# Patient Record
Sex: FEMALE | Race: BLACK OR AFRICAN AMERICAN | NOT HISPANIC OR LATINO | Employment: OTHER | URBAN - METROPOLITAN AREA
[De-identification: names, ages, dates, MRNs, and addresses within clinical notes are randomized per-mention and may not be internally consistent; named-entity substitution may affect disease eponyms.]

---

## 2017-07-07 ENCOUNTER — HISTORICAL (OUTPATIENT)
Dept: ADMINISTRATIVE | Facility: HOSPITAL | Age: 77
End: 2017-07-07

## 2018-02-28 ENCOUNTER — HISTORICAL (OUTPATIENT)
Dept: ADMINISTRATIVE | Facility: HOSPITAL | Age: 78
End: 2018-02-28

## 2019-08-08 ENCOUNTER — HISTORICAL (OUTPATIENT)
Dept: RADIOLOGY | Facility: HOSPITAL | Age: 79
End: 2019-08-08

## 2020-08-03 ENCOUNTER — HISTORICAL (OUTPATIENT)
Dept: ADMINISTRATIVE | Facility: HOSPITAL | Age: 80
End: 2020-08-03

## 2021-06-20 ENCOUNTER — HISTORICAL (OUTPATIENT)
Dept: ADMINISTRATIVE | Facility: HOSPITAL | Age: 81
End: 2021-06-20

## 2021-08-05 ENCOUNTER — HISTORICAL (OUTPATIENT)
Dept: ADMINISTRATIVE | Facility: HOSPITAL | Age: 81
End: 2021-08-05

## 2021-09-06 ENCOUNTER — HISTORICAL (OUTPATIENT)
Dept: ADMINISTRATIVE | Facility: HOSPITAL | Age: 81
End: 2021-09-06

## 2021-09-06 LAB
ABS NEUT (OLG): 6.08 X10(3)/MCL (ref 2.1–9.2)
ALBUMIN SERPL-MCNC: 3.8 GM/DL (ref 3.4–4.8)
ALBUMIN/GLOB SERPL: 1.1 RATIO (ref 1.1–2)
ALP SERPL-CCNC: 56 UNIT/L (ref 40–150)
ALT SERPL-CCNC: 14 UNIT/L (ref 0–55)
AST SERPL-CCNC: 16 UNIT/L (ref 5–34)
BASOPHILS # BLD AUTO: 0 X10(3)/MCL (ref 0–0.2)
BASOPHILS NFR BLD AUTO: 0 %
BILIRUB SERPL-MCNC: 0.4 MG/DL
BILIRUBIN DIRECT+TOT PNL SERPL-MCNC: 0.2 MG/DL (ref 0–0.5)
BILIRUBIN DIRECT+TOT PNL SERPL-MCNC: 0.2 MG/DL (ref 0–0.8)
BUN SERPL-MCNC: 16.4 MG/DL (ref 9.8–20.1)
CALCIUM SERPL-MCNC: 9.6 MG/DL (ref 8.4–10.2)
CHLORIDE SERPL-SCNC: 105 MMOL/L (ref 98–107)
CO2 SERPL-SCNC: 29 MMOL/L (ref 23–31)
CREAT SERPL-MCNC: 0.99 MG/DL (ref 0.55–1.02)
EOSINOPHIL # BLD AUTO: 0.3 X10(3)/MCL (ref 0–0.9)
EOSINOPHIL NFR BLD AUTO: 2 %
ERYTHROCYTE [DISTWIDTH] IN BLOOD BY AUTOMATED COUNT: 13.2 % (ref 11.5–14.5)
EST CREAT CLEARANCE SER (OHS): 36.13 ML/MIN
GLOBULIN SER-MCNC: 3.6 GM/DL (ref 2.4–3.5)
GLUCOSE SERPL-MCNC: 146 MG/DL (ref 82–115)
HCT VFR BLD AUTO: 40.4 % (ref 35–46)
HGB BLD-MCNC: 13.1 GM/DL (ref 12–16)
IMM GRANULOCYTES # BLD AUTO: 0.02 10*3/UL
IMM GRANULOCYTES NFR BLD AUTO: 0 %
LYMPHOCYTES # BLD AUTO: 3.2 X10(3)/MCL (ref 0.6–4.6)
LYMPHOCYTES NFR BLD AUTO: 31 %
MCH RBC QN AUTO: 29.2 PG (ref 26–34)
MCHC RBC AUTO-ENTMCNC: 32.4 GM/DL (ref 31–37)
MCV RBC AUTO: 90.2 FL (ref 80–100)
MONOCYTES # BLD AUTO: 0.7 X10(3)/MCL (ref 0.1–1.3)
MONOCYTES NFR BLD AUTO: 6 %
NEUTROPHILS # BLD AUTO: 6.08 X10(3)/MCL (ref 2.1–9.2)
NEUTROPHILS NFR BLD AUTO: 59 %
NRBC BLD AUTO-RTO: 0 % (ref 0–0.2)
PLATELET # BLD AUTO: 223 X10(3)/MCL (ref 130–400)
PMV BLD AUTO: 10.8 FL (ref 7.4–10.4)
POTASSIUM SERPL-SCNC: 3.9 MMOL/L (ref 3.5–5.1)
PROT SERPL-MCNC: 7.4 GM/DL (ref 5.8–7.6)
RBC # BLD AUTO: 4.48 X10(6)/MCL (ref 4–5.2)
SODIUM SERPL-SCNC: 141 MMOL/L (ref 136–145)
WBC # SPEC AUTO: 10.2 X10(3)/MCL (ref 4.5–11)

## 2021-11-28 ENCOUNTER — HISTORICAL (OUTPATIENT)
Dept: ADMINISTRATIVE | Facility: HOSPITAL | Age: 81
End: 2021-11-28

## 2021-11-28 LAB — SARS-COV-2 RNA RESP QL NAA+PROBE: NEGATIVE

## 2022-04-07 ENCOUNTER — HISTORICAL (OUTPATIENT)
Dept: ADMINISTRATIVE | Facility: HOSPITAL | Age: 82
End: 2022-04-07

## 2022-04-23 VITALS
HEIGHT: 62 IN | SYSTOLIC BLOOD PRESSURE: 116 MMHG | WEIGHT: 187.81 LBS | OXYGEN SATURATION: 97 % | DIASTOLIC BLOOD PRESSURE: 62 MMHG | BODY MASS INDEX: 34.56 KG/M2

## 2022-04-30 NOTE — ED PROVIDER NOTES
Patient:   Darby Gary            MRN: [id number]            FIN: [id number]               Age:   81 years     Sex:  Female     :  1940   Associated Diagnoses:   Back pain with right-sided sciatica   Author:   Zane Zaidi      Basic Information   Time seen: Immediately upon arrival.   History source: Patient.   Arrival mode: Private vehicle.   History limitation: None.   Additional information: Chief Complaint from Nursing Triage Note : Chief Complaint   2021 8:51 CDT       Chief Complaint           pt. AAOx4. pt. presents to ED with c/o R. sided hip pain that radiates down into R. leg. reports recent heavy lifting. hx. arthritis. ambulates with steady gait  .      History of Present Illness   The patient presents with back pain and lumbar pain.  The onset was 2  days ago.  The course/duration of symptoms is constant.  Type of injury: lifting.  The location where the incident occurred was at home.  Location: Right lumbar. Radiating pain: right lower extremity. The character of symptoms is dull.  The degree at onset was moderate.  The degree at present is moderate.  There are exacerbating factors including movement and changing position.  The relieving factor is rest.  Risk factors consist of none.  Prior episodes: occasional.  Therapy today: none.  Associated symptoms: none, denies bowel dysfunction, denies bladder dysfunction, denies altered sensation, denies focal weakness, denies saddle numbness, denies abdominal pain and denies dysuria.        Review of Systems   Constitutional symptoms:  Negative except as documented in HPI, no fever, no chills.    Skin symptoms:  Negative except as documented in HPI.   Eye symptoms:  Negative except as documented in HPI.   ENMT symptoms:  Negative except as documented in HPI.   Respiratory symptoms:  Negative except as documented in HPI, no shortness of breath, no cough.    Cardiovascular symptoms:  Negative except as documented in HPI, No chest  pain,    Gastrointestinal symptoms:  Negative except as documented in HPI, no abdominal pain, no nausea, no vomiting.    Genitourinary symptoms:  Negative except as documented in HPI, no dysuria, no hematuria, no discharge.    Musculoskeletal symptoms:  Negative except as documented in HPI.   Neurologic symptoms:  Negative except as documented in HPI.   Psychiatric symptoms:  Negative except as documented in HPI.   Endocrine symptoms:  Negative except as documented in HPI.             Additional review of systems information: All other systems reviewed and otherwise negative.      Health Status   Allergies:    Allergic Reactions (All)  No Known Allergies,    Allergies (1) Active Reaction  No Known Allergies None Documented  .   Medications:  (Selected)   Inpatient Medications  Ordered  ketorolac for IM: 15 mg, form: Injection, IM, Once-NOW, first dose 06/20/21 9:08:00 CDT, stop date 06/20/21 9:08:00 CDT, STAT  Prescriptions  Prescribed  fluticasone 50 mcg/inh nasal spray: 2 spray(s), Nasal, Daily, # 1 EA, 1 Refill(s)  Documented Medications  Documented  Citracal + D: BID, 0 Refill(s)  aspirin 81 mg oral tablet: 81 mg = 1 tab(s), Oral, Daily, # 30 tab(s), 0 Refill(s)  hydrochlorothiazide-lisinopril 12.5 mg-10 mg oral tablet: 1 tab, Oral, Daily, 0 Refill(s)  potassium chloride 10 mEq oral TABLET extended release: 10 mEq = 1 tab(s), Oral, Daily, # 30 tab(s), 0 Refill(s)  simvastatin 40 mg oral tablet: 40 mg = 1 tab(s), Oral, qPM.      Past Medical/ Family/ Social History   Medical history:    Resolved  Pregnant ([id number]): Onset on 8/14/1968 at 28 years.  Resolved in 1969 at 28 years.  Pregnant ([id number]): Onset on 3/27/1967 at 26 years.  Resolved in 1968 at 27 years.  Pregnant ([id number]): Onset on 3/27/1966 at 25 years.  Resolved in 1967 at 26 years.  Pregnant ([id number]): Onset on 3/27/1964 at 23 years.  Resolved in 1965 at 24 years.  Pregnant ([id number]): Onset on 3/27/1963 at 22 years.  Resolved in 1964 at 23  years.  Pregnant ([id number]): Onset on 3/27/1959 at 18 years.  Resolved in  at 19 years.  HTN - Hypertension ([id number]):  Resolved., Reviewed as documented in chart.   Surgical history:    Hysteroscopic Dilation & Curettage (None) performed by Petra Lopez MD on 2020 at 80 Years.  Comments:  2020 12:04 CDT - Jameson GARNETT, Danya Zaidi  auto-populated from documented surgical case  Colonoscopy (SNOMED CT 367653873).   section (SNOMED CT [id number])..   Family history: Not significant,    Primary malignant neoplasm of breast  Aunt  .   Social history: Reviewed as documented in chart.   Problem list:    Active Problems (5)  DM II (diabetes mellitus, type II), controlled   Family history of colon cancer   Hyperlipidemia   Hypertension   Obesity   .      Physical Examination               Vital Signs   Vital Signs   2021 8:51 CDT       Temperature Temporal Artery               36.3 DegC                             Peripheral Pulse Rate     80 bpm                             Respiratory Rate          16 br/min                             SpO2                      97 %                             Oxygen Therapy            Room air                             Systolic Blood Pressure   169 mmHg  HI                             Diastolic Blood Pressure  92 mmHg  HI  .      Vital Signs (last 24 hrs)_____  Last Charted___________  Heart Rate Peripheral   80 bpm  ( 08:51)  Resp Rate         16 br/min  ( 08:51)  SBP      H 169mmHg  ( 08:51)  DBP      H 92mmHg  ( 08:51)  SpO2      97 %  (:)  Weight      84.30 kg  ( 08:51)  .   Measurements   2021 8:51 CDT       Weight Dosing             84.30 kg                             Weight Measured           84.30 kg                             Weight Measured and Calculated in Lbs     185.85 lb  .   General:  Alert, no acute distress.    Skin:  Warm, dry.    Head:  Normocephalic.   Neck:  Supple, trachea midline, no  tenderness.    Cardiovascular:  Regular rate and rhythm, No murmur, Normal peripheral perfusion, No edema.    Respiratory:  Lungs are clear to auscultation, respirations are non-labored, breath sounds are equal, Symmetrical chest wall expansion.    Gastrointestinal:  Soft, Nontender, Non distended, Normal bowel sounds, No organomegaly.    Back:  Nontender, No costovertebral angle tenderness, , Thoracic: no vertebral point tenderness, Lumbar: Right, lateral, mild, tenderness, midline negative, no vertebral point tenderness, Sacral: no vertebral point tenderness, Testing: Straight leg raising, supine negative.    Musculoskeletal:  Normal ROM, normal strength, no tenderness, no swelling, no deformity.    Neurological:  Alert and oriented to person, place, time, and situation, No focal neurological deficit observed, normal reflexes.    Psychiatric:  Cooperative, appropriate mood & affect.       Medical Decision Making   Documents reviewed:  Emergency department nurses' notes.   Orders  Launch Order Profile (Selected)   Inpatient Orders  Ordered  ketorolac for IM: 15 mg, form: Injection, IM, Once-NOW, first dose 06/20/21 9:08:00 CDT, stop date 06/20/21 9:08:00 CDT, STAT  Ordered (Exam Ordered)  XR Spine Lumbar 2 or 3 Views: Stat, 06/20/21 9:08:00 CDT, Back Pain, None, Wheelchair, Rad Type, Not Scheduled, 06/20/21 9:08:00 CDT.   Radiology results:  06/20/2021 10:22; Zane Zaidi; Negative; No fracture, dislocation or other acute abnormality.      Impression and Plan   Diagnosis   Back pain with right-sided sciatica (RLG50-YK M54.31)   Plan   Condition: Improved, Stable.    Disposition: Medically cleared, Discharged: Time  6/20/2021 10:22:00, to home.    Prescriptions: Launch prescriptions   Pharmacy:  meloxicam 7.5 mg oral tablet (Prescribe): 7.5 mg = 1 tab(s), Oral, Daily, PRN PRN pain, # 12 tab(s), 0 Refill(s), Pharmacy: [id number], 157.5, cm, Height/Length Dosing, 5/12/2021 21:23 CDT, 84.3, kg, Weight  Dosing, 6/20/2021 8:51 CDT.    Patient was given the following educational materials: Sciatica, Easy-to-Read.    Follow up with: Report to ED if symptoms return / worsen Follow-up with PCP in 3 to 5 days.    Counseled: Patient, Regarding diagnosis, Regarding diagnostic results, Regarding treatment plan, Regarding prescription, Patient indicated understanding of instructions.    Orders: Launch Orders   Admit/Transfer/Discharge:  Discharge (Order): 6/20/2021 10:23 CDT, Home.

## 2022-04-30 NOTE — ED PROVIDER NOTES
Patient:   Darby Gary            MRN: [id number]            FIN: [id number]               Age:   81 years     Sex:  Female     :  1940   Associated Diagnoses:   Acute constipation; History of lower GI bleeding   Author:   Marcel Stone MD      Basic Information   Time seen: Immediately upon arrival.   History source: Patient.   Arrival mode: Private vehicle.   History limitation: None.   Additional information: Chief Complaint from Nursing Triage Note : Chief Complaint   2021 16:59 CDT       Chief Complaint           pt arrived to ED c/o blood in stool starting today. denies N/V, diarrhea.  hx polyps  .   Provider/Visit info:   Time Seen:  Marcel Stone MD / 2021 17:05  .   History of Present Illness   The patient presents with rectal bleeding.  The onset was just prior to arrival.  The course/duration of symptoms is episodic: with a single episode.  Vomiting: none.  Rectal bleed: blood with stool and degree minimal.  The exacerbating factor is none.  The relieving factor is none.  Risk factors consist of diabetes mellitus.  Prior episodes: none.  Therapy today: none.  Associated symptoms: denies abdominal pain, denies vomiting, denies fever, denies diarrhea, denies rectal pain and denies dizziness.        Review of Systems   Constitutional symptoms:  No fever, no weakness, no fatigue.    Skin symptoms:  No rash, no petechiae.    Eye symptoms:  No recent vision problems,    Respiratory symptoms:  No shortness of breath, no orthopnea, no cough, no hemoptysis.    Cardiovascular symptoms:  No chest pain, no palpitations, no syncope, no diaphoresis, no peripheral edema.    Gastrointestinal symptoms:  Constipation, Took magnesium citrate, no abdominal pain, no nausea, no vomiting, no diarrhea.    Genitourinary symptoms:  No dysuria, no hematuria, no vaginal bleeding, no vaginal discharge.    Musculoskeletal symptoms:  No back pain, no Joint pain.    Neurologic  symptoms:  No headache, no dizziness, no altered level of consciousness, no numbness, no tingling, no weakness.    Hematologic/Lymphatic symptoms:  Negative except as documented in HPI.             Additional review of systems information: All other systems reviewed and otherwise negative.      Health Status   Allergies:    Allergic Reactions (Selected)  No Known Allergies,    Allergies (1) Active Reaction  No Known Allergies None Documented  .   Medications:  (Selected)   Prescriptions  Prescribed  meloxicam 7.5 mg oral tablet: 7.5 mg = 1 tab(s), Oral, Daily, PRN PRN pain, # 12 tab(s), 0 Refill(s), Pharmacy: [id number], 157.5, cm, Height/Length Dosing, 05/12/21 21:23:00 CDT, 84.3, kg, Weight Dosing, 06/20/21 8:51:00 CDT  prednisONE 10 mg oral tablet: 10 mg = 1 tab(s), Oral, Daily, start tomorrow 6/28, first dose given in ER, # 3 tab(s), 0 Refill(s), Pharmacy: [place] [facility] [address] [facility], 164, cm, Height/Length Dosing, 06/27/21 8:41:00 CDT, 86.6, kg, Weight Dosing, 06/27/21 8:41:00 CDT  Documented Medications  Documented  Citracal + D: BID, 0 Refill(s)  Norco 5 mg-325 mg oral tablet: 1 tab(s), Oral, q4hr, PRN PRN for pain, 0 Refill(s)  aspirin 81 mg oral tablet: 81 mg = 1 tab(s), Oral, Daily, # 30 tab(s), 0 Refill(s)  hydrochlorothiazide-lisinopril 12.5 mg-10 mg oral tablet: 1 tab, Oral, Daily, 0 Refill(s)  metformin 500 mg oral tablet: 500 mg = 1 tab(s), Oral, BID, # 180 tab(s), 0 Refill(s)  potassium chloride 10 mEq oral TABLET extended release: 10 mEq = 1 tab(s), Oral, Daily, # 30 tab(s), 0 Refill(s)  simvastatin 40 mg oral tablet: 40 mg = 1 tab(s), Oral, qPM  traMADol 50 mg oral tablet: 50 mg = 1 tab(s), Oral, BID.      Past Medical/ Family/ Social History   Medical history:    Resolved  Pregnant (191073013): Onset on 8/14/1968 at 28 years.  Resolved in 1969 at 28 years.  Pregnant ([id number]): Onset on 3/27/1967 at 26 years.  Resolved in 1968 at 27 years.  Pregnant ([id number]): Onset on 3/27/1966 at 25  years.  Resolved in  at 26 years.  Pregnant ([id number]): Onset on 3/27/1964 at 23 years.  Resolved in  at 24 years.  Pregnant ([id number]): Onset on 3/27/1963 at 22 years.  Resolved in  at 23 years.  Pregnant ([id number]): Onset on 3/27/1959 at 18 years.  Resolved in  at 19 years.  HTN - Hypertension ([id number]):  Resolved..   Surgical history:    Hysteroscopic Dilation & Curettage (None) on 2020 at 80 Years.  Comments:  2020 12:04 SENAIT Barba RN, Danya Zaidi  auto-populated from documented surgical case  Colonoscopy ([id number]).   section ([id number])..   Family history:    Primary malignant neoplasm of breast  Aunt  .   Social history:    Social & Psychosocial Habits    Alcohol  12/15/2012 Risk Assessment: Denies Alcohol Use    Sexual  2020  Sexually active: No    Substance Use  12/15/2012 Risk Assessment: Denies Substance Abuse    2020  Use: Never    Tobacco  12/15/2012 Risk Assessment: Denies Tobacco Use    2021  Use: Never (less than 100 in l    Patient Wants Consult For Cessation Counseling No    2021  Use: Never (less than 100 in l    Patient Wants Consult For Cessation Counseling N/A    2021  Use: Never (less than 100 in l    Patient Wants Consult For Cessation Counseling No    2021  Use: Never (less than 100 in l    Patient Wants Consult For Cessation Counseling No    Abuse/Neglect  2021  SHX Any signs of abuse or neglect No    2021  SHX Any signs of abuse or neglect No    2021  SHX Any signs of abuse or neglect No    Feels unsafe at home: No    Safe place to go: Yes    2021  SHX Any signs of abuse or neglect No    Feels unsafe at home: No    Safe place to go: Yes  , Alcohol use: Denies, Tobacco use: Denies, Drug use: Denies.   Problem list:    Active Problems (6)  Alteration in comfort   DM II (diabetes mellitus, type II), controlled   Family history of colon cancer   Hyperlipidemia   Hypertension   Obesity   .       Physical Examination               Vital Signs   Vital Signs   9/6/2021 16:59 CDT       Temperature Oral          36.9 DegC                             Temperature Oral (calculated)             98.42 DegF                             Peripheral Pulse Rate     79 bpm                             Respiratory Rate          17 br/min                             SpO2                      98 %                             Oxygen Therapy            Room air                             Systolic Blood Pressure   159 mmHg  HI                             Diastolic Blood Pressure  88 mmHg  .      Vital Signs (last 24 hrs)_____  Last Charted___________  Temp Oral     36.9 DegC  (SEP 06 16:59)  Heart Rate Peripheral   79 bpm  (SEP 06 16:59)  Resp Rate         17 br/min  (SEP 06 16:59)  SBP      H 159mmHg  (SEP 06 16:59)  DBP      88 mmHg  (SEP 06 16:59)  SpO2      98 %  (SEP 06 16:59)  Weight      89 kg  (SEP 06 16:59)  .   Measurements   9/6/2021 16:59 CDT       Weight Dosing             89 kg                             Weight Measured           89 kg                             Weight Measured and Calculated in Lbs     196.21 lb                             Height/Length Dosing      157 cm                             Height/Length Estimated   157 cm  .   Basic Oxygen Information   9/6/2021 16:59 CDT       SpO2                      98 %                             Oxygen Therapy            Room air  .   General:  Alert, no acute distress.    Skin:  Warm, dry, pink, intact, no pallor.    Head:  Normocephalic.   Neck:  Supple, no JVD.    Eye:  Pupils are equal, round and reactive to light, normal conjunctiva.    Ears, nose, mouth and throat:  Oral mucosa moist.   Cardiovascular:  Regular rate and rhythm, No murmur, Normal peripheral perfusion, No edema.    Respiratory:  Lungs are clear to auscultation, respirations are non-labored, breath sounds are equal, Symmetrical chest wall expansion.    Gastrointestinal:  Soft, Nontender, Non  distended, Normal bowel sounds, No organomegaly.    Back:  Nontender, Normal range of motion.    Musculoskeletal:  Normal ROM, normal strength, no swelling.    Neurological:  Alert and oriented to person, place, time, and situation, No focal neurological deficit observed, normal motor observed, normal speech observed, normal coordination observed.    Psychiatric:  Cooperative, appropriate mood & affect.       Medical Decision Making   Documents reviewed:  Emergency department nurses' notes, emergency department records, prior records.    Results review:  Reviewed Results: Lab results : Lab View(Date Range: 9/5/2021 0:00 CDT - 9/6/2021 19:22 CDT),    No qualifying data available, Interpretation Labs unremarkable, Normal results.    Abdominal/KUB X-ray  Nonspecific bowel gas pattern, interpretation by Emergency Physician, Moderate constipation.       Impression and Plan   Diagnosis   Acute constipation (QCS68-DP K59.00)   History of lower GI bleeding (WZX55-IV Z87.19)   Acute constipation (AKZ82-TW K59.00)   Plan   Condition: Stable.    Disposition: Discharged: Time  9/6/2021 19:23:00, to home.    Patient was given the following educational materials: Constipation, Adult, Easy-to-Read, Colorectal Cancer Screening, Preventing Colorectal Cancer, Rectal Bleeding, Rectal Bleeding, Preventing Colorectal Cancer, Colorectal Cancer Screening, Constipation, Adult, Easy-to-Read.    Follow up with: Report to ED if symptoms return / worsen; [facility] - GI Lab Within 1 month; Follow-up with PCP in 3 to 5 days.    Counseled: Patient, Regarding diagnosis, Regarding diagnostic results, Regarding treatment plan, Regarding prescription, Patient indicated understanding of instructions.

## 2022-06-01 DIAGNOSIS — Z01.419 GYNECOLOGIC EXAM NORMAL: Primary | ICD-10-CM

## 2022-10-26 ENCOUNTER — OFFICE VISIT (OUTPATIENT)
Dept: URGENT CARE | Facility: CLINIC | Age: 82
End: 2022-10-26
Payer: COMMERCIAL

## 2022-10-26 VITALS
HEIGHT: 62 IN | HEART RATE: 74 BPM | WEIGHT: 194.19 LBS | DIASTOLIC BLOOD PRESSURE: 79 MMHG | BODY MASS INDEX: 35.73 KG/M2 | OXYGEN SATURATION: 97 % | RESPIRATION RATE: 18 BRPM | SYSTOLIC BLOOD PRESSURE: 143 MMHG | TEMPERATURE: 98 F

## 2022-10-26 DIAGNOSIS — M54.41 RIGHT-SIDED LOW BACK PAIN WITH RIGHT-SIDED SCIATICA, UNSPECIFIED CHRONICITY: Primary | ICD-10-CM

## 2022-10-26 PROCEDURE — 99213 OFFICE O/P EST LOW 20 MIN: CPT | Mod: S$PBB,,,

## 2022-10-26 PROCEDURE — 99213 PR OFFICE/OUTPT VISIT, EST, LEVL III, 20-29 MIN: ICD-10-PCS | Mod: S$PBB,,,

## 2022-10-26 PROCEDURE — 99214 OFFICE O/P EST MOD 30 MIN: CPT | Mod: PBBFAC

## 2022-10-26 RX ORDER — SIMVASTATIN 40 MG/1
TABLET, FILM COATED ORAL
COMMUNITY
Start: 2022-08-15

## 2022-10-26 RX ORDER — TRAMADOL HYDROCHLORIDE 50 MG/1
50 TABLET ORAL 2 TIMES DAILY PRN
COMMUNITY
Start: 2022-09-21 | End: 2023-02-02

## 2022-10-26 RX ORDER — POTASSIUM CHLORIDE 750 MG/1
10 TABLET, EXTENDED RELEASE ORAL DAILY
COMMUNITY
Start: 2022-08-23 | End: 2024-01-16 | Stop reason: SDUPTHER

## 2022-10-26 RX ORDER — HYDROCODONE BITARTRATE AND ACETAMINOPHEN 5; 325 MG/1; MG/1
1 TABLET ORAL EVERY 6 HOURS PRN
Qty: 12 TABLET | Refills: 0 | Status: SHIPPED | OUTPATIENT
Start: 2022-10-26 | End: 2022-10-29

## 2022-10-26 RX ORDER — METFORMIN HYDROCHLORIDE 500 MG/1
500 TABLET ORAL DAILY
COMMUNITY
Start: 2022-09-21

## 2022-10-26 RX ORDER — LISINOPRIL AND HYDROCHLOROTHIAZIDE 10; 12.5 MG/1; MG/1
TABLET ORAL
COMMUNITY
Start: 2022-08-15

## 2022-10-26 NOTE — PROGRESS NOTES
"Subjective:       Patient ID: Darby Gary is a 82 y.o. female.    Vitals:  height is 5' 2.21" (1.58 m) and weight is 88.1 kg (194 lb 3.2 oz). Her oral temperature is 97.9 °F (36.6 °C). Her blood pressure is 143/79 (abnormal) and her pulse is 74. Her respiration is 18 and oxygen saturation is 97%.     Chief Complaint: Leg Pain (Right leg pain. X3 days. States may be Sciatica. Denies numbness. )    Pt states R sided pain that radiates from lower back down to knee for the past 2 days. States has had similar issues with the left side in the past. Denies any fall, injury, bowel or bladder changes.    Leg Pain       Constitution: Negative.   HENT: Negative.     Neck: neck negative.   Cardiovascular: Negative.    Eyes: Negative.    Respiratory: Negative.     Gastrointestinal: Negative.    Genitourinary: Negative.    Musculoskeletal:  Positive for pain.   Allergic/Immunologic: Negative.    Neurological: Negative.    Hematologic/Lymphatic: Negative.      Objective:      Physical Exam   Constitutional: She is oriented to person, place, and time.   HENT:   Head: Normocephalic.   Nose: Nose normal.   Mouth/Throat: Mucous membranes are moist. Oropharynx is clear.   Eyes: Pupils are equal, round, and reactive to light.   Cardiovascular: Normal rate and normal pulses.   Pulmonary/Chest: Effort normal.   Abdominal: Normal appearance. Soft.   Musculoskeletal: Normal range of motion.         General: Tenderness present. No signs of injury. Normal range of motion.        Arms:    Neurological: She is alert and oriented to person, place, and time.   Skin: Skin is warm and dry.       Assessment:       1. Right-sided low back pain with right-sided sciatica, unspecified chronicity          Plan:         Right-sided low back pain with right-sided sciatica, unspecified chronicity  -     HYDROcodone-acetaminophen (NORCO) 5-325 mg per tablet; Take 1 tablet by mouth every 6 (six) hours as needed for Pain.  Dispense: 12 tablet; Refill: 0     "   Pt states taking tramadol in the past without relief.  Pt given hydrocodone. Instructed patient of possible side effects.  Pt verbalized understanding.    Follow up with PCP as soon as possible for pain.

## 2022-12-02 ENCOUNTER — HOSPITAL ENCOUNTER (EMERGENCY)
Facility: HOSPITAL | Age: 82
Discharge: HOME | End: 2022-12-02
Attending: INTERNAL MEDICINE
Payer: COMMERCIAL

## 2022-12-02 VITALS
RESPIRATION RATE: 15 BRPM | BODY MASS INDEX: 34.96 KG/M2 | HEART RATE: 102 BPM | DIASTOLIC BLOOD PRESSURE: 81 MMHG | HEIGHT: 62 IN | OXYGEN SATURATION: 96 % | SYSTOLIC BLOOD PRESSURE: 137 MMHG | TEMPERATURE: 100 F | WEIGHT: 190 LBS

## 2022-12-02 DIAGNOSIS — N30.00 ACUTE CYSTITIS WITHOUT HEMATURIA: ICD-10-CM

## 2022-12-02 DIAGNOSIS — U07.1 COVID-19 VIRUS INFECTION: Primary | ICD-10-CM

## 2022-12-02 DIAGNOSIS — R00.0 TACHYCARDIA: ICD-10-CM

## 2022-12-02 LAB
ALBUMIN SERPL-MCNC: 4.1 GM/DL (ref 3.4–4.8)
ALBUMIN/GLOB SERPL: 1.1 RATIO (ref 1.1–2)
ALP SERPL-CCNC: 63 UNIT/L (ref 40–150)
ALT SERPL-CCNC: 12 UNIT/L (ref 0–55)
APPEARANCE UR: CLEAR
AST SERPL-CCNC: 16 UNIT/L (ref 5–34)
BACTERIA #/AREA URNS AUTO: ABNORMAL /HPF
BASOPHILS # BLD AUTO: 0.03 X10(3)/MCL (ref 0–0.2)
BASOPHILS NFR BLD AUTO: 0.3 %
BILIRUB UR QL STRIP.AUTO: NEGATIVE MG/DL
BILIRUBIN DIRECT+TOT PNL SERPL-MCNC: 0.4 MG/DL
BUN SERPL-MCNC: 17.2 MG/DL (ref 9.8–20.1)
CALCIUM SERPL-MCNC: 9.4 MG/DL (ref 8.4–10.2)
CASTS URNS MICRO: ABNORMAL /LPF
CHLORIDE SERPL-SCNC: 101 MMOL/L (ref 98–107)
CO2 SERPL-SCNC: 26 MMOL/L (ref 23–31)
COLOR UR AUTO: ABNORMAL
CREAT SERPL-MCNC: 0.96 MG/DL (ref 0.55–1.02)
EOSINOPHIL # BLD AUTO: 0.01 X10(3)/MCL (ref 0–0.9)
EOSINOPHIL NFR BLD AUTO: 0.1 %
ERYTHROCYTE [DISTWIDTH] IN BLOOD BY AUTOMATED COUNT: 12.8 % (ref 11.5–17)
FLUAV AG UPPER RESP QL IA.RAPID: NOT DETECTED
FLUBV AG UPPER RESP QL IA.RAPID: NOT DETECTED
GFR SERPLBLD CREATININE-BSD FMLA CKD-EPI: 60 MLS/MIN/1.73/M2
GLOBULIN SER-MCNC: 3.7 GM/DL (ref 2.4–3.5)
GLUCOSE SERPL-MCNC: 181 MG/DL (ref 82–115)
GLUCOSE UR QL STRIP.AUTO: NORMAL MG/DL
HCT VFR BLD AUTO: 42.5 % (ref 37–47)
HGB BLD-MCNC: 13.8 GM/DL (ref 12–16)
HYALINE CASTS #/AREA URNS LPF: ABNORMAL /LPF
IMM GRANULOCYTES # BLD AUTO: 0.05 X10(3)/MCL (ref 0–0.04)
IMM GRANULOCYTES NFR BLD AUTO: 0.4 %
KETONES UR QL STRIP.AUTO: NEGATIVE MG/DL
LEUKOCYTE ESTERASE UR QL STRIP.AUTO: 75 UNIT/L
LYMPHOCYTES # BLD AUTO: 1.01 X10(3)/MCL (ref 0.6–4.6)
LYMPHOCYTES NFR BLD AUTO: 8.4 %
MCH RBC QN AUTO: 28.8 PG (ref 27–31)
MCHC RBC AUTO-ENTMCNC: 32.5 MG/DL (ref 33–36)
MCV RBC AUTO: 88.7 FL (ref 80–94)
MONOCYTES # BLD AUTO: 0.54 X10(3)/MCL (ref 0.1–1.3)
MONOCYTES NFR BLD AUTO: 4.5 %
MUCOUS THREADS URNS QL MICRO: ABNORMAL /LPF
NEUTROPHILS # BLD AUTO: 10.3 X10(3)/MCL (ref 2.1–9.2)
NEUTROPHILS NFR BLD AUTO: 86.3 %
NITRITE UR QL STRIP.AUTO: NEGATIVE
NRBC BLD AUTO-RTO: 0 %
PH UR STRIP.AUTO: 8 [PH]
PLATELET # BLD AUTO: 229 X10(3)/MCL (ref 130–400)
PMV BLD AUTO: 9.6 FL (ref 7.4–10.4)
POTASSIUM SERPL-SCNC: 3.8 MMOL/L (ref 3.5–5.1)
PROT SERPL-MCNC: 7.8 GM/DL (ref 5.8–7.6)
PROT UR QL STRIP.AUTO: ABNORMAL MG/DL
RBC # BLD AUTO: 4.79 X10(6)/MCL (ref 4.2–5.4)
RBC #/AREA URNS AUTO: ABNORMAL /HPF
RBC UR QL AUTO: ABNORMAL UNIT/L
SARS-COV-2 RNA RESP QL NAA+PROBE: DETECTED
SODIUM SERPL-SCNC: 139 MMOL/L (ref 136–145)
SP GR UR STRIP.AUTO: 1.02
SQUAMOUS #/AREA URNS LPF: ABNORMAL /HPF
UROBILINOGEN UR STRIP-ACNC: NORMAL MG/DL
WBC # SPEC AUTO: 12 X10(3)/MCL (ref 4.5–11.5)
WBC #/AREA URNS AUTO: ABNORMAL /HPF

## 2022-12-02 PROCEDURE — 81001 URINALYSIS AUTO W/SCOPE: CPT | Performed by: INTERNAL MEDICINE

## 2022-12-02 PROCEDURE — 63600175 PHARM REV CODE 636 W HCPCS

## 2022-12-02 PROCEDURE — 80053 COMPREHEN METABOLIC PANEL: CPT | Performed by: INTERNAL MEDICINE

## 2022-12-02 PROCEDURE — 25000003 PHARM REV CODE 250: Performed by: INTERNAL MEDICINE

## 2022-12-02 PROCEDURE — 93005 ELECTROCARDIOGRAM TRACING: CPT

## 2022-12-02 PROCEDURE — 82962 GLUCOSE BLOOD TEST: CPT

## 2022-12-02 PROCEDURE — 99285 EMERGENCY DEPT VISIT HI MDM: CPT | Mod: 25

## 2022-12-02 PROCEDURE — 0240U COVID/FLU A&B PCR: CPT | Performed by: INTERNAL MEDICINE

## 2022-12-02 PROCEDURE — 85025 COMPLETE CBC W/AUTO DIFF WBC: CPT | Performed by: INTERNAL MEDICINE

## 2022-12-02 PROCEDURE — 96374 THER/PROPH/DIAG INJ IV PUSH: CPT

## 2022-12-02 RX ORDER — ONDANSETRON 2 MG/ML
INJECTION INTRAMUSCULAR; INTRAVENOUS
Status: COMPLETED
Start: 2022-12-02 | End: 2022-12-02

## 2022-12-02 RX ORDER — ONDANSETRON 2 MG/ML
4 INJECTION INTRAMUSCULAR; INTRAVENOUS
Status: COMPLETED | OUTPATIENT
Start: 2022-12-02 | End: 2022-12-02

## 2022-12-02 RX ORDER — ACETAMINOPHEN 500 MG
1000 TABLET ORAL
Status: COMPLETED | OUTPATIENT
Start: 2022-12-02 | End: 2022-12-02

## 2022-12-02 RX ORDER — AMOXICILLIN AND CLAVULANATE POTASSIUM 500; 125 MG/1; MG/1
1 TABLET, FILM COATED ORAL 2 TIMES DAILY
Qty: 10 TABLET | Refills: 0 | Status: SHIPPED | OUTPATIENT
Start: 2022-12-02 | End: 2022-12-07

## 2022-12-02 RX ADMIN — ONDANSETRON 4 MG: 2 INJECTION INTRAMUSCULAR; INTRAVENOUS at 08:12

## 2022-12-02 RX ADMIN — ACETAMINOPHEN 1000 MG: 500 TABLET ORAL at 08:12

## 2022-12-02 NOTE — Clinical Note
"Darby"Yarely Gary was seen and treated in our emergency department on 12/2/2022.  She may return to work on 12/10/2022.       If you have any questions or concerns, please don't hesitate to call.      Marcel Mccormack MD"

## 2022-12-03 LAB — POCT GLUCOSE: 172 MG/DL (ref 70–110)

## 2022-12-03 NOTE — ED PROVIDER NOTES
Encounter Date: 12/2/2022       History     Chief Complaint   Patient presents with    Altered Mental Status    Emesis     Pt and her daughter states that pt had multiple episodes of vomiting today while at work. Pt then became disorineted afterwards and was unaware of where she was or where she had left her vehicle or how she got home. Pt denies N/V at present. GCS 15 AAOx3 unsure what day of the week it is     Presents with her daughter after an episode of disorientation and vomiting. Pt states she went to work, was not feeling well and vomited for which a co worker brought her home. Family was concern when they arrived home because her car was not there and the door was open. When they woke her up she did not recall the event, just remember been sick at work and asking a coworker to brought her home. At this time pt states just feeling weak, no other complaints    The history is provided by the patient and a relative.   Review of patient's allergies indicates:  No Known Allergies  Past Medical History:   Diagnosis Date    Arthritis     Hyperlipidemia     Hypertension     Osteoporosis      No past surgical history on file.  No family history on file.  Social History     Tobacco Use    Smoking status: Never    Smokeless tobacco: Never   Substance Use Topics    Alcohol use: Never    Drug use: Never     Review of Systems   Constitutional:  Negative for fever.   HENT:  Negative for sore throat.    Respiratory:  Negative for shortness of breath.    Cardiovascular:  Negative for chest pain.   Gastrointestinal:  Positive for nausea and vomiting.   Genitourinary:  Negative for dysuria.   Musculoskeletal:  Negative for back pain.   Skin:  Negative for rash.   Neurological:  Positive for weakness and light-headedness.   Hematological:  Does not bruise/bleed easily.   All other systems reviewed and are negative.    Physical Exam     Initial Vitals [12/02/22 1938]   BP Pulse Resp Temp SpO2   (!) 165/80 (!) 115 20 100 °F (37.8  °C) 97 %      MAP       --         Physical Exam    Nursing note and vitals reviewed.  Constitutional: She appears well-developed.   HENT:   Head: Normocephalic and atraumatic.   Mouth/Throat: Oropharynx is clear and moist.   Eyes: Conjunctivae and EOM are normal. Pupils are equal, round, and reactive to light.   Neck: Neck supple.   Normal range of motion.  Cardiovascular:  Normal rate, regular rhythm, normal heart sounds and intact distal pulses.           Pulmonary/Chest: Breath sounds normal.   Abdominal: Abdomen is soft. Bowel sounds are normal. She exhibits no distension. There is no abdominal tenderness. There is no rebound and no guarding.   Musculoskeletal:         General: No edema. Normal range of motion.      Cervical back: Normal range of motion and neck supple.     Neurological: She is alert and oriented to person, place, and time. She has normal strength. GCS score is 15. GCS eye subscore is 4. GCS verbal subscore is 5. GCS motor subscore is 6.   Skin: Skin is warm and dry. No rash noted.   Psychiatric: Her behavior is normal.       ED Course   Procedures  Labs Reviewed   COMPREHENSIVE METABOLIC PANEL - Abnormal; Notable for the following components:       Result Value    Glucose Level 181 (*)     Protein Total 7.8 (*)     Globulin 3.7 (*)     All other components within normal limits   URINALYSIS, REFLEX TO URINE CULTURE - Abnormal; Notable for the following components:    Protein, UA Trace (*)     Blood, UA 1+ (*)     Leukocyte Esterase, UA 75 (*)     WBC, UA 6-10 (*)     Bacteria, UA Trace (*)     Squamous Epithelial Cells, UA Many (*)     Mucous, UA Trace (*)     Unclassified Cast, UA 0-2 (*)     RBC, UA 11-20 (*)     All other components within normal limits   CBC WITH DIFFERENTIAL - Abnormal; Notable for the following components:    WBC 12.0 (*)     MCHC 32.5 (*)     Neut # 10.3 (*)     IG# 0.05 (*)     All other components within normal limits   COVID/FLU A&B PCR - Abnormal; Notable for the  following components:    SARS-CoV-2 PCR Detected (*)     All other components within normal limits    Narrative:     The Xpert Xpress SARS-CoV-2/FLU/RSV plus is a rapid, multiplexed real-time PCR test intended for the simultaneous qualitative detection and differentiation of SARS-CoV-2, Influenza A, Influenza B, and respiratory syncytial virus (RSV) viral RNA in either nasopharyngeal swab or nasal swab specimens.         CBC W/ AUTO DIFFERENTIAL    Narrative:     The following orders were created for panel order CBC auto differential.  Procedure                               Abnormality         Status                     ---------                               -----------         ------                     CBC with Differential[[id number]]        Abnormal            Final result                 Please view results for these tests on the individual orders.   POCT GLUCOSE MONITORING CONTINUOUS     EKG Readings: (Independently Interpreted)   Initial Reading: No STEMI. Rhythm: Sinus Tachycardia. Heart Rate: 116. Ectopy: No Ectopy. Conduction: Normal. ST Segments: Normal ST Segments. T Waves: Normal. Axis: Normal. Clinical Impression: Sinus Tachycardia     Imaging Results              X-Ray Chest 1 View (Final result)  Result time 12/02/22 20:32:52      Final result by Ethan Edgar MD (12/02/22 20:32:52)                   Impression:      No acute disease is seen      Electronically signed by: Ethan Edgar MD  Date:    12/02/2022  Time:    20:32               Narrative:    EXAMINATION:  XR CHEST 1 VIEW    CLINICAL HISTORY:  AMS, cough;    TECHNIQUE:  Single frontal view of the chest was performed.    COMPARISON:  05/12/2021    FINDINGS:  No infiltrates are seen.  Heart size is within normal limits.  The costophrenic angles are clear.                                       Medications   acetaminophen tablet 1,000 mg (1,000 mg Oral Given 12/2/22 2016)   ondansetron injection 4 mg (4 mg Intravenous Given 12/2/22 2023)                               Clinical Impression:   Final diagnoses:  [R00.0] Tachycardia  [U07.1] COVID-19 virus infection (Primary)  [N30.00] Acute cystitis without hematuria        ED Disposition Condition    Discharge Stable          ED Prescriptions       Medication Sig Dispense Start Date End Date Auth. Provider    nirmatrelvir-ritonavir 150-100 mg DsPk Take 2 tablets by mouth 2 (two) times a day. Take 2 tablets by mouth 2 (two) times daily for 5 days. Each dose contains 1 nirmatrelvir (pink tablet) and 1 ritonavir (white tablet). Take both tablets together for 5 days 20 tablet 12/2/2022 12/7/2022 Marcel Mccormack MD    amoxicillin-clavulanate 500-125mg (AUGMENTIN) 500-125 mg Tab Take 1 tablet (500 mg total) by mouth 2 (two) times daily. for 5 days 10 tablet 12/2/2022 12/7/2022 Marcel Mccormack MD          Follow-up Information       Follow up With Specialties Details Why Contact Info    [facility] Emergency Medicine  If symptoms worsen [address] [place] [id number] [phone]    [facility]  Schedule an appointment as soon as possible for a visit in 1 month  [address] [place] [phone]             Marcel Mccormack MD  12/02/22 [id number]

## 2022-12-27 ENCOUNTER — HOSPITAL ENCOUNTER (OUTPATIENT)
Dept: RADIOLOGY | Facility: HOSPITAL | Age: 82
Discharge: HOME | End: 2022-12-27
Attending: NURSE PRACTITIONER
Payer: COMMERCIAL

## 2022-12-27 ENCOUNTER — OFFICE VISIT (OUTPATIENT)
Dept: URGENT CARE | Facility: CLINIC | Age: 82
End: 2022-12-27
Payer: COMMERCIAL

## 2022-12-27 VITALS
HEART RATE: 75 BPM | RESPIRATION RATE: 18 BRPM | HEIGHT: 62 IN | SYSTOLIC BLOOD PRESSURE: 147 MMHG | BODY MASS INDEX: 34.41 KG/M2 | OXYGEN SATURATION: 98 % | TEMPERATURE: 98 F | WEIGHT: 187 LBS | DIASTOLIC BLOOD PRESSURE: 81 MMHG

## 2022-12-27 DIAGNOSIS — J34.89 NASAL VESTIBULITIS: ICD-10-CM

## 2022-12-27 DIAGNOSIS — J40 BRONCHITIS: Primary | ICD-10-CM

## 2022-12-27 PROBLEM — E11.9 TYPE 2 DIABETES MELLITUS: Status: ACTIVE | Noted: 2022-12-27

## 2022-12-27 PROBLEM — I10 HYPERTENSION: Status: ACTIVE | Noted: 2022-12-27

## 2022-12-27 PROBLEM — E66.9 OBESITY: Status: ACTIVE | Noted: 2022-12-27

## 2022-12-27 PROBLEM — Z80.0 FAMILY HISTORY OF MALIGNANT NEOPLASM OF COLON: Status: ACTIVE | Noted: 2022-12-27

## 2022-12-27 PROBLEM — E78.5 HYPERLIPIDEMIA: Status: ACTIVE | Noted: 2022-12-27

## 2022-12-27 PROCEDURE — 99213 OFFICE O/P EST LOW 20 MIN: CPT | Mod: S$PBB,,, | Performed by: NURSE PRACTITIONER

## 2022-12-27 PROCEDURE — 99215 OFFICE O/P EST HI 40 MIN: CPT | Mod: PBBFAC,25 | Performed by: NURSE PRACTITIONER

## 2022-12-27 PROCEDURE — 99213 PR OFFICE/OUTPT VISIT, EST, LEVL III, 20-29 MIN: ICD-10-PCS | Mod: S$PBB,,, | Performed by: NURSE PRACTITIONER

## 2022-12-27 PROCEDURE — 71046 X-RAY EXAM CHEST 2 VIEWS: CPT | Mod: TC

## 2022-12-27 RX ORDER — BENZONATATE 200 MG/1
200 CAPSULE ORAL 3 TIMES DAILY PRN
Qty: 30 CAPSULE | Refills: 0 | Status: SHIPPED | OUTPATIENT
Start: 2022-12-27 | End: 2023-01-06

## 2022-12-27 RX ORDER — SULFAMETHOXAZOLE AND TRIMETHOPRIM 800; 160 MG/1; MG/1
1 TABLET ORAL 2 TIMES DAILY
Qty: 20 TABLET | Refills: 0 | Status: SHIPPED | OUTPATIENT
Start: 2022-12-27 | End: 2023-01-06

## 2022-12-27 RX ORDER — MUPIROCIN 20 MG/G
OINTMENT TOPICAL 3 TIMES DAILY
Qty: 22 G | Refills: 0 | Status: SHIPPED | OUTPATIENT
Start: 2022-12-27 | End: 2023-01-01

## 2022-12-27 RX ORDER — GABAPENTIN 300 MG/1
300-600 CAPSULE ORAL NIGHTLY
COMMUNITY
Start: 2022-11-03

## 2022-12-27 NOTE — PROGRESS NOTES
"Subjective:       Patient ID: Darby Gary is a 82 y.o. female.    Vitals:  height is 5' 2" (1.575 m) and weight is 84.8 kg (187 lb). Her oral temperature is 98.4 °F (36.9 °C). Her blood pressure is 147/81 (abnormal) and her pulse is 75. Her respiration is 18 and oxygen saturation is 98%.     Chief Complaint: Cough (X1 1/2 weeks. States had COVID about one month ago.), Nasal Congestion (X1 1/2 weeks. States had COVID about one month ago.), Headache (X1 1/2 weeks. States had COVID about one month ago.), Sore Throat (X1 1/2 weeks. States had COVID about one month ago.), and Generalized Body Aches (X1 1/2 weeks. States had COVID about one month ago.)    HPI as stated in CC. Covid 1 month ago, recovered and tested negative. Onset currently symptoms approx 10 days. Chest congestion, a lot of coughing, pain in tip of nose with redness. States blowing nose a lot, "piles of tissues". Body aches. Runny nose. Manages her DMII with Diet/Exercise. No sugar/carbs. Takes Metformin PRN. CBG at home this am 100.  ROS    Objective:      Physical Exam   Constitutional: She is oriented to person, place, and time. She appears ill. normal  HENT:   Nose: Rhinorrhea and congestion present. No purulent discharge. Right sinus exhibits no maxillary sinus tenderness and no frontal sinus tenderness. Left sinus exhibits no frontal sinus tenderness.   Eyes: Conjunctivae are normal.   Cardiovascular: Normal heart sounds.   Pulmonary/Chest: Effort normal and breath sounds normal.   Abdominal: Normal appearance.   Musculoskeletal: Normal range of motion.         General: Normal range of motion.      Cervical back: She exhibits no tenderness.   Lymphadenopathy:     She has no cervical adenopathy.   Neurological: She is alert and oriented to person, place, and time.   Skin: Skin is warm and dry.   Psychiatric: Her behavior is normal. Mood, judgment and thought content normal.   Vitals reviewed.      Assessment:       1. Bronchitis    2. Nasal " vestibulitis        X-Ray Chest 1 View    Result Date: 12/2/2022  EXAMINATION: XR CHEST 1 VIEW CLINICAL HISTORY: AMS, cough; TECHNIQUE: Single frontal view of the chest was performed. COMPARISON: 05/12/2021 FINDINGS: No infiltrates are seen.  Heart size is within normal limits.  The costophrenic angles are clear.     No acute disease is seen Electronically signed by: Etahn Edgar MD Date:    12/02/2022 Time:    20:32    XR CHEST PA AND LATERAL    Result Date: 12/27/2022  EXAMINATION: XR CHEST PA AND LATERAL CLINICAL HISTORY: Other specified symptoms and signs involving the circulatory and respiratory systems covid 1 month ago; URI x 10 days, cough, chest congestion, body aches.; TECHNIQUE: PA and lateral chest radiographs COMPARISON: Chest x-ray dated 12/02/2022 FINDINGS: The heart is normal in size.  The lungs are clear without focal consolidation.  There is no pleural effusion or pneumothorax.  There are mild degenerative changes of the thoracic spine.     No acute abnormality of the chest. Electronically signed by: Fide Ge Date:    12/27/2022 Time:    10:27     Plan:       Offered low dose steroid IM in clinic, pt declined after informed will elevate blood sugar.     Bronchitis  -     XR CHEST PA AND LATERAL    Nasal vestibulitis    Other orders  -     sulfamethoxazole-trimethoprim 800-160mg (BACTRIM DS) 800-160 mg Tab; Take 1 tablet by mouth 2 (two) times daily. for 10 days  Dispense: 20 tablet; Refill: 0  -     benzonatate (TESSALON) 200 MG capsule; Take 1 capsule (200 mg total) by mouth 3 (three) times daily as needed.  Dispense: 30 capsule; Refill: 0  -     mupirocin (BACTROBAN) 2 % ointment; Apply topically 3 (three) times daily. Inside the lower nose. Apply with clean Qtip each side. for 5 days  Dispense: 22 g; Refill: 0       The tip of your nose is likely infected. The condition is called NASAL VESTIBULITIS. This is a staph infection that results from frequent nose blowing and can be very  painful.    Start the antibiotic Rx I sent you today, along with the ointment. Apply inside the bottom of your nose with a clean q tip each side, each time.

## 2022-12-27 NOTE — PATIENT INSTRUCTIONS
The tip of your nose is likely infected. The condition is called NASAL VESTIBULITIS. This is a staph infection that results from frequent nose blowing and can be very painful.    Start the antibiotic Rx I sent you today, along with the ointment. Apply inside the bottom of your nose with a clean q tip each side, each time.

## 2023-02-02 ENCOUNTER — OFFICE VISIT (OUTPATIENT)
Dept: URGENT CARE | Facility: CLINIC | Age: 83
End: 2023-02-02
Payer: COMMERCIAL

## 2023-02-02 ENCOUNTER — HOSPITAL ENCOUNTER (OUTPATIENT)
Dept: RADIOLOGY | Facility: HOSPITAL | Age: 83
Discharge: HOME | End: 2023-02-02
Payer: COMMERCIAL

## 2023-02-02 VITALS
TEMPERATURE: 98 F | WEIGHT: 192.69 LBS | HEIGHT: 62 IN | BODY MASS INDEX: 35.46 KG/M2 | OXYGEN SATURATION: 97 % | SYSTOLIC BLOOD PRESSURE: 176 MMHG | HEART RATE: 80 BPM | DIASTOLIC BLOOD PRESSURE: 84 MMHG | RESPIRATION RATE: 16 BRPM

## 2023-02-02 DIAGNOSIS — M25.562 ACUTE PAIN OF LEFT KNEE: Primary | ICD-10-CM

## 2023-02-02 PROCEDURE — 73562 X-RAY EXAM OF KNEE 3: CPT | Mod: TC,LT

## 2023-02-02 PROCEDURE — 99213 OFFICE O/P EST LOW 20 MIN: CPT | Mod: S$PBB,,,

## 2023-02-02 PROCEDURE — 99213 PR OFFICE/OUTPT VISIT, EST, LEVL III, 20-29 MIN: ICD-10-PCS | Mod: S$PBB,,,

## 2023-02-02 PROCEDURE — 99215 OFFICE O/P EST HI 40 MIN: CPT | Mod: PBBFAC

## 2023-02-02 RX ORDER — HYDROCODONE BITARTRATE AND ACETAMINOPHEN 5; 325 MG/1; MG/1
1 TABLET ORAL EVERY 6 HOURS PRN
Qty: 12 TABLET | Refills: 0 | Status: SHIPPED | OUTPATIENT
Start: 2023-02-02 | End: 2023-02-05

## 2023-02-02 NOTE — PROGRESS NOTES
"Subjective:       Patient ID: Darby Gary is a 82 y.o. female.    Vitals:  height is 5' 2" (1.575 m) and weight is 87.4 kg (192 lb 11.2 oz). Her temperature is 98.1 °F (36.7 °C). Her blood pressure is 176/84 (abnormal) and her pulse is 80. Her respiration is 16 and oxygen saturation is 97%.     Chief Complaint: Knee Pain (Lt knee pain/swelling x 2 days. Denies any injury.)    Pt states knee pain and swelling since yesterday. Denies known injury.     Knee Pain     Constitution: Negative.   HENT: Negative.     Neck: neck negative.   Cardiovascular: Negative.    Eyes: Negative.    Respiratory: Negative.     Gastrointestinal: Negative.    Genitourinary: Negative.    Musculoskeletal:  Positive for pain, joint pain and joint swelling.   Skin: Negative.    Neurological: Negative.      Objective:      Physical Exam   Constitutional: She is oriented to person, place, and time.   HENT:   Head: Normocephalic.   Eyes: Pupils are equal, round, and reactive to light.   Cardiovascular: Normal rate and normal pulses.   Pulmonary/Chest: Effort normal.   Abdominal: Normal appearance. Soft.   Musculoskeletal: Normal range of motion.         General: Swelling and tenderness present. Normal range of motion.      Right knee: Normal.      Left knee: She exhibits swelling.   Neurological: She is alert and oriented to person, place, and time.   Skin: Skin is warm.   Vitals reviewed.      EXAMINATION:  XR KNEE 3 VIEW LEFT     CLINICAL HISTORY:  Pain in left knee     TECHNIQUE:  AP, lateral, and Merchant views of the left knee were performed.     COMPARISON:  None     FINDINGS:  No fracture. No dislocation.   Mild joint space narrowing at the medial compartment.     Regional soft tissues are normal.     LIMITATIONS: Sunrise view limited by artifact related to motion.     Impression:     No acute osseous abnormality.    Assessment:       1. Acute pain of left knee            Plan:         Acute pain of left knee  -     XR KNEE 3 VIEW " LEFT  -     HYDROcodone-acetaminophen (NORCO) 5-325 mg per tablet; Take 1 tablet by mouth every 6 (six) hours as needed for Pain.  Dispense: 12 tablet; Refill: 0    Ace bandage applied to knee for support.    ER precautions given, patient verbalized understanding.     Please see provided patient education for guidance.    Follow up with PCP or return to clinic if symptoms worsen or do not improve.

## 2023-09-04 ENCOUNTER — TELEPHONE (OUTPATIENT)
Dept: URGENT CARE | Facility: CLINIC | Age: 83
End: 2023-09-04

## 2023-09-04 ENCOUNTER — OFFICE VISIT (OUTPATIENT)
Dept: URGENT CARE | Facility: CLINIC | Age: 83
End: 2023-09-04
Payer: COMMERCIAL

## 2023-09-04 VITALS
SYSTOLIC BLOOD PRESSURE: 133 MMHG | TEMPERATURE: 98 F | RESPIRATION RATE: 20 BRPM | OXYGEN SATURATION: 99 % | HEIGHT: 61 IN | DIASTOLIC BLOOD PRESSURE: 77 MMHG | WEIGHT: 166.88 LBS | HEART RATE: 74 BPM | BODY MASS INDEX: 31.51 KG/M2

## 2023-09-04 DIAGNOSIS — Z20.822 EXPOSURE TO COVID-19 VIRUS: Primary | ICD-10-CM

## 2023-09-04 DIAGNOSIS — J02.9 SORE THROAT: ICD-10-CM

## 2023-09-04 DIAGNOSIS — R52 BODY ACHES: ICD-10-CM

## 2023-09-04 LAB
CTP QC/QA: YES
CTP QC/QA: YES
FLUAV AG UPPER RESP QL IA.RAPID: NOT DETECTED
FLUBV AG UPPER RESP QL IA.RAPID: NOT DETECTED
MOLECULAR STREP A: NEGATIVE
RSV A 5' UTR RNA NPH QL NAA+PROBE: NOT DETECTED
SARS-COV-2 RDRP RESP QL NAA+PROBE: NEGATIVE
SARS-COV-2 RNA RESP QL NAA+PROBE: NOT DETECTED

## 2023-09-04 PROCEDURE — 99214 OFFICE O/P EST MOD 30 MIN: CPT | Mod: PBBFAC | Performed by: FAMILY MEDICINE

## 2023-09-04 PROCEDURE — 87635 SARS-COV-2 COVID-19 AMP PRB: CPT | Mod: PBBFAC | Performed by: FAMILY MEDICINE

## 2023-09-04 PROCEDURE — 99213 PR OFFICE/OUTPT VISIT, EST, LEVL III, 20-29 MIN: ICD-10-PCS | Mod: S$PBB,,, | Performed by: FAMILY MEDICINE

## 2023-09-04 PROCEDURE — 99213 OFFICE O/P EST LOW 20 MIN: CPT | Mod: S$PBB,,, | Performed by: FAMILY MEDICINE

## 2023-09-04 PROCEDURE — 87651 STREP A DNA AMP PROBE: CPT | Mod: PBBFAC | Performed by: FAMILY MEDICINE

## 2023-09-04 PROCEDURE — 0241U COVID/RSV/FLU A&B PCR: CPT | Performed by: FAMILY MEDICINE

## 2023-09-04 NOTE — TELEPHONE ENCOUNTER
----- Message from Sadi Christine MD sent at 9/4/2023  4:21 PM CDT -----  Please notify with results

## 2023-09-04 NOTE — PROGRESS NOTES
"Subjective:       Patient ID: Darby Gary is a 83 y.o. female.    Vitals:  height is 5' 1" (1.549 m) and weight is 75.7 kg (166 lb 14.2 oz). Her temperature is 98.2 °F (36.8 °C). Her blood pressure is 133/77 and her pulse is 74. Her respiration is 20 and oxygen saturation is 99%.     Chief Complaint: Sore Throat and Generalized Body Aches (X 1day)    Potential COVID exposure.  1 day of abnormal taste, unsure about sore throat, possible myalgias.  Had a recent school reunion with many people.    Sore Throat   Pertinent negatives include no abdominal pain, congestion, coughing, diarrhea, headaches, neck pain, shortness of breath, swollen glands, trouble swallowing or vomiting.         HENT:  Positive for sore throat. Negative for congestion and trouble swallowing.    Neck: Negative for neck pain.   Respiratory:  Negative for cough and shortness of breath.    Gastrointestinal:  Negative for abdominal pain, vomiting and diarrhea.   Neurological:  Negative for headaches.         Objective:   Physical Exam   Constitutional: She appears well-developed.  Non-toxic appearance. She does not appear ill. No distress.   HENT:   Head: Atraumatic.   Nose: No purulent discharge. Right sinus exhibits no maxillary sinus tenderness and no frontal sinus tenderness. Left sinus exhibits no maxillary sinus tenderness and no frontal sinus tenderness.   Eyes: Right eye exhibits no discharge. Left eye exhibits no discharge. Extraocular movement intact   Neck: Neck supple.   Cardiovascular: Regular rhythm.   Pulmonary/Chest: Effort normal and breath sounds normal. No respiratory distress. She has no wheezes. She has no rales.   Lymphadenopathy:     She has no cervical adenopathy.   Neurological: She is alert.   Skin: Skin is warm, dry and not diaphoretic.   Psychiatric: Her behavior is normal.   Nursing note and vitals reviewed.    Results for orders placed or performed in visit on 09/04/23   POCT COVID-19 Rapid Screening   Result Value " Ref Range    POC Rapid COVID Negative Negative     Acceptable Yes    POCT Strep A, Molecular   Result Value Ref Range    Molecular Strep A, POC Negative Negative     Acceptable Yes          Assessment:     1. Exposure to COVID-19 virus    2. Sore throat    3. Body aches            Plan:   Secondary to COVID exposure in a patient with elevated risk, will obtain an additional swab for COVID PCR and notify if positive.  Patient will follow COVID-19 precautions during meantime.  Monitor symptoms closely.  ER precautions.      Exposure to COVID-19 virus  -     COVID/RSV/FLU A&B PCR    Sore throat  -     POCT COVID-19 Rapid Screening  -     POCT Strep A, Molecular  -     COVID/RSV/FLU A&B PCR    Body aches  -     POCT COVID-19 Rapid Screening  -     COVID/RSV/FLU A&B PCR        Please note: This chart was completed via voice to text dictation. It may contain typographical/word recognition errors. If there are any questions, please contact the provider for final clarification.

## 2023-09-11 ENCOUNTER — HOSPITAL ENCOUNTER (INPATIENT)
Facility: HOSPITAL | Age: 83
LOS: 3 days | Discharge: HOME | DRG: 435 | End: 2023-09-14
Attending: EMERGENCY MEDICINE | Admitting: INTERNAL MEDICINE
Payer: COMMERCIAL

## 2023-09-11 DIAGNOSIS — K86.89 PANCREATIC MASS: ICD-10-CM

## 2023-09-11 DIAGNOSIS — R07.9 CHEST PAIN: ICD-10-CM

## 2023-09-11 DIAGNOSIS — R17 JAUNDICE: Primary | ICD-10-CM

## 2023-09-11 DIAGNOSIS — D49.0 PANCREATIC NEOPLASM: ICD-10-CM

## 2023-09-11 LAB
ALBUMIN SERPL-MCNC: 3.4 G/DL (ref 3.4–4.8)
ALBUMIN/GLOB SERPL: 0.8 RATIO (ref 1.1–2)
ALP SERPL-CCNC: 1113 UNIT/L (ref 40–150)
ALT SERPL-CCNC: 233 UNIT/L (ref 0–55)
AMORPH URATE CRY URNS QL MICRO: ABNORMAL /UL
APPEARANCE UR: ABNORMAL
AST SERPL-CCNC: 263 UNIT/L (ref 5–34)
BASOPHILS # BLD AUTO: 0.07 X10(3)/MCL
BASOPHILS NFR BLD AUTO: 0.7 %
BILIRUB SERPL-MCNC: 2.3 MG/DL
BILIRUB UR QL STRIP.AUTO: ABNORMAL
BILIRUBIN DIRECT+TOT PNL SERPL-MCNC: 1.6 MG/DL (ref 0–?)
BUN SERPL-MCNC: 22.4 MG/DL (ref 9.8–20.1)
CALCIUM SERPL-MCNC: 9.8 MG/DL (ref 8.4–10.2)
CHLORIDE SERPL-SCNC: 100 MMOL/L (ref 98–107)
CO2 SERPL-SCNC: 30 MMOL/L (ref 23–31)
COLOR UR: ABNORMAL
CREAT SERPL-MCNC: 0.91 MG/DL (ref 0.55–1.02)
EOSINOPHIL # BLD AUTO: 0.53 X10(3)/MCL (ref 0–0.9)
EOSINOPHIL NFR BLD AUTO: 5.3 %
ERYTHROCYTE [DISTWIDTH] IN BLOOD BY AUTOMATED COUNT: 13.7 % (ref 11.5–17)
GFR SERPLBLD CREATININE-BSD FMLA CKD-EPI: >60 MLS/MIN/1.73/M2
GLOBULIN SER-MCNC: 4.3 GM/DL (ref 2.4–3.5)
GLUCOSE SERPL-MCNC: 159 MG/DL (ref 82–115)
GLUCOSE UR QL STRIP.AUTO: NEGATIVE
HAV IGM SERPL QL IA: NONREACTIVE
HBV CORE IGM SERPL QL IA: NONREACTIVE
HBV SURFACE AG SERPL QL IA: NONREACTIVE
HCT VFR BLD AUTO: 40.1 % (ref 37–47)
HCV AB SERPL QL IA: NONREACTIVE
HGB BLD-MCNC: 12.8 G/DL (ref 12–16)
IMM GRANULOCYTES # BLD AUTO: 0.02 X10(3)/MCL (ref 0–0.04)
IMM GRANULOCYTES NFR BLD AUTO: 0.2 %
KETONES UR QL STRIP.AUTO: NEGATIVE
LEUKOCYTE ESTERASE UR QL STRIP.AUTO: NEGATIVE
LIPASE SERPL-CCNC: 7 U/L
LYMPHOCYTES # BLD AUTO: 2.43 X10(3)/MCL (ref 0.6–4.6)
LYMPHOCYTES NFR BLD AUTO: 24.4 %
MCH RBC QN AUTO: 28.7 PG (ref 27–31)
MCHC RBC AUTO-ENTMCNC: 31.9 G/DL (ref 33–36)
MCV RBC AUTO: 89.9 FL (ref 80–94)
MONOCYTES # BLD AUTO: 0.88 X10(3)/MCL (ref 0.1–1.3)
MONOCYTES NFR BLD AUTO: 8.8 %
NEUTROPHILS # BLD AUTO: 6.04 X10(3)/MCL (ref 2.1–9.2)
NEUTROPHILS NFR BLD AUTO: 60.6 %
NITRITE UR QL STRIP.AUTO: NEGATIVE
NRBC BLD AUTO-RTO: 0 %
PH UR STRIP.AUTO: 5.5 [PH]
PLATELET # BLD AUTO: 283 X10(3)/MCL (ref 130–400)
PMV BLD AUTO: 11.3 FL (ref 7.4–10.4)
POTASSIUM SERPL-SCNC: 3.9 MMOL/L (ref 3.5–5.1)
PROT SERPL-MCNC: 7.7 GM/DL (ref 5.8–7.6)
PROT UR QL STRIP.AUTO: ABNORMAL
RBC # BLD AUTO: 4.46 X10(6)/MCL (ref 4.2–5.4)
RBC #/AREA URNS AUTO: ABNORMAL /HPF
RBC UR QL AUTO: NEGATIVE
SODIUM SERPL-SCNC: 139 MMOL/L (ref 136–145)
SP GR UR STRIP.AUTO: 1.03 (ref 1–1.03)
SQUAMOUS #/AREA URNS LPF: ABNORMAL /HPF
UROBILINOGEN UR STRIP-ACNC: 4
WBC # SPEC AUTO: 9.97 X10(3)/MCL (ref 4.5–11.5)
WBC #/AREA URNS AUTO: ABNORMAL /HPF

## 2023-09-11 PROCEDURE — 82378 CARCINOEMBRYONIC ANTIGEN: CPT

## 2023-09-11 PROCEDURE — 85610 PROTHROMBIN TIME: CPT

## 2023-09-11 PROCEDURE — 85025 COMPLETE CBC W/AUTO DIFF WBC: CPT | Performed by: PHYSICIAN ASSISTANT

## 2023-09-11 PROCEDURE — 83605 ASSAY OF LACTIC ACID: CPT

## 2023-09-11 PROCEDURE — 11000001 HC ACUTE MED/SURG PRIVATE ROOM

## 2023-09-11 PROCEDURE — 81001 URINALYSIS AUTO W/SCOPE: CPT | Performed by: PHYSICIAN ASSISTANT

## 2023-09-11 PROCEDURE — 85730 THROMBOPLASTIN TIME PARTIAL: CPT

## 2023-09-11 PROCEDURE — 82977 ASSAY OF GGT: CPT | Performed by: STUDENT IN AN ORGANIZED HEALTH CARE EDUCATION/TRAINING PROGRAM

## 2023-09-11 PROCEDURE — 83690 ASSAY OF LIPASE: CPT | Performed by: PHYSICIAN ASSISTANT

## 2023-09-11 PROCEDURE — 80053 COMPREHEN METABOLIC PANEL: CPT | Performed by: PHYSICIAN ASSISTANT

## 2023-09-11 PROCEDURE — 99285 EMERGENCY DEPT VISIT HI MDM: CPT | Mod: 25

## 2023-09-11 PROCEDURE — 80074 ACUTE HEPATITIS PANEL: CPT | Performed by: PHYSICIAN ASSISTANT

## 2023-09-11 PROCEDURE — 25500020 PHARM REV CODE 255

## 2023-09-11 PROCEDURE — 82248 BILIRUBIN DIRECT: CPT | Performed by: PHYSICIAN ASSISTANT

## 2023-09-11 PROCEDURE — 86301 IMMUNOASSAY TUMOR CA 19-9: CPT

## 2023-09-11 RX ORDER — IBUPROFEN 200 MG
24 TABLET ORAL
Status: DISCONTINUED | OUTPATIENT
Start: 2023-09-12 | End: 2023-09-14 | Stop reason: HOSPADM

## 2023-09-11 RX ORDER — ENOXAPARIN SODIUM 100 MG/ML
40 INJECTION SUBCUTANEOUS EVERY 24 HOURS
Status: DISCONTINUED | OUTPATIENT
Start: 2023-09-12 | End: 2023-09-14 | Stop reason: HOSPADM

## 2023-09-11 RX ORDER — NALOXONE HCL 0.4 MG/ML
0.02 VIAL (ML) INJECTION
Status: DISCONTINUED | OUTPATIENT
Start: 2023-09-12 | End: 2023-09-14 | Stop reason: HOSPADM

## 2023-09-11 RX ORDER — IBUPROFEN 200 MG
16 TABLET ORAL
Status: DISCONTINUED | OUTPATIENT
Start: 2023-09-12 | End: 2023-09-14 | Stop reason: HOSPADM

## 2023-09-11 RX ORDER — INSULIN ASPART 100 [IU]/ML
0-5 INJECTION, SOLUTION INTRAVENOUS; SUBCUTANEOUS
Status: DISCONTINUED | OUTPATIENT
Start: 2023-09-12 | End: 2023-09-14 | Stop reason: HOSPADM

## 2023-09-11 RX ORDER — SODIUM CHLORIDE 0.9 % (FLUSH) 0.9 %
10 SYRINGE (ML) INJECTION EVERY 12 HOURS PRN
Status: DISCONTINUED | OUTPATIENT
Start: 2023-09-12 | End: 2023-09-14 | Stop reason: HOSPADM

## 2023-09-11 RX ORDER — GLUCAGON 1 MG
1 KIT INJECTION
Status: DISCONTINUED | OUTPATIENT
Start: 2023-09-12 | End: 2023-09-14 | Stop reason: HOSPADM

## 2023-09-11 RX ADMIN — IOHEXOL 100 ML: 350 INJECTION, SOLUTION INTRAVENOUS at 08:09

## 2023-09-11 NOTE — ED PROVIDER NOTES
Encounter Date: 9/11/2023    I, Prosper Clark MD, did conduct a face to face encounter with this patient initially seen by our advanced practice provider. I did perform a history and physical, did direct the evaluation and management, and do agree with the care as documented.         History     Chief Complaint   Patient presents with    abnormal labs     Pt states has abnormal labs for liver and pcp requests ultrasound of liver to be done in er     84 yo F w/ PMHx significant for HTN, HLD, CKD & DM presents to ED per her nephrologist. Patient's nephrologist called & told her liver labs were abnormal & she needed to come to ED for imaging. Via chart review, labs from 3 days ago show T bili 1.9, alk phos 1183,  & . Patient reports diarrhea several week ago, but denies currently. Also reports over the past 2 months she has had intermittent dark urine & pale stool. Also reports decreased appetite, early satiety & approx 15 lb weight loss over recent 3 week period. States she saw something red in stool, but had tests done & was told there was no blood in stool. Reports hx of polyps on colonoscopy, but otherwise denies hx of abnormal colonoscopy. Denies N/V, constipation, abdominal pain, abdominal distension, jaundice, night sweats, F/C, CP, SOB, HA, dizziness, AMS. VSS on arrival, patient in NAD.        Review of patient's allergies indicates:  No Known Allergies  Past Medical History:   Diagnosis Date    Arthritis     Hyperlipidemia     Hypertension     Osteoporosis      Past Surgical History:   Procedure Laterality Date    COLONOSCOPY       Family History   Problem Relation Age of Onset    Leukemia Father     Cancer Mother      Social History     Tobacco Use    Smoking status: Former     Types: Cigarettes    Smokeless tobacco: Never    Tobacco comments:     Quit over 25 years ago.    Substance Use Topics    Alcohol use: Never    Drug use: Never     Review of Systems   All other systems reviewed and  are negative.      Physical Exam     Initial Vitals [09/11/23 1713]   BP Pulse Resp Temp SpO2   (!) 150/82 82 20 97.7 °F (36.5 °C) 98 %      MAP       --         Physical Exam    Nursing note and vitals reviewed.  Constitutional: She appears well-developed and well-nourished. She is not diaphoretic. No distress.   HENT:   Head: Normocephalic and atraumatic.   Mouth/Throat: Oropharynx is clear and moist. No oropharyngeal exudate.   Eyes: Conjunctivae and EOM are normal. Pupils are equal, round, and reactive to light. No scleral icterus.   Neck: Neck supple. No JVD present.   Normal range of motion.  Cardiovascular:  Normal rate, regular rhythm, normal heart sounds and intact distal pulses.     Exam reveals no gallop and no friction rub.       No murmur heard.  Pulmonary/Chest: Breath sounds normal. No respiratory distress. She has no wheezes. She has no rhonchi. She has no rales.   Abdominal: Abdomen is soft. Bowel sounds are normal. She exhibits no distension, no fluid wave and no mass. There is no abdominal tenderness.   No right CVA tenderness.  No left CVA tenderness. There is no rebound, no guarding, no tenderness at McBurney's point and negative Pal's sign. negative Rovsing's sign  Musculoskeletal:         General: No tenderness or edema. Normal range of motion.      Cervical back: Normal range of motion and neck supple.     Neurological: She is alert and oriented to person, place, and time. She has normal strength. GCS score is 15. GCS eye subscore is 4. GCS verbal subscore is 5. GCS motor subscore is 6.   Skin: Skin is warm and dry. Capillary refill takes less than 2 seconds. No rash noted. No pallor.   Psychiatric: She has a normal mood and affect. Thought content normal.         ED Course   Procedures  Labs Reviewed   COMPREHENSIVE METABOLIC PANEL - Abnormal; Notable for the following components:       Result Value    Glucose Level 159 (*)     Blood Urea Nitrogen 22.4 (*)     Protein Total 7.7 (*)      Globulin 4.3 (*)     Albumin/Globulin Ratio 0.8 (*)     Bilirubin Total 2.3 (*)     Alkaline Phosphatase 1,113 (*)     Alanine Aminotransferase 233 (*)     Aspartate Aminotransferase 263 (*)     All other components within normal limits   CBC WITH DIFFERENTIAL - Abnormal; Notable for the following components:    MCHC 31.9 (*)     MPV 11.3 (*)     All other components within normal limits   BILIRUBIN, DIRECT - Abnormal; Notable for the following components:    Bilirubin Direct 1.6 (*)     All other components within normal limits   URINALYSIS, REFLEX TO URINE CULTURE - Abnormal; Notable for the following components:    Appearance, UA Turbid (*)     Protein, UA 1+ (*)     Bilirubin, UA 1+ (*)     Urobilinogen, UA +4 (*)     All other components within normal limits   HEPATITIS PANEL, ACUTE - Normal   LIPASE - Normal   CBC W/ AUTO DIFFERENTIAL    Narrative:     The following orders were created for panel order CBC Auto Differential.  Procedure                               Abnormality         Status                     ---------                               -----------         ------                     CBC with Differential[[id number]]       Abnormal            Final result                 Please view results for these tests on the individual orders.   EXTRA TUBES    Narrative:     The following orders were created for panel order EXTRA TUBES.  Procedure                               Abnormality         Status                     ---------                               -----------         ------                     Light Blue Top Hold[[id number]]                             In process                 Lavender Top Hold[[id number]]                               In process                 Gold Top Hold[[id number]]                                   In process                   Please view results for these tests on the individual orders.   LIGHT BLUE TOP HOLD   LAVENDER TOP HOLD   GOLD TOP HOLD   PATHOLOGIST  INTERPRETATION          Imaging Results              CT Abdomen Pelvis With Contrast (Preliminary result)  Result time 09/11/23 21:19:25      Preliminary result by Troy Potter MD (09/11/23 21:19:25)                   Narrative:    START OF REPORT:  Technique: CT of the abdomen and pelvis was performed with axial images as well as sagittal and coronal reconstruction images with intravenous contrast.    Comparison: None available.    Clinical History: Abnormal LFTS, recent weight loss, concern for malignancy.    Dosage Information: Automated Exposure Control was utilized 323.45 mGy.cm.    Findings:  Lines and Tubes: None.  Thorax:  Lungs: The visualized lung bases appear unremarkable.  Pleura: No pleural effusion is seen.  Heart: The heart size is within normal limits.  Abdomen:  Abdominal Wall: No abdominal wall pathology is seen.  Biliary System: There is mild dilatation of the intra and extrahepatic biliary ducts. The pancreatic duct is also moderately dilated. There is a subtle 2.4 x 2 cm relatively hypodense lesion in the head of the pancreas with ill-defined margins (series 2, image 55). A few mildly prominent periportal lymph nodes are also seen. The liver otherwise appears unremarkable.  Gallbladder: The gallbladder is distended but otherwise appears unremarkable.  Pancreas: As noted above there is dilatation of the pancreatic duct with a pancreatic head mass suspicious for a neoplasm. Recommend additional evaluation and follow-up as indicated.  Spleen: The spleen appears unremarkable.  Adrenals: The adrenal glands appear unremarkable.  Kidneys: The right kidney appears unremarkable with no stones cysts masses or hydronephrosis. A single cyst measuring 9 mm is seen on series 2, image 57 in the upper pole of the left kidney. The left kidney otherwise appears unremarkable with no stones masses or hydronephrosis identified.  Aorta: There is mild calcification of the abdominal aorta and its branches.  IVC:  Unremarkable.  Bowel:  Esophagus: There is a small hiatal hernia.  Stomach: The stomach appears unremarkable.  Duodenum: Unremarkable appearing duodenum.  Small Bowel: The small bowel appears unremarkable.  Colon: Nondistended.  Appendix: The appendix is not identified but no inflammatory changes are seen in the right lower quadrant to suggest appendicitis.  Peritoneum: No intraperitoneal free air or ascites is seen.    Pelvis:  Bladder: The bladder appears unremarkable.  Female:  Uterus: The uterus demonstrates lobulated contour with focal calcifications, likely related to fibroids.  Ovaries: No adnexal masses are seen.    Bony structures:  Dorsal Spine: There is mild spondylosis of the visualized dorsal spine.  Bony Pelvis: The visualized bony structures of the pelvis appear unremarkable.    Notifications: The results were discussed with the emergency room physician (Dr Clark) prior to dictation at 2023-09-11 21:51:56 CDT.      Impression:  1. There is mild dilatation of the intra and extrahepatic biliary ducts. The pancreatic duct is also moderately dilated. There is a subtle 2.4 x 2 cm relatively hypodense lesion in the head of the pancreas with ill-defined margins (series 2, image 55). This is suspicious for pancreatic neoplasm. Correlate with clinical and laboratory findings and recommend additional evaluation and follow-up as indicated.  2. Details and other findings as discussed above.                                      X-Rays:   Independently Interpreted Readings:   Other Readings:  Ct abd/pelvis demonstrates probable pancreatic head mass;     Medications   iohexoL (OMNIPAQUE 350) 350 mg iodine/mL injection (100 mLs Intravenous Given 9/11/23 2015)     Medical Decision Making  DDx: malignancy, pancreatitis, cholecystitis, hepatitis among other possibilities    ED management: remains stable in the ED     ED course: objective data compatible with pancreatic neoplastic process    Amount and/or Complexity  of Data Reviewed  External Data Reviewed: labs.  Labs: ordered. Decision-making details documented in ED Course.  Radiology: ordered and independent interpretation performed. Decision-making details documented in ED Course.  Discussion of management or test interpretation with external provider(s): In patient team aware of case, plan admit for further evaluation    Risk  Decision regarding hospitalization.               ED Course as of 09/11/23 2242   Mon Sep 11, 2023   1900 Patient care transitioned to Dr. Clark [NB]   1907 Reassuring hemogram ; [CT]   2028 Negative acute hepatitis panel ; [CT]      ED Course User Index  [CT] Prosper Clark MD  [NB] Beck Bro PA                    Clinical Impression:   Final diagnoses:  [R17] Jaundice (Primary)  [D49.0] Pancreatic neoplasm        ED Disposition Condition    Observation Stable                Prosper Clark MD  09/11/23 2242

## 2023-09-11 NOTE — FIRST PROVIDER EVALUATION
Medical screening examination initiated.  I have conducted a focused provider triage encounter, findings are as follows:    Brief history of present illness:  Patient's nephrologist called her & told her to come to ED for further evaluation due to elevated liver labs. Via chart review, labs from 3 days ago reveal T bili of 1.9, alk phos of 1183, AST of 226 & ALT of 224. Denies all complaints currently including F/C, N/V/D, abdominal pain, jaundice.    There were no vitals filed for this visit.    Pertinent physical exam:  VSS, NAD    Brief workup plan:  labs ordered    Preliminary workup initiated; this workup will be continued and followed by the physician or advanced practice provider that is assigned to the patient when roomed.

## 2023-09-12 PROBLEM — K86.89 PANCREATIC MASS: Status: ACTIVE | Noted: 2023-09-12

## 2023-09-12 PROBLEM — E44.0 MODERATE MALNUTRITION: Status: ACTIVE | Noted: 2023-09-12

## 2023-09-12 LAB
ALBUMIN SERPL-MCNC: 3.1 G/DL (ref 3.4–4.8)
ALBUMIN/GLOB SERPL: 0.8 RATIO (ref 1.1–2)
ALP SERPL-CCNC: 1021 UNIT/L (ref 40–150)
ALT SERPL-CCNC: 204 UNIT/L (ref 0–55)
APTT PPP: 31.2 SECONDS (ref 23.2–33.7)
AST SERPL-CCNC: 200 UNIT/L (ref 5–34)
BASOPHILS # BLD AUTO: 0.08 X10(3)/MCL
BASOPHILS NFR BLD AUTO: 0.8 %
BILIRUB SERPL-MCNC: 2.1 MG/DL
BUN SERPL-MCNC: 17.1 MG/DL (ref 9.8–20.1)
CALCIUM SERPL-MCNC: 9.4 MG/DL (ref 8.4–10.2)
CANCER AG19-9 SERPL-ACNC: 2461.89 UNIT/ML (ref 0–37)
CEA SERPL-MCNC: 1.96 NG/ML (ref 0–3)
CHLORIDE SERPL-SCNC: 102 MMOL/L (ref 98–107)
CO2 SERPL-SCNC: 27 MMOL/L (ref 23–31)
CREAT SERPL-MCNC: 0.74 MG/DL (ref 0.55–1.02)
EOSINOPHIL # BLD AUTO: 0.59 X10(3)/MCL (ref 0–0.9)
EOSINOPHIL NFR BLD AUTO: 5.6 %
ERYTHROCYTE [DISTWIDTH] IN BLOOD BY AUTOMATED COUNT: 13.4 % (ref 11.5–17)
GFR SERPLBLD CREATININE-BSD FMLA CKD-EPI: >60 MLS/MIN/1.73/M2
GGT SERPL-CCNC: 921 U/L (ref 9–36)
GLOBULIN SER-MCNC: 4 GM/DL (ref 2.4–3.5)
GLUCOSE SERPL-MCNC: 138 MG/DL (ref 82–115)
HCT VFR BLD AUTO: 39.5 % (ref 37–47)
HGB BLD-MCNC: 12.8 G/DL (ref 12–16)
IMM GRANULOCYTES # BLD AUTO: 0.02 X10(3)/MCL (ref 0–0.04)
IMM GRANULOCYTES NFR BLD AUTO: 0.2 %
INR PPP: 1.1
LACTATE SERPL-SCNC: 0.8 MMOL/L (ref 0.5–2.2)
LYMPHOCYTES # BLD AUTO: 2.83 X10(3)/MCL (ref 0.6–4.6)
LYMPHOCYTES NFR BLD AUTO: 27 %
MAGNESIUM SERPL-MCNC: 2 MG/DL (ref 1.6–2.6)
MCH RBC QN AUTO: 28.6 PG (ref 27–31)
MCHC RBC AUTO-ENTMCNC: 32.4 G/DL (ref 33–36)
MCV RBC AUTO: 88.2 FL (ref 80–94)
MONOCYTES # BLD AUTO: 0.95 X10(3)/MCL (ref 0.1–1.3)
MONOCYTES NFR BLD AUTO: 9.1 %
NEUTROPHILS # BLD AUTO: 6.01 X10(3)/MCL (ref 2.1–9.2)
NEUTROPHILS NFR BLD AUTO: 57.3 %
NRBC BLD AUTO-RTO: 0 %
PHOSPHATE SERPL-MCNC: 3.2 MG/DL (ref 2.3–4.7)
PLATELET # BLD AUTO: 306 X10(3)/MCL (ref 130–400)
PMV BLD AUTO: 10.5 FL (ref 7.4–10.4)
POCT GLUCOSE: 122 MG/DL (ref 70–110)
POCT GLUCOSE: 143 MG/DL (ref 70–110)
POCT GLUCOSE: 144 MG/DL (ref 70–110)
POTASSIUM SERPL-SCNC: 3.5 MMOL/L (ref 3.5–5.1)
PROT SERPL-MCNC: 7.1 GM/DL (ref 5.8–7.6)
PROTHROMBIN TIME: 13.9 SECONDS (ref 11.4–14)
RBC # BLD AUTO: 4.48 X10(6)/MCL (ref 4.2–5.4)
SODIUM SERPL-SCNC: 139 MMOL/L (ref 136–145)
WBC # SPEC AUTO: 10.48 X10(3)/MCL (ref 4.5–11.5)

## 2023-09-12 PROCEDURE — 80053 COMPREHEN METABOLIC PANEL: CPT

## 2023-09-12 PROCEDURE — 84100 ASSAY OF PHOSPHORUS: CPT

## 2023-09-12 PROCEDURE — 85025 COMPLETE CBC W/AUTO DIFF WBC: CPT

## 2023-09-12 PROCEDURE — 25000003 PHARM REV CODE 250

## 2023-09-12 PROCEDURE — 83735 ASSAY OF MAGNESIUM: CPT

## 2023-09-12 PROCEDURE — 11000001 HC ACUTE MED/SURG PRIVATE ROOM

## 2023-09-12 PROCEDURE — 63600175 PHARM REV CODE 636 W HCPCS

## 2023-09-12 RX ORDER — ONDANSETRON 2 MG/ML
4 INJECTION INTRAMUSCULAR; INTRAVENOUS EVERY 8 HOURS PRN
Status: DISCONTINUED | OUTPATIENT
Start: 2023-09-12 | End: 2023-09-14 | Stop reason: HOSPADM

## 2023-09-12 RX ORDER — HYDROXYZINE PAMOATE 25 MG/1
25 CAPSULE ORAL ONCE
Status: COMPLETED | OUTPATIENT
Start: 2023-09-12 | End: 2023-09-12

## 2023-09-12 RX ORDER — HYDROXYZINE PAMOATE 25 MG/1
25 CAPSULE ORAL EVERY 6 HOURS PRN
Status: DISCONTINUED | OUTPATIENT
Start: 2023-09-12 | End: 2023-09-14 | Stop reason: HOSPADM

## 2023-09-12 RX ORDER — HYDROCORTISONE 1 %
CREAM (GRAM) TOPICAL 2 TIMES DAILY PRN
Status: DISCONTINUED | OUTPATIENT
Start: 2023-09-12 | End: 2023-09-14 | Stop reason: HOSPADM

## 2023-09-12 RX ADMIN — HYDROXYZINE PAMOATE 25 MG: 25 CAPSULE ORAL at 02:09

## 2023-09-12 RX ADMIN — ENOXAPARIN SODIUM 40 MG: 40 INJECTION SUBCUTANEOUS at 04:09

## 2023-09-12 RX ADMIN — HYDROCORTISONE: 10 CREAM TOPICAL at 10:09

## 2023-09-12 RX ADMIN — HYDROXYZINE PAMOATE 25 MG: 25 CAPSULE ORAL at 08:09

## 2023-09-12 NOTE — H&P
[facility] History and Physical       Chief Complaint:      Abnormal liver function enzymes  Subjective:     History of Present Illness:  Darby Gary is a 83 y.o. female with PMH of HTN, HLD, Diabetes mellitus type 2 who presented to [facility] ED on 9/11/2023 due to abnormal labs. She was asked by her Nephrologist to come in due to elevated liver enzymes on recent blood work.  Patient reports a 2-3 month history of unintentional weight loss, pale stools and dark colored urine. She reports increased itchiness in her abdomen and back x 3 weeks. Denies any vaginal bleeding, N/V, hx of blood clots, dysuria, increased urinary frequency, shortness of breath, chest pain. She is not on any blood thinners.   CMP in the ED today revealed ALT - 233, AST - 263, ALP - 1113. Hepatitis panel was non reactive. Lipase wnl, Direct bilirubin elevated.   Positive family hx of colon cancer. Last colonoscopy was 3 years ago; patient had polyps that were removed at the time.   CT abdomen pelvis with contrast was done and it revealed mid dilatation of the intra and extrahepatic biliary ducts, and the pancreatic duct with a subtle 2.4 x 2 cm relatively hypodense lesion on the head of the pancreas with ill defined margines; suspicious for pancreatic neoplasm. A few mildly prominent periportal lymph nodes were also seen on CT.   Patient was admitted for GI consult for further work up of pancreatic mass.    Review of Systems:  As per HPI.    Past Medical History:   ----------------------------  Arthritis  Hyperlipidemia  Hypertension  Osteoporosis  Past Surgical History:  Past Surgical History:   Procedure Laterality Date    COLONOSCOPY       Allergies:  Review of patient's allergies indicates:  No Known Allergies  Home Medications:  Prior to Admission medications    Medication Sig Start Date End Date Taking? Authorizing Provider   ergocalciferol (ERGOCALCIFEROL) 50,000 unit Cap Take 50,000 Units by mouth every 7 days. 5/26/23    "Provider, Historical   gabapentin (NEURONTIN) 300 MG capsule Take 300-600 mg by mouth every evening. 11/3/22   Provider, Historical   lisinopriL-hydrochlorothiazide (PRINZIDE,ZESTORETIC) 10-12.5 mg per tablet  8/15/22   Provider, Historical   metFORMIN (GLUCOPHAGE) 500 MG tablet Take 500 mg by mouth once daily. 22   Provider, Historical   mupirocin (BACTROBAN) 2 % ointment mupirocin 2 % topical ointment    Provider, Historical   potassium chloride (KLOR-CON) 10 MEQ TbSR Take 10 mEq by mouth once daily. 22   Provider, Historical   simvastatin (ZOCOR) 40 MG tablet SMARTSI Tablet(s) By Mouth Every Evening 8/15/22   Provider, Historical   triamcinolone acetonide 0.025% (KENALOG) 0.025 % cream triamcinolone acetonide 0.025 % topical cream   APPLY TO THE AFFECTED AREA(S) ONCE a DAY    Provider, Historical     Family History:  Family History   Problem Relation Age of Onset    Leukemia Father     Cancer Mother      Social History:  Social History     Tobacco Use    Smoking status: Former     Types: Cigarettes    Smokeless tobacco: Never    Tobacco comments:     Quit over 25 years ago.    Substance Use Topics    Alcohol use: Never    Drug use: Never     Objective:   Vitals  Vitals  BP: (!) 150/82  Temp: 97.7 °F (36.5 °C)  Temp Source: Temporal  Pulse: 82  Resp: 20  SpO2: 98 %  Height: 5' 1" (154.9 cm)  Weight: 75 kg (165 lb 5.5 oz)    Physical Exam  Vitals and nursing note reviewed.   Constitutional:       General: She is not in acute distress.  HENT:      Mouth/Throat:      Mouth: Mucous membranes are moist.   Eyes:      Comments: Mildly jaundiced   Cardiovascular:      Rate and Rhythm: Normal rate and regular rhythm.      Pulses: Normal pulses.      Heart sounds: Normal heart sounds.   Pulmonary:      Effort: Pulmonary effort is normal. No respiratory distress.      Breath sounds: Normal breath sounds.   Abdominal:      General: Bowel sounds are normal. There is no distension.      Palpations: Abdomen is soft. "      Tenderness: There is no abdominal tenderness. There is no right CVA tenderness, left CVA tenderness or guarding.   Musculoskeletal:         General: No tenderness.      Right lower leg: No edema.      Left lower leg: No edema.   Skin:     General: Skin is warm.   Neurological:      General: No focal deficit present.      Mental Status: She is alert and oriented to person, place, and time.   Psychiatric:         Mood and Affect: Mood normal.         Behavior: Behavior is cooperative.            Radiology:  No results found in the last 24 hours.     Imaging Results              CT Abdomen Pelvis With Contrast (Preliminary result)  Result time 09/11/23 21:19:25      Preliminary result by Troy Potter MD (09/11/23 21:19:25)                   Narrative:    START OF REPORT:  Technique: CT of the abdomen and pelvis was performed with axial images as well as sagittal and coronal reconstruction images with intravenous contrast.    Comparison: None available.    Clinical History: Abnormal LFTS, recent weight loss, concern for malignancy.    Dosage Information: Automated Exposure Control was utilized 323.45 mGy.cm.    Findings:  Lines and Tubes: None.  Thorax:  Lungs: The visualized lung bases appear unremarkable.  Pleura: No pleural effusion is seen.  Heart: The heart size is within normal limits.  Abdomen:  Abdominal Wall: No abdominal wall pathology is seen.  Biliary System: There is mild dilatation of the intra and extrahepatic biliary ducts. The pancreatic duct is also moderately dilated. There is a subtle 2.4 x 2 cm relatively hypodense lesion in the head of the pancreas with ill-defined margins (series 2, image 55). A few mildly prominent periportal lymph nodes are also seen. The liver otherwise appears unremarkable.  Gallbladder: The gallbladder is distended but otherwise appears unremarkable.  Pancreas: As noted above there is dilatation of the pancreatic duct with a pancreatic head mass suspicious for a  neoplasm. Recommend additional evaluation and follow-up as indicated.  Spleen: The spleen appears unremarkable.  Adrenals: The adrenal glands appear unremarkable.  Kidneys: The right kidney appears unremarkable with no stones cysts masses or hydronephrosis. A single cyst measuring 9 mm is seen on series 2, image 57 in the upper pole of the left kidney. The left kidney otherwise appears unremarkable with no stones masses or hydronephrosis identified.  Aorta: There is mild calcification of the abdominal aorta and its branches.  IVC: Unremarkable.  Bowel:  Esophagus: There is a small hiatal hernia.  Stomach: The stomach appears unremarkable.  Duodenum: Unremarkable appearing duodenum.  Small Bowel: The small bowel appears unremarkable.  Colon: Nondistended.  Appendix: The appendix is not identified but no inflammatory changes are seen in the right lower quadrant to suggest appendicitis.  Peritoneum: No intraperitoneal free air or ascites is seen.    Pelvis:  Bladder: The bladder appears unremarkable.  Female:  Uterus: The uterus demonstrates lobulated contour with focal calcifications, likely related to fibroids.  Ovaries: No adnexal masses are seen.    Bony structures:  Dorsal Spine: There is mild spondylosis of the visualized dorsal spine.  Bony Pelvis: The visualized bony structures of the pelvis appear unremarkable.    Notifications: The results were discussed with the emergency room physician (Dr Clark) prior to dictation at 2023-09-11 21:51:56 CDT.      Impression:  1. There is mild dilatation of the intra and extrahepatic biliary ducts. The pancreatic duct is also moderately dilated. There is a subtle 2.4 x 2 cm relatively hypodense lesion in the head of the pancreas with ill-defined margins (series 2, image 55). This is suspicious for pancreatic neoplasm. Correlate with clinical and laboratory findings and recommend additional evaluation and follow-up as indicated.  2. Details and other findings as  discussed above.                                        Assessment & Plan:   Pancreatic mass  Elevated liver enzymes and direct bilirubin  - CT abdomen pelvis with contrast was done and it revealed mid dilatation of the intra and extrahepatic biliary ducts, and the pancreatic duct with a subtle 2.4 x 2 cm relatively hypodense lesion on the head of the pancreas with ill defined margines; suspicious for pancreatic neoplasm. A few mildly prominent periportal lymph nodes were also seen on CT.   - CEA wnl; CA 19-9 pending; PT/INR - 13.9/1.1; PTT 31.2  - Hydroxyzine and cortisone cream PRN for itching  - GI consult placed; team will need to call them in the morning  - Repeat CMP in am      Diabetes mellitus type 2  HTN/HLD  - Hold home metformin  - Insulin sliding scale  - Hold statin due to elevated liver enzymes  - Hold lisinopril-HCTZ, can restart in am      CODE STATUS: Full  Access: PIV  Antibiotics: None  Diet: NPO except for sips with medication  DVT ppx: Lovenox  GI ppx: None  Fluids: None      Disposition: Patient admitted due to newly found pancreatic head mass. She will need GI consult for further work up and management.         Yi Paredes M.D  [facility] Resident, [facility]

## 2023-09-12 NOTE — ASSESSMENT & PLAN NOTE
Nutrition consulted. Most recent weight and BMI monitored-     Measurements:  Wt Readings from Last 1 Encounters:   09/11/23 75 kg (165 lb 5.5 oz)   Body mass index is 31.24 kg/m².    Patient has been screened and assessed by RD.    Malnutrition Type:  Context: chronic illness  Level: moderate    Malnutrition Characteristic Summary:  Weight Loss (Malnutrition): greater than 10% in 6 months  Energy Intake (Malnutrition): less than 75% for greater than or equal to 1 month    Interventions/Recommendations (treatment strategy):

## 2023-09-12 NOTE — CONSULTS
[facility] Consult Note     Patient Name: Darby Gary  MRN: [id number]  Admission Date: 9/11/2023  Current Hospital Day: 2   Code Status: Full Code  Attending Provider: Yessenia Clark MD    Reason for Consult:      Pancreatic mass    Subjective:     History of Present Illness:  Darby Gary is a 83 y.o. female with PMH of hypertension, hyperlipidemia, type 2 diabetes who was sent to the ED by her nephrologist due to elevated liver enzymes on her recent blood work.  Patient did report 2-3 months history of unintentional weight loss (30 lbs total), pale stools and dark colored urine.  She also endorsed increased pruritus in her abdomen as well as her back over the last 3 weeks, of note only mild elevation in patient's bilirubin.  CMP performed in the ED showed elevated liver enzymes, ALT of 233, AST of 263 and alk-phos elevated to 1113, GGT was also elevated, as noted above patient's bilirubin on arrival was 2.3.  Lipase and WBC were normal.    CT performed in the ED showed a pancreatic head mass measuring 2.2 cm with moderate dilation of the biliary ductal system as well as severe dilation of the pancreatic duct with strong suspicion for obstruction.  There was also noted possible regional enlarged lymph nodes in the periportal region, bones were grossly intact with no abdominal sclerosis or lucency to indicate metastatic disease.    GI is being consulted for pancreatic mass with possible need of ERCP.    She endorses loss of appetite, itching, weight loss and change in stool. Stool changes were light, then dark at times.   She denies abdominal pain, change in bowel habits, constipation, nausea, vomiting, diarrhea, reflux symptoms, difficulty in swallowing, pain with swallowing, jaundice, melena, hematemesis, hematochezia, night sweats, and fever.    Last colonoscopy 3 years ago, cant remember where, was told it was normal.    Overnight Events:  Please see above    Review of Systems:  Pertinent positives and negatives  "listed in HPI. All other systems are reviewed and are negative.    Past Medical History: See above    Past Surgical History:  She  has a past surgical history that includes Colonoscopy.    Allergies:  Review of patient's allergies indicates:  No Known Allergies    Family History:  Her family history includes Cancer in her mother; Leukemia in her father.    Social History:  She  reports that she has quit smoking. Her smoking use included cigarettes. She has never used smokeless tobacco. She reports that she does not drink alcohol and does not use drugs.  Quit 30 years ago, approx 25 pyh    Home Medications:  She has a current medication list which includes the following prescription(s): ergocalciferol, gabapentin, lisinopril-hydrochlorothiazide, metformin, mupirocin, potassium chloride, simvastatin, and triamcinolone acetonide 0.025%, and the following Facility-Administered Medications: dextrose 10%, dextrose 10%, enoxaparin, glucagon (human recombinant), glucose, glucose, hydrocortisone, hydroxyzine pamoate, insulin aspart u-100, naloxone, ondansetron, and sodium chloride 0.9%.        Objective:   Vitals  Vitals  BP: 118/75  Temp: 98.6 °F (37 °C)  Temp Source: Oral  Pulse: 74  Resp: 17  SpO2: 97 %  Height: 5' 1" (154.9 cm)  Weight: 75 kg (165 lb 5.5 oz)    Physical Examination:  General: Awake, alert, & oriented to person, place & time. No acute distress  Psychiatric: Mood and affect normal  HEENT: Normocephalic, atraumatic. Face symmetric.  Cardiovascular: Regular rate & rhythm.  Normal S1 and S2 without murmur  Pulmonary: Bilateral symmetric chest rise, Non-labored.  No wheezes, rhonchi or crackles are noted  Abdominal:  nondistended, nontender to palpation, soft  Extremities: No clubbing or cyanosis.  Skin:  Exposed skin is warm & dry.  Neuro:   Patient moves all extremities equally. Sensation intact bilateraly.    Laboratory:  All pertinent labs within the past 24 hours have been reviewed.    Radiology:  I have " reviewed all pertinent imaging results/findings within the past 24 hours.    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

## 2023-09-12 NOTE — SUBJECTIVE & OBJECTIVE
Past Medical History:   Diagnosis Date    Arthritis     Hyperlipidemia     Hypertension     Osteoporosis        Past Surgical History:   Procedure Laterality Date    COLONOSCOPY         Review of patient's allergies indicates:  No Known Allergies  Family History       Problem Relation (Age of Onset)    Cancer Mother    Leukemia Father          Tobacco Use    Smoking status: Former     Types: Cigarettes    Smokeless tobacco: Never    Tobacco comments:     Quit over 25 years ago.    Substance and Sexual Activity    Alcohol use: Never    Drug use: Never    Sexual activity: Not Currently     Review of Systems   Unable to perform ROS: Age     Objective:     Vital Signs (Most Recent):  Temp: 98.6 °F (37 °C) (09/12/23 0021)  Pulse: 74 (09/12/23 0853)  Resp: 17 (09/12/23 0451)  BP: 118/75 (09/12/23 0853)  SpO2: 97 % (09/12/23 0853) Vital Signs (24h Range):  Temp:  [97.7 °F (36.5 °C)-98.6 °F (37 °C)] 98.6 °F (37 °C)  Pulse:  [71-87] 74  Resp:  [17-20] 17  SpO2:  [97 %-98 %] 97 %  BP: (118-153)/(75-87) 118/75     Weight: 75 kg (165 lb 5.5 oz) (09/11/23 1713)  Body mass index is 31.24 kg/m².    No intake or output data in the 24 hours ending 09/12/23 1045    Lines/Drains/Airways       Peripheral Intravenous Line  Duration                  Peripheral IV - Single Lumen 09/12/23 1002 20 G Right Antecubital <1 day                     Physical Exam  Vitals and nursing note reviewed.   Constitutional:       General: She is not in acute distress.     Appearance: She is not ill-appearing, toxic-appearing or diaphoretic.   HENT:      Head: Normocephalic and atraumatic.      Right Ear: External ear normal.      Left Ear: External ear normal.      Nose: Nose normal.      Mouth/Throat:      Lips: Pink.      Mouth: Mucous membranes are moist.   Eyes:      General:         Right eye: No discharge.         Left eye: No discharge.      Extraocular Movements: Extraocular movements intact.   Neck:      Vascular: No JVD.   Cardiovascular:       Rate and Rhythm: Normal rate and regular rhythm.   Pulmonary:      Effort: Pulmonary effort is normal. No respiratory distress.   Abdominal:      General: Abdomen is flat. There is no distension.      Palpations: Abdomen is soft.      Tenderness: There is no abdominal tenderness.   Musculoskeletal:      Cervical back: Neck supple.      Right lower leg: No edema.      Left lower leg: No edema.   Skin:     General: Skin is warm and dry.   Neurological:      Mental Status: She is alert and oriented to person, place, and time. Mental status is at baseline.   Psychiatric:         Mood and Affect: Mood normal.         Behavior: Behavior normal. Behavior is cooperative.          Significant Labs:  None    Significant Imaging:  Imaging results within the past 24 hours have been reviewed.

## 2023-09-12 NOTE — PT/OT/SLP PROGRESS
Pt. Was very high level and didn't require PT services. Also pt. Had assessment at home and doesn't need any equipment.

## 2023-09-12 NOTE — UM SECONDARY REVIEW
83-year-old female initially admitted on 09/11/2023 after being told by her nephrologist to be evaluated related to transaminitis.  Three days prior, total bilirubin 1.9, alk-phos 1183, , .  Previous history of hypertension, hyperlipidemia, CKD, diabetes mellitus.  Recent symptoms of diarrhea, urine discoloration, stool discoloration, weight loss, anorexia.  Comprehensive metabolic panel today: ALT - 233, AST - 263, ALP - 1113.  Hepatitis panel nonreactive, hyperbilirubinemia.  CT scan of the abdomen and pelvis demonstrated dilatation of the intra and extrahepatic biliary ducts, pancreatic duct with a subtle 2.4 x 2 cm relatively hypodense lesion on the head of the pancreas with ill defined margines; suspicious for pancreatic neoplasm.  Admitted for a GI consult.  Hypertensive in the 150s.  Intensity of services at the present time includes an NPO status, DVT prophylaxis, GI consultation for possible invasive intervention.  Continues with electrolyte monitoring, kidney function monitoring given the previously stated CKD.  Multiple other comorbidities including uncontrolled hypertension, advanced age.  At the present time, severity of illness and intensity of services may reach the threshold for inpatient level of care.         Nigel Gandhi MD  Utilization Management  Physician Advisor  [facility]  09/12/2023

## 2023-09-13 ENCOUNTER — ANESTHESIA EVENT (OUTPATIENT)
Dept: ENDOSCOPY | Facility: HOSPITAL | Age: 83
DRG: 435 | End: 2023-09-13
Payer: COMMERCIAL

## 2023-09-13 ENCOUNTER — ANESTHESIA (OUTPATIENT)
Dept: ENDOSCOPY | Facility: HOSPITAL | Age: 83
DRG: 435 | End: 2023-09-13
Payer: COMMERCIAL

## 2023-09-13 DIAGNOSIS — K86.89 PANCREATIC MASS: Primary | ICD-10-CM

## 2023-09-13 LAB
ALBUMIN SERPL-MCNC: 3.2 G/DL (ref 3.4–4.8)
ALBUMIN/GLOB SERPL: 0.7 RATIO (ref 1.1–2)
ALP SERPL-CCNC: 1119 UNIT/L (ref 40–150)
ALT SERPL-CCNC: 231 UNIT/L (ref 0–55)
AST SERPL-CCNC: 325 UNIT/L (ref 5–34)
BASOPHILS # BLD AUTO: 0.08 X10(3)/MCL
BASOPHILS NFR BLD AUTO: 0.8 %
BILIRUB SERPL-MCNC: 3.1 MG/DL
BUN SERPL-MCNC: 17.3 MG/DL (ref 9.8–20.1)
CALCIUM SERPL-MCNC: 9.5 MG/DL (ref 8.4–10.2)
CHLORIDE SERPL-SCNC: 103 MMOL/L (ref 98–107)
CO2 SERPL-SCNC: 25 MMOL/L (ref 23–31)
CREAT SERPL-MCNC: 0.75 MG/DL (ref 0.55–1.02)
EOSINOPHIL # BLD AUTO: 0.68 X10(3)/MCL (ref 0–0.9)
EOSINOPHIL NFR BLD AUTO: 6.8 %
ERYTHROCYTE [DISTWIDTH] IN BLOOD BY AUTOMATED COUNT: 13.6 % (ref 11.5–17)
GFR SERPLBLD CREATININE-BSD FMLA CKD-EPI: >60 MLS/MIN/1.73/M2
GLOBULIN SER-MCNC: 4.3 GM/DL (ref 2.4–3.5)
GLUCOSE SERPL-MCNC: 140 MG/DL (ref 82–115)
HCT VFR BLD AUTO: 40.6 % (ref 37–47)
HGB BLD-MCNC: 13.3 G/DL (ref 12–16)
IMM GRANULOCYTES # BLD AUTO: 0.03 X10(3)/MCL (ref 0–0.04)
IMM GRANULOCYTES NFR BLD AUTO: 0.3 %
LYMPHOCYTES # BLD AUTO: 2.67 X10(3)/MCL (ref 0.6–4.6)
LYMPHOCYTES NFR BLD AUTO: 26.8 %
MAGNESIUM SERPL-MCNC: 2.1 MG/DL (ref 1.6–2.6)
MCH RBC QN AUTO: 29.2 PG (ref 27–31)
MCHC RBC AUTO-ENTMCNC: 32.8 G/DL (ref 33–36)
MCV RBC AUTO: 89.2 FL (ref 80–94)
MONOCYTES # BLD AUTO: 0.95 X10(3)/MCL (ref 0.1–1.3)
MONOCYTES NFR BLD AUTO: 9.5 %
NEUTROPHILS # BLD AUTO: 5.55 X10(3)/MCL (ref 2.1–9.2)
NEUTROPHILS NFR BLD AUTO: 55.8 %
NRBC BLD AUTO-RTO: 0 %
PATH REV: NORMAL
PHOSPHATE SERPL-MCNC: 3.3 MG/DL (ref 2.3–4.7)
PLATELET # BLD AUTO: 330 X10(3)/MCL (ref 130–400)
PMV BLD AUTO: 11.2 FL (ref 7.4–10.4)
POCT GLUCOSE: 132 MG/DL (ref 70–110)
POCT GLUCOSE: 137 MG/DL (ref 70–110)
POCT GLUCOSE: 153 MG/DL (ref 70–110)
POCT GLUCOSE: 158 MG/DL (ref 70–110)
POTASSIUM SERPL-SCNC: 3.4 MMOL/L (ref 3.5–5.1)
PROT SERPL-MCNC: 7.5 GM/DL (ref 5.8–7.6)
RBC # BLD AUTO: 4.55 X10(6)/MCL (ref 4.2–5.4)
SODIUM SERPL-SCNC: 140 MMOL/L (ref 136–145)
WBC # SPEC AUTO: 9.96 X10(3)/MCL (ref 4.5–11.5)

## 2023-09-13 PROCEDURE — D9220A PRA ANESTHESIA: Mod: CRNA,,, | Performed by: NURSE ANESTHETIST, CERTIFIED REGISTERED

## 2023-09-13 PROCEDURE — 63600175 PHARM REV CODE 636 W HCPCS: Performed by: NURSE ANESTHETIST, CERTIFIED REGISTERED

## 2023-09-13 PROCEDURE — 63600175 PHARM REV CODE 636 W HCPCS: Mod: JZ,JG

## 2023-09-13 PROCEDURE — 43274 ERCP DUCT STENT PLACEMENT: CPT | Performed by: INTERNAL MEDICINE

## 2023-09-13 PROCEDURE — 63600175 PHARM REV CODE 636 W HCPCS: Performed by: ANESTHESIOLOGY

## 2023-09-13 PROCEDURE — D9220A PRA ANESTHESIA: ICD-10-PCS | Mod: CRNA,,, | Performed by: NURSE ANESTHETIST, CERTIFIED REGISTERED

## 2023-09-13 PROCEDURE — 27201423 OPTIME MED/SURG SUP & DEVICES STERILE SUPPLY: Performed by: INTERNAL MEDICINE

## 2023-09-13 PROCEDURE — 85025 COMPLETE CBC W/AUTO DIFF WBC: CPT

## 2023-09-13 PROCEDURE — 11000001 HC ACUTE MED/SURG PRIVATE ROOM

## 2023-09-13 PROCEDURE — 25000003 PHARM REV CODE 250

## 2023-09-13 PROCEDURE — D9220A PRA ANESTHESIA: Mod: ANES,,, | Performed by: ANESTHESIOLOGY

## 2023-09-13 PROCEDURE — 63600175 PHARM REV CODE 636 W HCPCS

## 2023-09-13 PROCEDURE — 83735 ASSAY OF MAGNESIUM: CPT

## 2023-09-13 PROCEDURE — 84100 ASSAY OF PHOSPHORUS: CPT

## 2023-09-13 PROCEDURE — C1874 STENT, COATED/COV W/DEL SYS: HCPCS | Performed by: INTERNAL MEDICINE

## 2023-09-13 PROCEDURE — 80053 COMPREHEN METABOLIC PANEL: CPT

## 2023-09-13 PROCEDURE — 37000009 HC ANESTHESIA EA ADD 15 MINS: Performed by: INTERNAL MEDICINE

## 2023-09-13 PROCEDURE — 25000003 PHARM REV CODE 250: Performed by: INTERNAL MEDICINE

## 2023-09-13 PROCEDURE — 37000008 HC ANESTHESIA 1ST 15 MINUTES: Performed by: INTERNAL MEDICINE

## 2023-09-13 PROCEDURE — D9220A PRA ANESTHESIA: ICD-10-PCS | Mod: ANES,,, | Performed by: ANESTHESIOLOGY

## 2023-09-13 DEVICE — STENT WALLFLEX BIL RMV 10X4: Type: IMPLANTABLE DEVICE | Site: BILE DUCT | Status: FUNCTIONAL

## 2023-09-13 RX ORDER — SODIUM CHLORIDE 9 MG/ML
INJECTION, SOLUTION INTRAVENOUS CONTINUOUS
Status: DISCONTINUED | OUTPATIENT
Start: 2023-09-13 | End: 2023-09-14 | Stop reason: HOSPADM

## 2023-09-13 RX ORDER — KETOROLAC TROMETHAMINE 30 MG/ML
INJECTION, SOLUTION INTRAMUSCULAR; INTRAVENOUS
Status: DISCONTINUED | OUTPATIENT
Start: 2023-09-13 | End: 2023-09-13

## 2023-09-13 RX ORDER — INDOMETHACIN 50 MG/1
100 SUPPOSITORY RECTAL
Status: DISCONTINUED | OUTPATIENT
Start: 2023-09-13 | End: 2023-09-14 | Stop reason: HOSPADM

## 2023-09-13 RX ORDER — SUCCINYLCHOLINE CHLORIDE 20 MG/ML
INJECTION INTRAMUSCULAR; INTRAVENOUS
Status: DISCONTINUED | OUTPATIENT
Start: 2023-09-13 | End: 2023-09-13

## 2023-09-13 RX ORDER — LIDOCAINE HYDROCHLORIDE 10 MG/ML
1 INJECTION, SOLUTION EPIDURAL; INFILTRATION; INTRACAUDAL; PERINEURAL ONCE
Status: CANCELLED | OUTPATIENT
Start: 2023-09-13 | End: 2023-09-13

## 2023-09-13 RX ORDER — SODIUM CHLORIDE, SODIUM LACTATE, POTASSIUM CHLORIDE, CALCIUM CHLORIDE 600; 310; 30; 20 MG/100ML; MG/100ML; MG/100ML; MG/100ML
INJECTION, SOLUTION INTRAVENOUS CONTINUOUS
Status: DISCONTINUED | OUTPATIENT
Start: 2023-09-13 | End: 2023-09-14 | Stop reason: HOSPADM

## 2023-09-13 RX ORDER — PROPOFOL 10 MG/ML
VIAL (ML) INTRAVENOUS
Status: DISCONTINUED | OUTPATIENT
Start: 2023-09-13 | End: 2023-09-13

## 2023-09-13 RX ORDER — FENTANYL CITRATE 50 UG/ML
INJECTION, SOLUTION INTRAMUSCULAR; INTRAVENOUS
Status: DISCONTINUED | OUTPATIENT
Start: 2023-09-13 | End: 2023-09-13

## 2023-09-13 RX ORDER — PHENYLEPHRINE HYDROCHLORIDE 10 MG/ML
INJECTION INTRAVENOUS
Status: DISCONTINUED | OUTPATIENT
Start: 2023-09-13 | End: 2023-09-13

## 2023-09-13 RX ORDER — DEXAMETHASONE SODIUM PHOSPHATE 4 MG/ML
INJECTION, SOLUTION INTRA-ARTICULAR; INTRALESIONAL; INTRAMUSCULAR; INTRAVENOUS; SOFT TISSUE
Status: DISCONTINUED | OUTPATIENT
Start: 2023-09-13 | End: 2023-09-13

## 2023-09-13 RX ORDER — ONDANSETRON 2 MG/ML
4 INJECTION INTRAMUSCULAR; INTRAVENOUS ONCE AS NEEDED
Status: DISCONTINUED | OUTPATIENT
Start: 2023-09-13 | End: 2023-09-13 | Stop reason: HOSPADM

## 2023-09-13 RX ADMIN — ENOXAPARIN SODIUM 40 MG: 40 INJECTION SUBCUTANEOUS at 05:09

## 2023-09-13 RX ADMIN — PHENYLEPHRINE HYDROCHLORIDE 100 MCG: 10 INJECTION INTRAVENOUS at 02:09

## 2023-09-13 RX ADMIN — PROPOFOL 150 MG: 10 INJECTION, EMULSION INTRAVENOUS at 02:09

## 2023-09-13 RX ADMIN — DEXAMETHASONE SODIUM PHOSPHATE 8 MG: 4 INJECTION, SOLUTION INTRA-ARTICULAR; INTRALESIONAL; INTRAMUSCULAR; INTRAVENOUS; SOFT TISSUE at 02:09

## 2023-09-13 RX ADMIN — SUCCINYLCHOLINE CHLORIDE 100 MG: 20 INJECTION, SOLUTION INTRAMUSCULAR; INTRAVENOUS at 02:09

## 2023-09-13 RX ADMIN — ONDANSETRON 4 MG: 2 INJECTION INTRAMUSCULAR; INTRAVENOUS at 02:09

## 2023-09-13 RX ADMIN — KETOROLAC TROMETHAMINE 30 MG: 30 INJECTION INTRAMUSCULAR; INTRAVENOUS at 02:09

## 2023-09-13 RX ADMIN — FENTANYL CITRATE 100 MCG: 50 INJECTION INTRAMUSCULAR; INTRAVENOUS at 02:09

## 2023-09-13 RX ADMIN — SODIUM CHLORIDE: 9 INJECTION, SOLUTION INTRAVENOUS at 11:09

## 2023-09-13 RX ADMIN — SODIUM CHLORIDE, POTASSIUM CHLORIDE, SODIUM LACTATE AND CALCIUM CHLORIDE: 600; 310; 30; 20 INJECTION, SOLUTION INTRAVENOUS at 04:09

## 2023-09-13 RX ADMIN — SODIUM CHLORIDE, POTASSIUM CHLORIDE, SODIUM LACTATE AND CALCIUM CHLORIDE: 600; 310; 30; 20 INJECTION, SOLUTION INTRAVENOUS at 01:09

## 2023-09-13 NOTE — ANESTHESIA PREPROCEDURE EVALUATION
2023  Darby Gary is a 83 y.o., female pancreatic Mass. Acholic stools and tea-colored urine x >6 wks. Wt loss unintended.    Pre-operative evaluation for Procedure(s) (LRB):  ERCP (ENDOSCOPIC RETROGRADE CHOLANGIOPANCREATOGRAPHY) (N/A)    Darby Gary is a 83 y.o. female   GI MD Notes 2023:   Patient with obstructive jaundice, weight loss, and double duct sign on imaging concerning for pancreatic head neoplasm.  CT reviewed personally.  Alk phos > 1000 with elevated TB and bile duct dilation, she needs ERCP for stent placement which we will arrange tomorrow.  Elevated CA 19-9, will need to be repeated once obstruction is relieved.  EUS recommended for biopsy of pancreatic head mass.      Patient Active Problem List   Diagnosis    Family history of malignant neoplasm of colon    Hyperlipidemia    Hypertension    Obesity    Type 2 diabetes mellitus    Pancreatic mass    Moderate malnutrition       Review of patient's allergies indicates:  No Known Allergies    No current facility-administered medications on file prior to encounter.     Current Outpatient Medications on File Prior to Encounter   Medication Sig Dispense Refill    ergocalciferol (ERGOCALCIFEROL) 50,000 unit Cap Take 50,000 Units by mouth every 7 days.      gabapentin (NEURONTIN) 300 MG capsule Take 300-600 mg by mouth every evening.      lisinopriL-hydrochlorothiazide (PRINZIDE,ZESTORETIC) 10-12.5 mg per tablet       metFORMIN (GLUCOPHAGE) 500 MG tablet Take 500 mg by mouth once daily.      mupirocin (BACTROBAN) 2 % ointment mupirocin 2 % topical ointment      potassium chloride (KLOR-CON) 10 MEQ TbSR Take 10 mEq by mouth once daily.      simvastatin (ZOCOR) 40 MG tablet SMARTSI Tablet(s) By Mouth Every Evening      triamcinolone acetonide 0.025% (KENALOG) 0.025 % cream triamcinolone acetonide 0.025 % topical  cream   APPLY TO THE AFFECTED AREA(S) ONCE a DAY         Past Surgical History:   Procedure Laterality Date    COLONOSCOPY         CBC:   Recent Labs     09/12/23 0416 09/13/23 0412   WBC 10.48 9.96   RBC 4.48 4.55   HGB 12.8 13.3   HCT 39.5 40.6    330   MCV 88.2 89.2   MCH 28.6 29.2   MCHC 32.4* 32.8*       CMP:   Recent Labs     09/12/23 0416 09/13/23 0412    140   K 3.5 3.4*   CO2 27 25   BUN 17.1 17.3   CREATININE 0.74 0.75   MG 2.00 2.10   PHOS 3.2 3.3   CALCIUM 9.4 9.5   ALBUMIN 3.1* 3.2*   ALKPHOS 1,021* 1,119*   * 231*   * 325*   BILITOT 2.1* 3.1*       INR  Recent Labs     09/11/23 2347   INR 1.1   PROTIME 13.9           Diagnostic Studies:  CXR : None recently.    EKG   12/2/2022: FR=648    2D Echo :  No results found for this or any previous visit..      Pre-op Assessment    I have reviewed the Patient Summary Reports.     I have reviewed the Nursing Notes. I have reviewed the NPO Status.   I have reviewed the Medications.     Review of Systems  Anesthesia Hx:  History of prior surgery of interest to airway management or planning: Previous anesthesia: MAC, General C/S x3 with general anesthesia.   Colonoscopy; Phaco B; with MAC.  Denies Family Hx of Anesthesia complications.   Denies Personal Hx of Anesthesia complications.   Social:  Non-Smoker, No Alcohol Use    Hematology/Oncology:  Hematology Normal   Oncology Normal     EENT/Dental:EENT/Dental Normal   Cardiovascular:   Hypertension hyperlipidemia Carotid US 8/23: B  ICA=1-39%.   Pulmonary:  Pulmonary Normal    Renal/:  Renal/ Normal     Hepatic/GI:  Hepatic/GI Normal    Musculoskeletal:  Musculoskeletal Normal    Neurological:  Neurology Normal    Endocrine:   Diabetes, using insulin  Obesity / BMI > 30  Dermatological:  Skin Normal    Psych:  Psychiatric Normal           Physical Exam  General: Cooperative, Alert, Oriented and Jaundice    Airway:  Mallampati: II / I  Mouth Opening: Normal  TM Distance:  Normal  Tongue: Normal  Neck ROM: Normal ROM    Dental:  Intact  Px denies any loose teeth.  Chest/Lungs:  Normal Respiratory Rate    Heart:  Rate: Tachycardia  Rhythm: Regular Rhythm        Anesthesia Plan  Type of Anesthesia, risks & benefits discussed:    Anesthesia Type: Gen ETT  Intra-op Monitoring Plan: Standard ASA Monitors  Post Op Pain Control Plan: multimodal analgesia  Induction:  IV  Airway Plan: Direct, Post-Induction  Informed Consent: Informed consent signed with the Patient and all parties understand the risks and agree with anesthesia plan.  All questions answered.   ASA Score: 3  Day of Surgery Review of History & Physical: H&P Update referred to the surgeon/provider.I have interviewed and examined the patient. I have reviewed the patient's H&P dated:     Ready For Surgery From Anesthesia Perspective.     .

## 2023-09-13 NOTE — ANESTHESIA POSTPROCEDURE EVALUATION
Anesthesia Post Evaluation    Patient: Darby Gary    Procedure(s) Performed: Procedure(s) (LRB):  ERCP (ENDOSCOPIC RETROGRADE CHOLANGIOPANCREATOGRAPHY)/ cytology brushing (N/A)  EGD (ESOPHAGOGASTRODUODENOSCOPY) (N/A)  ERCP, WITH SPHINCTEROTOMY  ERCP, WITH STENT INSERTION    Final Anesthesia Type: general      Patient location during evaluation: PACU  Patient participation: Yes- Able to Participate  Level of consciousness: awake and alert, awake and oriented  Post-procedure vital signs: reviewed and stable  Pain management: adequate  Airway patency: patent    PONV status at discharge: No PONV  Anesthetic complications: no      Cardiovascular status: blood pressure returned to baseline, hemodynamically stable and stable  Respiratory status: unassisted, spontaneous ventilation and room air  Hydration status: euvolemic  Follow-up not needed.          Vitals Value Taken Time   /76 09/13/23 1638   Temp 36.4 °C (97.5 °F) 09/13/23 1638   Pulse 84 09/13/23 1638   Resp 16 09/13/23 1638   SpO2 98 % 09/13/23 1638         Event Time   Out of Recovery 09/13/2023 16:28:00         Pain/Hoda Score: Hoda Score: 9 (9/13/2023  4:17 PM)

## 2023-09-13 NOTE — PROGRESS NOTES
[facility] Progress Note     Patient Name: Darby Gary  MRN: [id number]  Admission Date: 9/11/2023  Current Hospital Day: 3   Code Status: Full Code  Attending Provider: Yessenia Clark MD    Reason for Consult:      Pancreatic mass    Subjective:     History of Present Illness:  Darby Gary is a 83 y.o. female with PMH of hypertension, hyperlipidemia, type 2 diabetes who was sent to the ED by her nephrologist due to elevated liver enzymes on her recent blood work.  Patient did report 2-3 months history of unintentional weight loss (30 lbs total), pale stools and dark colored urine.  She also endorsed increased pruritus in her abdomen as well as her back over the last 3 weeks, of note only mild elevation in patient's bilirubin.  CMP performed in the ED showed elevated liver enzymes, ALT of 233, AST of 263 and alk-phos elevated to 1113, GGT was also elevated, as noted above patient's bilirubin on arrival was 2.3.  Lipase and WBC were normal.    CT performed in the ED showed a pancreatic head mass measuring 2.2 cm with moderate dilation of the biliary ductal system as well as severe dilation of the pancreatic duct with strong suspicion for obstruction.  There was also noted possible regional enlarged lymph nodes in the periportal region, bones were grossly intact with no abdominal sclerosis or lucency to indicate metastatic disease.    GI is being consulted for pancreatic mass with possible need of ERCP.    She endorses loss of appetite, itching, weight loss and change in stool. Stool changes were light, then dark at times.   She denies abdominal pain, change in bowel habits, constipation, nausea, vomiting, diarrhea, reflux symptoms, difficulty in swallowing, pain with swallowing, jaundice, melena, hematemesis, hematochezia, night sweats, and fever.    Last colonoscopy 3 years ago, cant remember where, was told it was normal.    Overnight Events:  NAEO, slight elevation in bilirubin 2.1-3.1 overnight, AST and ALT  "continued to rise.  Plans for ERCP with stent placement this afternoon. Pt complains of dry mouth and hunger this morning    Review of Systems:  Pertinent positives and negatives listed in HPI. All other systems are reviewed and are negative.    Past Medical History: See above    Past Surgical History:  She  has a past surgical history that includes Colonoscopy.    Allergies:  Review of patient's allergies indicates:  No Known Allergies    Family History:  Her family history includes Cancer in her mother; Leukemia in her father.    Social History:  She  reports that she has quit smoking. Her smoking use included cigarettes. She has never used smokeless tobacco. She reports that she does not drink alcohol and does not use drugs.  Quit 30 years ago, approx 25 pyh    Home Medications:  She has a current medication list which includes the following prescription(s): ergocalciferol, gabapentin, lisinopril-hydrochlorothiazide, metformin, mupirocin, potassium chloride, simvastatin, and triamcinolone acetonide 0.025%, and the following Facility-Administered Medications: dextrose 10%, dextrose 10%, enoxaparin, glucagon (human recombinant), glucose, glucose, hydrocortisone, hydroxyzine pamoate, indomethacin, insulin aspart u-100, naloxone, ondansetron, and sodium chloride 0.9%.        Objective:   Vitals  Vitals  BP: (!) 145/69  Temp: 98.4 °F (36.9 °C)  Temp Source: Oral  Pulse: 77  Resp: 17  SpO2: 97 %  Height: 5' 1" (154.9 cm)  Weight: 75 kg (165 lb 5.5 oz)    Physical Examination:  General: Awake, alert, & oriented to person, place & time. No acute distress  Psychiatric: Mood and affect normal  HEENT: Normocephalic, atraumatic. Face symmetric. Dry mucus membranes  Cardiovascular: Regular rate & rhythm  Pulmonary: Bilateral symmetric chest rise, Non-labored  Abdominal:  nondistended, non-tender  Extremities: No clubbing or cyanosis.  Skin:  Exposed skin is warm & dry.  Neuro:   Patient moves all extremities equally. " Sensation intact bilateraly.      Laboratory:  All pertinent labs within the past 24 hours have been reviewed.    Radiology:  I have reviewed all pertinent imaging results/findings within the past 24 hours.    Assessment & Plan:     Pancreatic head mass with obstruction  -plans for ERCP and stent placement this afternoon  -have notified primary team to initiate IVFs  -consents to be obtained prior to procedure  -NPO until procedure is performed  -will need outpatient EUS for biopsy  -Rest per primary team      Franklin Nieto MD  Internal Medicine - PGY-3

## 2023-09-13 NOTE — PLAN OF CARE
09/13/23 1341   Discharge Assessment   Assessment Type Discharge Planning Assessment   Confirmed/corrected address, phone number and insurance Yes   Confirmed Demographics Correct on Facesheet   Source of Information patient   When was your last doctors appointment?   (PCP: at [facility])   Does patient/caregiver understand observation status   (Inpatient)   Communicated TOÑITO with patient/caregiver Date not available/Unable to determine   Reason For Admission Jaundice; Pancreatic neoplasm; CP   People in Home grandchild(laina)   Facility Arrived From: Home   Do you expect to return to your current living situation? Yes   Do you have help at home or someone to help you manage your care at home? Yes   Who are your caregiver(s) and their phone number(s)? Brandon Gary, grandson; Giovany, son, P: [phone]   Prior to hospitilization cognitive status: Alert/Oriented;No Deficits   Current cognitive status: Alert/Oriented;No Deficits   Home Accessibility wheelchair accessible   Home Layout Able to live on 1st floor   Equipment Currently Used at Home none   Readmission within 30 days? No   Patient currently being followed by outpatient case management? No   Do you currently have service(s) that help you manage your care at home? No   Do you take prescription medications? Yes  ([facility])   Do you have prescription coverage? Yes   Coverage [employer]   Do you have any problems affording any of your prescribed medications? No   Is the patient taking medications as prescribed? yes   Who is going to help you get home at discharge? Family   How do you get to doctors appointments? car, drives self;family or friend will provide   Are you on dialysis? No   Do you take coumadin? No   DME Needed Upon Discharge  none   Discharge Plan discussed with: Patient;Adult children   Transition of Care Barriers None   Discharge Plan A Home with family   Physical Activity   On average, how many days per week do you engage in moderate to strenuous exercise  (like a brisk walk)? 0 days   On average, how many minutes do you engage in exercise at this level? 0 min   Financial Resource Strain   How hard is it for you to pay for the very basics like food, housing, medical care, and heating? Hard   Housing Stability   In the last 12 months, was there a time when you were not able to pay the mortgage or rent on time? N   In the last 12 months, how many places have you lived? 1   In the last 12 months, was there a time when you did not have a steady place to sleep or slept in a shelter (including now)? N   Transportation Needs   In the past 12 months, has lack of transportation kept you from medical appointments or from getting medications? no   In the past 12 months, has lack of transportation kept you from meetings, work, or from getting things needed for daily living? No   Food Insecurity   Within the past 12 months, you worried that your food would run out before you got the money to buy more. Sometimes   Within the past 12 months, the food you bought just didn't last and you didn't have money to get more. Sometimes   Stress   Do you feel stress - tense, restless, nervous, or anxious, or unable to sleep at night because your mind is troubled all the time - these days? Only a littl   Social Connections   In a typical week, how many times do you talk on the phone with family, friends, or neighbors? More than 3   How often do you get together with friends or relatives? More than 3   How often do you attend Rastafari or Sikh services? More than 4   Do you belong to any clubs or organizations such as Rastafari groups, unions, fraternal or athletic groups, or school groups? No   How often do you attend meetings of the clubs or organizations you belong to? Never   Are you , , , , never , or living with a partner?    Alcohol Use   Q1: How often do you have a drink containing alcohol? Never   Q2: How many drinks containing alcohol do you  have on a typical day when you are drinking? None   Q3: How often do you have six or more drinks on one occasion? Never   OTHER   Name(s) of People in Home grand Rameznelson     Patient receives SSI but no SNAP benefits. [place] referral submitted via [facility] for general resource assistance based on SDOH with patient's consent.

## 2023-09-13 NOTE — ANESTHESIA PROCEDURE NOTES
Intubation    Date/Time: 9/13/2023 2:48 PM    Performed by: Real Marsh CRNA  Authorized by: Shalonda Cervantes MD    Intubation:     Induction:  Intravenous    Intubated:  Postinduction    Mask Ventilation:  Easy mask    Attempts:  1    Attempted By:  CRNA    Method of Intubation:  Direct    Blade:  Norbert 4    Laryngeal View Grade: Grade I - full view of cords      Difficult Airway Encountered?: No      Complications:  None    Airway Device:  Oral endotracheal tube    Airway Device Size:  7.5    Style/Cuff Inflation:  Cuffed    Inflation Amount (mL):  4    Tube secured:  21    Secured at:  The lips    Placement Verified By:  Capnometry    Complicating Factors:  None    Findings Post-Intubation:  BS equal bilateral

## 2023-09-13 NOTE — TRANSFER OF CARE
"Anesthesia Transfer of Care Note    Patient: Darby Gary    Procedure(s) Performed: Procedure(s) (LRB):  ERCP (ENDOSCOPIC RETROGRADE CHOLANGIOPANCREATOGRAPHY) (N/A)    Patient location: PACU    Anesthesia Type: general    Transport from OR: Transported from OR on room air with adequate spontaneous ventilation    Post pain: adequate analgesia    Post assessment: no apparent anesthetic complications    Post vital signs: stable    Level of consciousness: awake    Nausea/Vomiting: no nausea/vomiting    Complications: none    Transfer of care protocol was followed      Last vitals:   Visit Vitals  /75   Pulse 71   Temp 36.8 °C (98.2 °F) (Oral)   Resp 18   Ht 5' 1" (1.549 m)   Wt 75 kg (165 lb 5.5 oz)   SpO2 98%   BMI 31.24 kg/m²     "

## 2023-09-14 VITALS
HEART RATE: 85 BPM | HEIGHT: 61 IN | SYSTOLIC BLOOD PRESSURE: 130 MMHG | RESPIRATION RATE: 18 BRPM | BODY MASS INDEX: 31.23 KG/M2 | WEIGHT: 165.38 LBS | TEMPERATURE: 98 F | OXYGEN SATURATION: 97 % | DIASTOLIC BLOOD PRESSURE: 79 MMHG

## 2023-09-14 LAB
ALBUMIN SERPL-MCNC: 3.1 G/DL (ref 3.4–4.8)
ALBUMIN/GLOB SERPL: 0.7 RATIO (ref 1.1–2)
ALP SERPL-CCNC: 965 UNIT/L (ref 40–150)
ALT SERPL-CCNC: 192 UNIT/L (ref 0–55)
AST SERPL-CCNC: 133 UNIT/L (ref 5–34)
BASOPHILS # BLD AUTO: 0.02 X10(3)/MCL
BASOPHILS NFR BLD AUTO: 0.1 %
BILIRUB SERPL-MCNC: 1.6 MG/DL
BUN SERPL-MCNC: 23.3 MG/DL (ref 9.8–20.1)
CALCIUM SERPL-MCNC: 10 MG/DL (ref 8.4–10.2)
CHLORIDE SERPL-SCNC: 102 MMOL/L (ref 98–107)
CO2 SERPL-SCNC: 27 MMOL/L (ref 23–31)
CREAT SERPL-MCNC: 0.84 MG/DL (ref 0.55–1.02)
EOSINOPHIL # BLD AUTO: 0.03 X10(3)/MCL (ref 0–0.9)
EOSINOPHIL NFR BLD AUTO: 0.2 %
ERYTHROCYTE [DISTWIDTH] IN BLOOD BY AUTOMATED COUNT: 13.2 % (ref 11.5–17)
GFR SERPLBLD CREATININE-BSD FMLA CKD-EPI: >60 MLS/MIN/1.73/M2
GLOBULIN SER-MCNC: 4.5 GM/DL (ref 2.4–3.5)
GLUCOSE SERPL-MCNC: 155 MG/DL (ref 82–115)
HCT VFR BLD AUTO: 43.6 % (ref 37–47)
HGB BLD-MCNC: 14 G/DL (ref 12–16)
IMM GRANULOCYTES # BLD AUTO: 0.06 X10(3)/MCL (ref 0–0.04)
IMM GRANULOCYTES NFR BLD AUTO: 0.4 %
LYMPHOCYTES # BLD AUTO: 2.26 X10(3)/MCL (ref 0.6–4.6)
LYMPHOCYTES NFR BLD AUTO: 16.9 %
MAGNESIUM SERPL-MCNC: 2.1 MG/DL (ref 1.6–2.6)
MCH RBC QN AUTO: 28.5 PG (ref 27–31)
MCHC RBC AUTO-ENTMCNC: 32.1 G/DL (ref 33–36)
MCV RBC AUTO: 88.8 FL (ref 80–94)
MONOCYTES # BLD AUTO: 1 X10(3)/MCL (ref 0.1–1.3)
MONOCYTES NFR BLD AUTO: 7.5 %
NEUTROPHILS # BLD AUTO: 10.03 X10(3)/MCL (ref 2.1–9.2)
NEUTROPHILS NFR BLD AUTO: 74.9 %
NRBC BLD AUTO-RTO: 0 %
PHOSPHATE SERPL-MCNC: 2.9 MG/DL (ref 2.3–4.7)
PLATELET # BLD AUTO: 258 X10(3)/MCL (ref 130–400)
PMV BLD AUTO: 12.2 FL (ref 7.4–10.4)
POTASSIUM SERPL-SCNC: 5 MMOL/L (ref 3.5–5.1)
PROT SERPL-MCNC: 7.6 GM/DL (ref 5.8–7.6)
RBC # BLD AUTO: 4.91 X10(6)/MCL (ref 4.2–5.4)
SODIUM SERPL-SCNC: 138 MMOL/L (ref 136–145)
WBC # SPEC AUTO: 13.4 X10(3)/MCL (ref 4.5–11.5)

## 2023-09-14 PROCEDURE — 84100 ASSAY OF PHOSPHORUS: CPT

## 2023-09-14 PROCEDURE — 63600175 PHARM REV CODE 636 W HCPCS: Performed by: ANESTHESIOLOGY

## 2023-09-14 PROCEDURE — 80053 COMPREHEN METABOLIC PANEL: CPT

## 2023-09-14 PROCEDURE — 83735 ASSAY OF MAGNESIUM: CPT

## 2023-09-14 PROCEDURE — 85025 COMPLETE CBC W/AUTO DIFF WBC: CPT

## 2023-09-14 RX ORDER — ASPIRIN 81 MG/1
81 TABLET ORAL DAILY
Qty: 30 TABLET | Refills: 11 | Status: SHIPPED | OUTPATIENT
Start: 2023-09-14 | End: 2024-09-13

## 2023-09-14 RX ADMIN — SODIUM CHLORIDE, POTASSIUM CHLORIDE, SODIUM LACTATE AND CALCIUM CHLORIDE: 600; 310; 30; 20 INJECTION, SOLUTION INTRAVENOUS at 06:09

## 2023-09-14 NOTE — NURSING
Patient discharged to home or self care. Patient IV was removed and patient discharge instructions were given. Patient verbalized understanding of discharge instructions and patient was transported downstairs.

## 2023-09-14 NOTE — PROVATION PATIENT INSTRUCTIONS
Discharge Summary/Instructions after an Endoscopic Procedure  Patient Name: Darby Gary  Patient MRN: [id number]  Patient YOB: 1940 Wednesday, September 13, 2023  Erika Ulloa MD  Dear patient,  As a result of recent federal legislation (The Federal Cures Act), you may   receive lab or pathology results from your procedure in your [facility]   account before your physician is able to contact you. Your physician or   their representative will relay the results to you with their   recommendations at their soonest availability.  Thank you,  RESTRICTIONS:  During your procedure today, you received medications for sedation.  These   medications may affect your judgment, balance and coordination.  Therefore,   for 24 hours, you have the following restrictions:   - DO NOT drive a car, operate machinery, make legal/financial decisions,   sign important papers or drink alcohol.    ACTIVITY:  Today: no heavy lifting, straining or running due to procedural   sedation/anesthesia.  The following day: return to full activity including work.  DIET:  Eat and drink normally unless instructed otherwise.     TREATMENT FOR COMMON SIDE EFFECTS:  - Mild abdominal pain, nausea, belching, bloating or excessive gas:  rest,   eat lightly and use a heating pad.  - Sore Throat: treat with throat lozenges and/or gargle with warm salt   water.  - Because air was used during the procedure, expelling large amounts of air   from your rectum or belching is normal.  - If a bowel prep was taken, you may not have a bowel movement for 1-3 days.    This is normal.  SYMPTOMS TO WATCH FOR AND REPORT TO YOUR PHYSICIAN:  1. Abdominal pain or bloating, other than gas cramps.  2. Chest pain.  3. Back pain.  4. Signs of infection such as: chills or fever occurring within 24 hours   after the procedure.  5. Rectal bleeding, which would show as bright red, maroon, or black stools.   (A tablespoon of blood from the rectum is not serious,  especially if   hemorrhoids are present.)  6. Vomiting.  7. Weakness or dizziness.  GO DIRECTLY TO THE NEAREST EMERGENCY ROOM IF YOU HAVE ANY OF THE FOLLOWING:      Difficulty breathing              Chills and/or fever over 101 F   Persistent vomiting and/or vomiting blood   Severe abdominal pain   Severe chest pain   Black, tarry stools   Bleeding- more than one tablespoon   Any other symptom or condition that you feel may need urgent attention  Your doctor recommends these additional instructions:  If any biopsies were taken, your doctors clinic will contact you in 1 to 2   weeks with any results.  - Return patient to hospital [facility] for ongoing care.   - Advance diet as tolerated today.   - Await cytology results.   - Perform an upper endoscopic ultrasound (UEUS) at appointment to be   scheduled.   - Continue present medications.   - Check liver enzymes (AST, ALT, alkaline phosphatase, bilirubin) in the   morning.  For questions, problems or results please call your physician - Erika Ulloa MD at Work:  [phone], Work:  [phone].  [facility] , EMERGENCY ROOM PHONE NUMBER: [phone]  IF A COMPLICATION OR EMERGENCY SITUATION ARISES AND YOU ARE UNABLE TO REACH   YOUR PHYSICIAN - GO DIRECTLY TO THE EMERGENCY ROOM.  Erika Ulloa MD  9/13/2023 6:59:06 PM  This report has been verified and signed electronically.  Dear patient,  As a result of recent federal legislation (The Federal Cures Act), you may   receive lab or pathology results from your procedure in your [facility]   account before your physician is able to contact you. Your physician or   their representative will relay the results to you with their   recommendations at their soonest availability.  Thank you,  PROVATION

## 2023-09-14 NOTE — PROGRESS NOTES
[facility] Progress Note     Patient Name: Darby Gary  MRN: [id number]  Admission Date: 9/11/2023  Current Hospital Day: 4   Code Status: Full Code  Attending Provider: Yessenia Clark MD    Reason for Consult:      Pancreatic mass    Subjective:     History of Present Illness:  Darby Gary is a 83 y.o. female with PMH of hypertension, hyperlipidemia, type 2 diabetes who was sent to the ED by her nephrologist due to elevated liver enzymes on her recent blood work.  Patient did report 2-3 months history of unintentional weight loss (30 lbs total), pale stools and dark colored urine.  She also endorsed increased pruritus in her abdomen as well as her back over the last 3 weeks, of note only mild elevation in patient's bilirubin.  CMP performed in the ED showed elevated liver enzymes, ALT of 233, AST of 263 and alk-phos elevated to 1113, GGT was also elevated, as noted above patient's bilirubin on arrival was 2.3.  Lipase and WBC were normal.    CT performed in the ED showed a pancreatic head mass measuring 2.2 cm with moderate dilation of the biliary ductal system as well as severe dilation of the pancreatic duct with strong suspicion for obstruction.  There was also noted possible regional enlarged lymph nodes in the periportal region, bones were grossly intact with no abdominal sclerosis or lucency to indicate metastatic disease.    GI is being consulted for pancreatic mass with possible need of ERCP.    She endorses loss of appetite, itching, weight loss and change in stool. Stool changes were light, then dark at times.   She denies abdominal pain, change in bowel habits, constipation, nausea, vomiting, diarrhea, reflux symptoms, difficulty in swallowing, pain with swallowing, jaundice, melena, hematemesis, hematochezia, night sweats, and fever.    Last colonoscopy 3 years ago, cant remember where, was told it was normal.    Overnight Events:  NAEO, patient doing very well this morning without any  "complaints.    Review of Systems:  Pertinent positives and negatives listed in HPI. All other systems are reviewed and are negative.    Past Medical History: See above    Past Surgical History:  She  has a past surgical history that includes Colonoscopy.    Allergies:  Review of patient's allergies indicates:  No Known Allergies    Family History:  Her family history includes Cancer in her mother; Leukemia in her father.    Social History:  She  reports that she has quit smoking. Her smoking use included cigarettes. She has never used smokeless tobacco. She reports that she does not drink alcohol and does not use drugs.  Quit 30 years ago, approx 25 pyh    Home Medications:  She has a current medication list which includes the following prescription(s): aspirin, ergocalciferol, gabapentin, lisinopril-hydrochlorothiazide, metformin, mupirocin, potassium chloride, simvastatin, and triamcinolone acetonide 0.025%, and the following Facility-Administered Medications: sodium chloride 0.9%, dextrose 10%, dextrose 10%, enoxaparin, glucagon (human recombinant), glucose, glucose, hydrocortisone, hydroxyzine pamoate, indomethacin, insulin aspart u-100, lactated ringers, lactated ringers, lactated ringers, naloxone, ondansetron, and sodium chloride 0.9%.        Objective:   Vitals  Vitals  BP: 130/79  Temp: 98 °F (36.7 °C)  Temp Source: Oral  Pulse: 85  Resp: 18  SpO2: 97 %  Height: 5' 1" (154.9 cm)  Weight: 75 kg (165 lb 5.5 oz)    Physical Examination:  General: Awake, alert, & oriented to person, place & time. No acute distress  Psychiatric: Mood and affect normal  HEENT: Normocephalic, atraumatic. Face symmetric. Dry mucus membranes  Cardiovascular: Regular rate & rhythm  Pulmonary: Bilateral symmetric chest rise, Non-labored  Abdominal:  nondistended, non-tender  Extremities: No clubbing or cyanosis.  Skin:  Exposed skin is warm & dry.  Neuro:   Patient moves all extremities equally. Sensation intact " bilateraly.      Laboratory:  All pertinent labs within the past 24 hours have been reviewed.    Radiology:  I have reviewed all pertinent imaging results/findings within the past 24 hours.    Assessment & Plan:     Pancreatic head mass with obstruction  -ERCP with stent placement yesterday  -at this time patient's liver enzymes are improving likely stable for discharge per primary team  -will need outpatient EUS for biopsy  -Rest per primary team      Franklin Nieto MD  Internal Medicine - PGY-3

## 2023-09-15 ENCOUNTER — TELEPHONE (OUTPATIENT)
Dept: GASTROENTEROLOGY | Facility: CLINIC | Age: 83
End: 2023-09-15
Payer: COMMERCIAL

## 2023-09-15 ENCOUNTER — PATIENT OUTREACH (OUTPATIENT)
Dept: ADMINISTRATIVE | Facility: CLINIC | Age: 83
End: 2023-09-15
Payer: COMMERCIAL

## 2023-09-15 DIAGNOSIS — C25.9 PANCREATIC ADENOCARCINOMA: Primary | ICD-10-CM

## 2023-09-15 LAB
ESTROGEN SERPL-MCNC: ABNORMAL PG/ML
INSULIN SERPL-ACNC: ABNORMAL U[IU]/ML
LAB AP CLINICAL INFORMATION: ABNORMAL
LAB AP GROSS DESCRIPTION: ABNORMAL
LAB AP NON-GYN INTERPRETATION SPECIMEN 1: ABNORMAL

## 2023-09-15 NOTE — DISCHARGE SUMMARY
[facility] Medicine Discharge Summary    Admitting physician: Yessenia Clark MD  Attending physician: No att. providers found  Date of admit: 9/11/2023  Date of discharge: 9/14/2023    Condition: Stable  Outcome: Patient tolerated treatment/procedure well without complication and is now ready for discharge.  Disposition: Home or Self Care    Discharge Diagnoses     Principal Problem:  Pancreatic mass    Patient Active Problem List   Diagnosis    Family history of malignant neoplasm of colon    Hyperlipidemia    Hypertension    Obesity    Type 2 diabetes mellitus    Pancreatic mass    Moderate malnutrition     Consultants and Procedures     Consultants:  Consults (From admission, onward)          Status Ordering Provider     Inpatient consult to Gastroenterology  Once        Provider:  Erika Ulloa MD    Completed BANDAR BABIN           Procedures:  Procedure(s) (LRB):  ERCP (ENDOSCOPIC RETROGRADE CHOLANGIOPANCREATOGRAPHY)/ cytology brushing (N/A)  EGD (ESOPHAGOGASTRODUODENOSCOPY) (N/A)  ERCP, WITH SPHINCTEROTOMY  ERCP, WITH STENT INSERTION    Brief History of Present Illness      Darby Gary is a 83 y.o. female with PMH of HTN, HLD, Diabetes mellitus type 2 who presented to [facility] ED on 9/11/2023 due to abnormal labs. She was asked by her Nephrologist to come in due to elevated liver enzymes on recent blood work.  Patient reports a 2-3 month history of unintentional weight loss, pale stools and dark colored urine. She reports increased itchiness in her abdomen and back x 3 weeks. Denies any vaginal bleeding, N/V, hx of blood clots, dysuria, increased urinary frequency, shortness of breath, chest pain. She is not on any blood thinners.   CMP in the ED today revealed ALT - 233, AST - 263, ALP - 1113. Hepatitis panel was non reactive. Lipase wnl, Direct bilirubin elevated.   Positive family hx of colon cancer. Last colonoscopy was 3 years ago; patient had polyps that were removed at the time.   CT abdomen  "pelvis with contrast was done and it revealed mid dilatation of the intra and extrahepatic biliary ducts, and the pancreatic duct with a subtle 2.4 x 2 cm relatively hypodense lesion on the head of the pancreas with ill defined margines; suspicious for pancreatic neoplasm. A few mildly prominent periportal lymph nodes were also seen on CT.   Patient was admitted for GI consult for further work up of pancreatic mass.    Hospital Course with Pertinent Findings     CT abdomen pelvis showed mid dilatation of the intra and extrahepatic biliary ducts and a subtle 2.4 x 2 cm relatively hypodense lesion on the head of the pancreas with ill defined margines likely compressing the pancreatic duct; suspicious for pancreatic neoplasm and a few mildly prominent periportal lymph nodes. Consulted GI who performed ERCP with stent placement. Jaundice and pruritis drastically improved post ERCP stent placement. Patient was in stable condition. She was discharged to home with continuation of home medications and close follow up. Patient will have a post wards visit in 1-2 weeks followed by establishment of care with PCP. She will follow up with Dr. Jyo (Gastroenterologist) for EUS biopsy of pancreatic head mass. Patient will then follow up with Dr. Delgadillo (Hematologist/Oncologist) post EUS for management and tx of likely pancreatic neoplasm. Please schedule follow up post wards visit for patient to follow up on her EUS. Please contact Dr. 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    Objective     Vital Signs:  Vitals  BP: 130/79  Temp: 98 °F (36.7 °C)  Temp Source: Oral  Pulse: 85  Resp: 18  SpO2: 97 %  Height: 5' 1" (154.9 cm)  Weight: 75 kg (165 lb 5.5 oz)    Physical Exam  Physical Exam  Vitals reviewed.   Constitutional:       General: She is not in acute distress.     Appearance: Normal appearance. She is normal weight. She is not ill-appearing, toxic-appearing or " diaphoretic.   HENT:      Head: Normocephalic and atraumatic.      Right Ear: External ear normal.      Left Ear: External ear normal.   Eyes:      General: No scleral icterus.        Right eye: No discharge.         Left eye: No discharge.      Extraocular Movements: Extraocular movements intact.      Conjunctiva/sclera: Conjunctivae normal.      Pupils: Pupils are equal, round, and reactive to light.   Cardiovascular:      Rate and Rhythm: Normal rate and regular rhythm.      Pulses: Normal pulses.      Heart sounds: Normal heart sounds. No murmur heard.     No friction rub. No gallop.   Pulmonary:      Effort: Pulmonary effort is normal. No respiratory distress.      Breath sounds: Normal breath sounds. No wheezing, rhonchi or rales.   Abdominal:      General: Abdomen is flat. Bowel sounds are normal. There is no distension.      Palpations: Abdomen is soft.      Tenderness: There is no abdominal tenderness. There is no guarding.   Musculoskeletal:         General: No swelling, tenderness, deformity or signs of injury. Normal range of motion.      Right lower leg: No edema.      Left lower leg: No edema.   Skin:     General: Skin is warm and dry.      Capillary Refill: Capillary refill takes less than 2 seconds.      Coloration: Skin is not jaundiced.      Findings: No bruising, erythema or rash.   Neurological:      Mental Status: She is alert.      Comments: AAO to person, place, time, and situation; CN II-XII grossly intact         Discharge Medications        Medication List        START taking these medications      aspirin 81 MG EC tablet  Commonly known as: ECOTRIN  Take 1 tablet (81 mg total) by mouth once daily.            CONTINUE taking these medications      ergocalciferol 50,000 unit Cap  Commonly known as: ERGOCALCIFEROL     gabapentin 300 MG capsule  Commonly known as: NEURONTIN     lisinopriL-hydrochlorothiazide 10-12.5 mg per tablet  Commonly known as: PRINZIDE,ZESTORETIC     metFORMIN 500 MG  tablet  Commonly known as: GLUCOPHAGE     mupirocin 2 % ointment  Commonly known as: BACTROBAN     potassium chloride 10 MEQ Tbsr  Commonly known as: KLOR-CON     simvastatin 40 MG tablet  Commonly known as: ZOCOR     triamcinolone acetonide 0.025% 0.025 % cream  Commonly known as: KENALOG               Where to Get Your Medications        These medications were sent to [place] [address] [facility] [address]      Phone: [phone]   aspirin 81 MG EC tablet       Discharge Information:     Mr. Darby Gary is being discharged Home or Self Care.    Discharge Procedure Orders   Ambulatory referral/consult to Internal Medicine   Standing Status: Future   Referral Priority: Routine Referral Type: Consultation   Referral Reason: Specialty Services Required   Referred to Provider: NAKUL HAN Specialty: Internal Medicine   Number of Visits Requested: 1        Follow-Up Appointments:   Follow-up Information       [facility] INTERNAL MEDICINE Follow up in 1 week(s).               [facility] Follow up in 1 month(s).    Specialty: Internal Medicine  Contact information:  [address] [place] [id number] [phone]                           To address at follow-up:  -Recommend the following labs be drawn at follow up visit: CMP  -Recommend the following imaging studies be ordered at follow up visit: Please schedule follow up post wards visit for patient to follow up on her EUS. Please contact Dr. Delgadillo post EUS to establish care for management and tx of likely pancreatic neoplasm.    At this time, Darby Gary is determined to have maximized benefits of inpatient hospitalization. Hospital course and discharge care plan has been discussed with patient at length, including prescriptions that were started this admission and prescriptions to be continued post discharge. All  questions answered, and patient and family verbalized agreement with the plan of care. They were given return precautions prior to discharge including alarm symptoms that should prompt return to ED or call to PCP.    TIME SPENT ON DISCHARGE: 60 minutes    Carlton Nieves MD  [facility] Internal Medicine, PGY-1

## 2023-09-15 NOTE — PROGRESS NOTES
C3 nurse spoke with Darby Gary  for a TCC post hospital discharge follow up call. The patient has a scheduled HOSFU appointment with Elizabeth Andersen @ [facility] on 09/25/2023. Patient concerned she doesn't have an earlier appointment. Called [facility] and spoke with Fadia. Appointment with Elizabeth Andersen changed to Tuesday 09/19/2023 @ 1030 am. Patient made aware. Appointment with [facility] IM Jyavgv33/09/2023 @ 1250 pm.

## 2023-09-15 NOTE — TELEPHONE ENCOUNTER
----- Message from Erika Ulloa MD sent at 9/15/2023  2:50 PM CDT -----  Spoke with patient and daughter regarding bile duct brushing results.  Please send referral to Dr. Chucky Olguin and oncology Re: pancreatic cancer.  She may likely not need the EUS at this point.

## 2023-09-15 NOTE — PROGRESS NOTES
Spoke with patient and daughter regarding bile duct brushing results.  Please send referral to Dr. Chucky Olguin and oncology Re: pancreatic cancer.  She may likely not need the EUS at this point.

## 2023-09-25 ENCOUNTER — OFFICE VISIT (OUTPATIENT)
Dept: HEMATOLOGY/ONCOLOGY | Facility: CLINIC | Age: 83
End: 2023-09-25
Payer: COMMERCIAL

## 2023-09-25 VITALS
TEMPERATURE: 97 F | HEART RATE: 79 BPM | RESPIRATION RATE: 18 BRPM | OXYGEN SATURATION: 97 % | SYSTOLIC BLOOD PRESSURE: 164 MMHG | WEIGHT: 160.81 LBS | DIASTOLIC BLOOD PRESSURE: 78 MMHG | BODY MASS INDEX: 30.36 KG/M2 | HEIGHT: 61 IN

## 2023-09-25 DIAGNOSIS — C25.9 PANCREATIC ADENOCARCINOMA: ICD-10-CM

## 2023-09-25 PROCEDURE — 1159F PR MEDICATION LIST DOCUMENTED IN MEDICAL RECORD: ICD-10-PCS | Mod: CPTII,S$GLB,, | Performed by: INTERNAL MEDICINE

## 2023-09-25 PROCEDURE — 3288F PR FALLS RISK ASSESSMENT DOCUMENTED: ICD-10-PCS | Mod: CPTII,S$GLB,, | Performed by: INTERNAL MEDICINE

## 2023-09-25 PROCEDURE — 99205 PR OFFICE/OUTPT VISIT, NEW, LEVL V, 60-74 MIN: ICD-10-PCS | Mod: S$GLB,,, | Performed by: INTERNAL MEDICINE

## 2023-09-25 PROCEDURE — 99999 PR PBB SHADOW E&M-EST. PATIENT-LVL V: ICD-10-PCS | Mod: PBBFAC,,, | Performed by: INTERNAL MEDICINE

## 2023-09-25 PROCEDURE — 3077F SYST BP >= 140 MM HG: CPT | Mod: CPTII,S$GLB,, | Performed by: INTERNAL MEDICINE

## 2023-09-25 PROCEDURE — 1126F PR PAIN SEVERITY QUANTIFIED, NO PAIN PRESENT: ICD-10-PCS | Mod: CPTII,S$GLB,, | Performed by: INTERNAL MEDICINE

## 2023-09-25 PROCEDURE — 1101F PR PT FALLS ASSESS DOC 0-1 FALLS W/OUT INJ PAST YR: ICD-10-PCS | Mod: CPTII,S$GLB,, | Performed by: INTERNAL MEDICINE

## 2023-09-25 PROCEDURE — 1159F MED LIST DOCD IN RCRD: CPT | Mod: CPTII,S$GLB,, | Performed by: INTERNAL MEDICINE

## 2023-09-25 PROCEDURE — 1111F PR DISCHARGE MEDS RECONCILED W/ CURRENT OUTPATIENT MED LIST: ICD-10-PCS | Mod: CPTII,S$GLB,, | Performed by: INTERNAL MEDICINE

## 2023-09-25 PROCEDURE — 1160F RVW MEDS BY RX/DR IN RCRD: CPT | Mod: CPTII,S$GLB,, | Performed by: INTERNAL MEDICINE

## 2023-09-25 PROCEDURE — 3078F DIAST BP <80 MM HG: CPT | Mod: CPTII,S$GLB,, | Performed by: INTERNAL MEDICINE

## 2023-09-25 PROCEDURE — 1101F PT FALLS ASSESS-DOCD LE1/YR: CPT | Mod: CPTII,S$GLB,, | Performed by: INTERNAL MEDICINE

## 2023-09-25 PROCEDURE — 99205 OFFICE O/P NEW HI 60 MIN: CPT | Mod: S$GLB,,, | Performed by: INTERNAL MEDICINE

## 2023-09-25 PROCEDURE — 3288F FALL RISK ASSESSMENT DOCD: CPT | Mod: CPTII,S$GLB,, | Performed by: INTERNAL MEDICINE

## 2023-09-25 PROCEDURE — 1111F DSCHRG MED/CURRENT MED MERGE: CPT | Mod: CPTII,S$GLB,, | Performed by: INTERNAL MEDICINE

## 2023-09-25 PROCEDURE — 99999 PR PBB SHADOW E&M-EST. PATIENT-LVL V: CPT | Mod: PBBFAC,,, | Performed by: INTERNAL MEDICINE

## 2023-09-25 PROCEDURE — 1160F PR REVIEW ALL MEDS BY PRESCRIBER/CLIN PHARMACIST DOCUMENTED: ICD-10-PCS | Mod: CPTII,S$GLB,, | Performed by: INTERNAL MEDICINE

## 2023-09-25 PROCEDURE — 3077F PR MOST RECENT SYSTOLIC BLOOD PRESSURE >= 140 MM HG: ICD-10-PCS | Mod: CPTII,S$GLB,, | Performed by: INTERNAL MEDICINE

## 2023-09-25 PROCEDURE — 1126F AMNT PAIN NOTED NONE PRSNT: CPT | Mod: CPTII,S$GLB,, | Performed by: INTERNAL MEDICINE

## 2023-09-25 PROCEDURE — 3078F PR MOST RECENT DIASTOLIC BLOOD PRESSURE < 80 MM HG: ICD-10-PCS | Mod: CPTII,S$GLB,, | Performed by: INTERNAL MEDICINE

## 2023-09-25 NOTE — PROGRESS NOTES
Subjective:       Patient ID: Darby Gary is a 83 y.o. female.    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  This was staged using CT scan.    Current Treatment:   If surgically resectable (sees Dr. Olguin on 10/03/2023), will treat with modified FOLFIRINOX without the 5FU bolus.    Treatment History:  ERCP on 09/13/2023    HPI:  83-year-old female was seen by her nephrologist, blood work revealed elevated liver enzymes and bilirubin, she was instructed to present to the emergency department on 09/11/2023.  She presented to [facility] on 09/11/2023.  CMP in the emergency department revealed an ALT of 233, AST of 263, alkaline phosphatase of 1113, and a bilirubin of 2.3.  Hepatitis panel was done and was negative, lipase was also within normal limits.  A CEA was normal at 1.96, CA 19 9 was extremely elevated at 2461.89.  The patient underwent a CT scan of the abdomen and pelvis with contrast on 09/11/2023 that revealed suspected obstructive pancreatic head mass measuring 2.2 cm with moderate dilation of the biliary ductal system and severe dilation of the pancreatic duct.  There was 1-2 possible regional enlarged lymph nodes in the periportal region.  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  A biliary sphincterotomy was performed and 1 uncovered metal biliary stent was placed.  Brushings were obtained, cytology revealed adenocarcinoma.  She was discharged home in stable condition on 09/14/2023.  The patient was referred to Medical Oncology.  I saw the patient on 09/25/2023.  She stated that she was feeling okay.  She had no major issues to discuss.    Interval History:   Initial consult note.    Past Medical History:   Diagnosis Date    Arthritis     Hyperlipidemia     Hypertension      Osteoporosis       Past Surgical History:   Procedure Laterality Date    COLONOSCOPY      ERCP N/A 9/13/2023    Procedure: ERCP (ENDOSCOPIC RETROGRADE CHOLANGIOPANCREATOGRAPHY)/ cytology brushing;  Surgeon: Erika Ulloa MD;  Location: [facility] ENDOSCOPY;  Service: Gastroenterology;  Laterality: N/A;    ERCP W/ SPHICTEROTOMY  9/13/2023    Procedure: ERCP, WITH SPHINCTEROTOMY;  Surgeon: Erika Ulloa MD;  Location: [facility] ENDOSCOPY;  Service: Gastroenterology;;    ERCP, WITH STENT INSERTION  9/13/2023    Procedure: ERCP, WITH STENT INSERTION;  Surgeon: Erika Ulloa MD;  Location: [facility] ENDOSCOPY;  Service: Gastroenterology;;    ESOPHAGOGASTRODUODENOSCOPY N/A 9/13/2023    Procedure: EGD (ESOPHAGOGASTRODUODENOSCOPY);  Surgeon: Erika Ulloa MD;  Location: [facility] ENDOSCOPY;  Service: Gastroenterology;  Laterality: N/A;     Social History     Socioeconomic History    Marital status:    Tobacco Use    Smoking status: Former     Types: Cigarettes    Smokeless tobacco: Never    Tobacco comments:     Quit over 25 years ago.    Substance and Sexual Activity    Alcohol use: Never    Drug use: Never    Sexual activity: Not Currently     Social Determinants of Health     Financial Resource Strain: High Risk (9/13/2023)    Overall Financial Resource Strain (CARDIA)     Difficulty of Paying Living Expenses: Hard   Food Insecurity: Food Insecurity Present (9/13/2023)    Hunger Vital Sign     Worried About Running Out of Food in the Last Year: Sometimes true     Ran Out of Food in the Last Year: Sometimes true   Transportation Needs: No Transportation Needs (9/13/2023)    PRAPARE - Transportation     Lack of Transportation (Medical): No     Lack of Transportation (Non-Medical): No   Physical Activity: Inactive (9/13/2023)    Exercise Vital Sign     Days of Exercise per Week: 0 days     Minutes of Exercise per Session: 0 min   Stress: No Stress Concern Present (9/13/2023)    [employer] [place] [employer]  Occupational Health - Occupational Stress Questionnaire     Feeling of Stress : Only a little   Social Connections: Moderately Isolated (9/13/2023)    Social Connection and Isolation Panel [NHANES]     Frequency of Communication with Friends and Family: More than three times a week     Frequency of Social Gatherings with Friends and Family: More than three times a week     Attends Protestant Services: More than 4 times per year     Active Member of Clubs or Organizations: No     Attends Club or Organization Meetings: Never     Marital Status:    Housing Stability: Low Risk  (9/13/2023)    Housing Stability Vital Sign     Unable to Pay for Housing in the Last Year: No     Number of Places Lived in the Last Year: 1     Unstable Housing in the Last Year: No      Family History   Problem Relation Age of Onset    Cancer Father     Leukemia Father       Review of patient's allergies indicates:  No Known Allergies   Review of Systems   Constitutional:  Negative for chills, diaphoresis, fatigue, fever and unexpected weight change.   HENT:  Negative for nasal congestion, mouth sores, sinus pressure/congestion and sore throat.    Eyes:  Negative for pain and visual disturbance.   Respiratory:  Negative for cough, chest tightness and shortness of breath.    Cardiovascular:  Negative for chest pain, palpitations and leg swelling.   Gastrointestinal:  Negative for abdominal distention, abdominal pain, blood in stool, constipation and diarrhea.   Genitourinary:  Negative for dysuria, frequency and hematuria.   Musculoskeletal:  Negative for arthralgias and back pain.   Integumentary:  Negative for rash.   Neurological:  Negative for dizziness, weakness, numbness and headaches.   Hematological:  Negative for adenopathy.   Psychiatric/Behavioral:  Negative for confusion.          Objective:      Physical Exam  Vitals reviewed. Exam conducted with a chaperone present.   Constitutional:       General: She is not in acute  distress.     Appearance: Normal appearance.   HENT:      Head: Normocephalic and atraumatic.      Nose: Nose normal.      Mouth/Throat:      Mouth: Mucous membranes are moist.   Eyes:      Extraocular Movements: Extraocular movements intact.      Conjunctiva/sclera: Conjunctivae normal.   Cardiovascular:      Rate and Rhythm: Normal rate and regular rhythm.      Pulses: Normal pulses.      Heart sounds: Normal heart sounds.   Pulmonary:      Effort: Pulmonary effort is normal.      Breath sounds: Normal breath sounds.   Abdominal:      General: Bowel sounds are normal.      Palpations: Abdomen is soft.   Musculoskeletal:         General: No swelling. Normal range of motion.      Cervical back: Normal range of motion and neck supple.      Right lower leg: No edema.      Left lower leg: No edema.   Lymphadenopathy:      Cervical: No cervical adenopathy.   Skin:     General: Skin is warm and dry.   Neurological:      General: No focal deficit present.      Mental Status: She is alert and oriented to person, place, and time. Mental status is at baseline.   Psychiatric:         Mood and Affect: Mood normal.         Behavior: Behavior normal.         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  This was staged using CT scan.        Plan:       Patient has at least stage IIB pancreatic cancer based on CT abdomen/pelvis measurements.     She needs complete staging, will order PET/CT scan to be done.     Patient will see Dr. Chucky Olguin on 10/03/2023.  If he was potentially resectable, we will plan to treat with modified FOLFIRINOX and hold the 5 FU bolus.  I will ask Dr. Benavidez place a MediPort.  All of this is assuming that she was resectable and does not have metastatic disease.    If the patient was not resectable or had metastatic disease, we would likely consider gem Abraxane.    We will set the patient up for patient  education.    We will refer the patient to Genetics.    I will plan to start her on treatment on 10/10/2023.    She will follow up with me prior to cycle 2.      Javed Forman II, MD

## 2023-10-01 RX ORDER — SIMVASTATIN 40 MG/1
40 TABLET, FILM COATED ORAL DAILY
COMMUNITY
Start: 2023-09-01

## 2023-10-01 NOTE — H&P (VIEW-ONLY)
History & Physical    CHIEF COMPLAINT:  PANCREATIC CANCER     History of Present Illness:  83 year-old-female referred by Dr. Erika Ulloa.  Patient was sent to the ER by her nephrologist for elevated liver enzymes and bilirubin.  In the ER, , , alkaline phos 1113, bilirubin 2.3; hepatitis panel negative, lipase normal; CEA 1.96,  2461.89.  CT abd/pel showed suspected obstructive pancreatic head mass measuring 2.2 cm with moderate dilation of the biliary ductal system and severe dilation of the pancreatic duct; possible regional enlarged lymph nodes in the periportal region.  ERCP performed, a single biliary stricture was found in the lower third of the main bile duct; the upper third of the main bile duct and middle third of the main bile duct were moderately dilated, with a mass causing an obstruction; biliary sphincterotomy was performed; cells for cytology obtained in the lower third of the main duct; One uncovered metal biliary stent was placed into the common bile duct.  Cytology revealed adenocarcinoma.  Patient has been seen by Dr. Forman.  PET scan to be performed 10/5/23.    Greater than 60 minutes was required for complete chart review, imaging review including interpretation of imaging, coordination with referring/other physicians, patient counseling regarding diagnosis/treatment plan, answering questions, medical decision making, and documentation.    Review of patient's allergies indicates:  No Known Allergies    Current Outpatient Medications   Medication Sig Dispense Refill    simvastatin (ZOCOR) 40 MG tablet Take 40 mg by mouth once daily.      aspirin (ECOTRIN) 81 MG EC tablet Take 1 tablet (81 mg total) by mouth once daily. 30 tablet 11    cyclobenzaprine HCl (CYCLOBENZAPRINE ORAL) Take 1 tablet by mouth 3 times daily as needed.      ergocalciferol (ERGOCALCIFEROL) 50,000 unit Cap Take 50,000 Units by mouth every 7 days.      gabapentin (NEURONTIN) 300 MG capsule Take  300-600 mg by mouth every evening.      lisinopriL-hydrochlorothiazide (PRINZIDE,ZESTORETIC) 10-12.5 mg per tablet       metFORMIN (GLUCOPHAGE) 500 MG tablet Take 500 mg by mouth once daily.      mupirocin (BACTROBAN) 2 % ointment mupirocin 2 % topical ointment      potassium chloride (KLOR-CON) 10 MEQ TbSR Take 10 mEq by mouth once daily.      simvastatin (ZOCOR) 40 MG tablet SMARTSI Tablet(s) By Mouth Every Evening      triamcinolone acetonide 0.025% (KENALOG) 0.025 % cream triamcinolone acetonide 0.025 % topical cream   APPLY TO THE AFFECTED AREA(S) ONCE a DAY       No current facility-administered medications for this visit.       Past Medical History:   Diagnosis Date    Arthritis     Hyperlipidemia     Hypertension     Osteoporosis      Past Surgical History:   Procedure Laterality Date    COLONOSCOPY      ERCP N/A 2023    Procedure: ERCP (ENDOSCOPIC RETROGRADE CHOLANGIOPANCREATOGRAPHY)/ cytology brushing;  Surgeon: Erika Ulloa MD;  Location: [facility] ENDOSCOPY;  Service: Gastroenterology;  Laterality: N/A;    ERCP W/ SPHICTEROTOMY  2023    Procedure: ERCP, WITH SPHINCTEROTOMY;  Surgeon: Erika Ulloa MD;  Location: [facility] ENDOSCOPY;  Service: Gastroenterology;;    ERCP, WITH STENT INSERTION  2023    Procedure: ERCP, WITH STENT INSERTION;  Surgeon: Erika Ulloa MD;  Location: [facility] ENDOSCOPY;  Service: Gastroenterology;;    ESOPHAGOGASTRODUODENOSCOPY N/A 2023    Procedure: EGD (ESOPHAGOGASTRODUODENOSCOPY);  Surgeon: Erika Ulloa MD;  Location: [facility] ENDOSCOPY;  Service: Gastroenterology;  Laterality: N/A;     Family History   Problem Relation Age of Onset    Cancer Father     Leukemia Father      Social History     Tobacco Use    Smoking status: Former     Types: Cigarettes    Smokeless tobacco: Never    Tobacco comments:     Quit over 25 years ago.    Substance Use Topics    Alcohol use: Never    Drug use: Never        Review of Systems:  Review of Systems    Constitutional:  Negative for appetite change, chills, diaphoresis and fever.   HENT:  Negative for congestion, drooling, ear discharge, ear pain and hearing loss.    Eyes:  Negative for discharge.   Respiratory:  Negative for apnea, cough, choking, chest tightness, shortness of breath and stridor.    Cardiovascular:  Negative for chest pain, palpitations and leg swelling.   Endocrine: Negative for cold intolerance and heat intolerance.   Genitourinary:  Negative for difficulty urinating, dyspareunia, dysuria and hematuria.   Musculoskeletal:  Negative for arthralgias, gait problem and joint swelling.   Skin:  Negative for color change and rash.   Neurological:  Negative for dizziness, tremors, seizures, syncope, facial asymmetry, speech difficulty, light-headedness, numbness and headaches.   Psychiatric/Behavioral:  Negative for agitation and confusion.        OBJECTIVE:     Vital Signs (Most Recent)              Physical Exam:  Physical Exam  Constitutional:       General: She is not in acute distress.     Appearance: Normal appearance. She is not toxic-appearing.   HENT:      Head: Normocephalic and atraumatic.      Right Ear: External ear normal.      Left Ear: External ear normal.      Mouth/Throat:      Mouth: Mucous membranes are moist.   Eyes:      General: No scleral icterus.     Conjunctiva/sclera: Conjunctivae normal.      Pupils: Pupils are equal, round, and reactive to light.   Cardiovascular:      Rate and Rhythm: Normal rate and regular rhythm.      Pulses: Normal pulses.   Pulmonary:      Effort: Pulmonary effort is normal. No respiratory distress.      Breath sounds: Normal breath sounds. No stridor. No wheezing or rhonchi.   Abdominal:      General: Abdomen is flat. There is no distension.      Palpations: Abdomen is soft. There is no mass.      Tenderness: There is no abdominal tenderness. There is no guarding or rebound.      Hernia: No hernia is present.   Musculoskeletal:         General: No  swelling or tenderness. Normal range of motion.      Cervical back: Normal range of motion and neck supple.      Right lower leg: No edema.      Left lower leg: No edema.   Skin:     General: Skin is warm.      Capillary Refill: Capillary refill takes less than 2 seconds.      Coloration: Skin is not jaundiced or pale.      Findings: No erythema or rash.   Neurological:      General: No focal deficit present.      Mental Status: She is alert and oriented to person, place, and time.      Gait: Gait normal.   Psychiatric:         Mood and Affect: Mood normal.         Behavior: Behavior normal.         Judgment: Judgment normal.         ASSESSMENT/PLAN:     Adenocarcinoma of the pancreatic head, borderline resectable with evidence of regional lymphadenopathy    Diagnosis, staging, prognosis and treatment guidelines for pancreatic cancer were discussed with the patient in detail, all questions were addressed.    Recommend neoadjuvant therapy followed by restaging imaging and consideration for surgical resection if no evidence of progression of disease or metastatic disease.    Schedule MediPort placement with diagnostic/staging laparoscopy to rule out occult metastatic disease.    The risks and benefits of the procedure were explained in detail using layman terms, including the pros and cons of any implant that may be used, all questions were addressed, the patient gives consent to proceed    Chucky Olguin MD  Surgical Oncology  Complex General, Gastrointestinal and Hepatobiliary Surgery

## 2023-10-01 NOTE — PROGRESS NOTES
History & Physical    CHIEF COMPLAINT:  PANCREATIC CANCER     History of Present Illness:  83 year-old-female referred by Dr. Erika Ulloa.  Patient was sent to the ER by her nephrologist for elevated liver enzymes and bilirubin.  In the ER, , , alkaline phos 1113, bilirubin 2.3; hepatitis panel negative, lipase normal; CEA 1.96,  2461.89.  CT abd/pel showed suspected obstructive pancreatic head mass measuring 2.2 cm with moderate dilation of the biliary ductal system and severe dilation of the pancreatic duct; possible regional enlarged lymph nodes in the periportal region.  ERCP performed, a single biliary stricture was found in the lower third of the main bile duct; the upper third of the main bile duct and middle third of the main bile duct were moderately dilated, with a mass causing an obstruction; biliary sphincterotomy was performed; cells for cytology obtained in the lower third of the main duct; One uncovered metal biliary stent was placed into the common bile duct.  Cytology revealed adenocarcinoma.  Patient has been seen by Dr. Forman.  PET scan to be performed 10/5/23.    Greater than 60 minutes was required for complete chart review, imaging review including interpretation of imaging, coordination with referring/other physicians, patient counseling regarding diagnosis/treatment plan, answering questions, medical decision making, and documentation.    Review of patient's allergies indicates:  No Known Allergies    Current Outpatient Medications   Medication Sig Dispense Refill    simvastatin (ZOCOR) 40 MG tablet Take 40 mg by mouth once daily.      aspirin (ECOTRIN) 81 MG EC tablet Take 1 tablet (81 mg total) by mouth once daily. 30 tablet 11    cyclobenzaprine HCl (CYCLOBENZAPRINE ORAL) Take 1 tablet by mouth 3 times daily as needed.      ergocalciferol (ERGOCALCIFEROL) 50,000 unit Cap Take 50,000 Units by mouth every 7 days.      gabapentin (NEURONTIN) 300 MG capsule Take  300-600 mg by mouth every evening.      lisinopriL-hydrochlorothiazide (PRINZIDE,ZESTORETIC) 10-12.5 mg per tablet       metFORMIN (GLUCOPHAGE) 500 MG tablet Take 500 mg by mouth once daily.      mupirocin (BACTROBAN) 2 % ointment mupirocin 2 % topical ointment      potassium chloride (KLOR-CON) 10 MEQ TbSR Take 10 mEq by mouth once daily.      simvastatin (ZOCOR) 40 MG tablet SMARTSI Tablet(s) By Mouth Every Evening      triamcinolone acetonide 0.025% (KENALOG) 0.025 % cream triamcinolone acetonide 0.025 % topical cream   APPLY TO THE AFFECTED AREA(S) ONCE a DAY       No current facility-administered medications for this visit.       Past Medical History:   Diagnosis Date    Arthritis     Hyperlipidemia     Hypertension     Osteoporosis      Past Surgical History:   Procedure Laterality Date    COLONOSCOPY      ERCP N/A 2023    Procedure: ERCP (ENDOSCOPIC RETROGRADE CHOLANGIOPANCREATOGRAPHY)/ cytology brushing;  Surgeon: Erika Ulloa MD;  Location: [facility] ENDOSCOPY;  Service: Gastroenterology;  Laterality: N/A;    ERCP W/ SPHICTEROTOMY  2023    Procedure: ERCP, WITH SPHINCTEROTOMY;  Surgeon: Erika Ulloa MD;  Location: [facility] ENDOSCOPY;  Service: Gastroenterology;;    ERCP, WITH STENT INSERTION  2023    Procedure: ERCP, WITH STENT INSERTION;  Surgeon: Erika Ulloa MD;  Location: [facility] ENDOSCOPY;  Service: Gastroenterology;;    ESOPHAGOGASTRODUODENOSCOPY N/A 2023    Procedure: EGD (ESOPHAGOGASTRODUODENOSCOPY);  Surgeon: Erika Ulloa MD;  Location: [facility] ENDOSCOPY;  Service: Gastroenterology;  Laterality: N/A;     Family History   Problem Relation Age of Onset    Cancer Father     Leukemia Father      Social History     Tobacco Use    Smoking status: Former     Types: Cigarettes    Smokeless tobacco: Never    Tobacco comments:     Quit over 25 years ago.    Substance Use Topics    Alcohol use: Never    Drug use: Never        Review of Systems:  Review of Systems    Constitutional:  Negative for appetite change, chills, diaphoresis and fever.   HENT:  Negative for congestion, drooling, ear discharge, ear pain and hearing loss.    Eyes:  Negative for discharge.   Respiratory:  Negative for apnea, cough, choking, chest tightness, shortness of breath and stridor.    Cardiovascular:  Negative for chest pain, palpitations and leg swelling.   Endocrine: Negative for cold intolerance and heat intolerance.   Genitourinary:  Negative for difficulty urinating, dyspareunia, dysuria and hematuria.   Musculoskeletal:  Negative for arthralgias, gait problem and joint swelling.   Skin:  Negative for color change and rash.   Neurological:  Negative for dizziness, tremors, seizures, syncope, facial asymmetry, speech difficulty, light-headedness, numbness and headaches.   Psychiatric/Behavioral:  Negative for agitation and confusion.        OBJECTIVE:     Vital Signs (Most Recent)              Physical Exam:  Physical Exam  Constitutional:       General: She is not in acute distress.     Appearance: Normal appearance. She is not toxic-appearing.   HENT:      Head: Normocephalic and atraumatic.      Right Ear: External ear normal.      Left Ear: External ear normal.      Mouth/Throat:      Mouth: Mucous membranes are moist.   Eyes:      General: No scleral icterus.     Conjunctiva/sclera: Conjunctivae normal.      Pupils: Pupils are equal, round, and reactive to light.   Cardiovascular:      Rate and Rhythm: Normal rate and regular rhythm.      Pulses: Normal pulses.   Pulmonary:      Effort: Pulmonary effort is normal. No respiratory distress.      Breath sounds: Normal breath sounds. No stridor. No wheezing or rhonchi.   Abdominal:      General: Abdomen is flat. There is no distension.      Palpations: Abdomen is soft. There is no mass.      Tenderness: There is no abdominal tenderness. There is no guarding or rebound.      Hernia: No hernia is present.   Musculoskeletal:         General: No  swelling or tenderness. Normal range of motion.      Cervical back: Normal range of motion and neck supple.      Right lower leg: No edema.      Left lower leg: No edema.   Skin:     General: Skin is warm.      Capillary Refill: Capillary refill takes less than 2 seconds.      Coloration: Skin is not jaundiced or pale.      Findings: No erythema or rash.   Neurological:      General: No focal deficit present.      Mental Status: She is alert and oriented to person, place, and time.      Gait: Gait normal.   Psychiatric:         Mood and Affect: Mood normal.         Behavior: Behavior normal.         Judgment: Judgment normal.         ASSESSMENT/PLAN:     Adenocarcinoma of the pancreatic head, borderline resectable with evidence of regional lymphadenopathy    Diagnosis, staging, prognosis and treatment guidelines for pancreatic cancer were discussed with the patient in detail, all questions were addressed.    Recommend neoadjuvant therapy followed by restaging imaging and consideration for surgical resection if no evidence of progression of disease or metastatic disease.    Schedule MediPort placement with diagnostic/staging laparoscopy to rule out occult metastatic disease.    The risks and benefits of the procedure were explained in detail using layman terms, including the pros and cons of any implant that may be used, all questions were addressed, the patient gives consent to proceed    Chucky Olguin MD  Surgical Oncology  Complex General, Gastrointestinal and Hepatobiliary Surgery

## 2023-10-03 ENCOUNTER — PATIENT MESSAGE (OUTPATIENT)
Dept: SURGERY | Facility: HOSPITAL | Age: 83
End: 2023-10-03
Payer: COMMERCIAL

## 2023-10-03 ENCOUNTER — CLINICAL SUPPORT (OUTPATIENT)
Dept: HEMATOLOGY/ONCOLOGY | Facility: CLINIC | Age: 83
End: 2023-10-03
Payer: COMMERCIAL

## 2023-10-03 ENCOUNTER — OFFICE VISIT (OUTPATIENT)
Dept: SURGICAL ONCOLOGY | Facility: CLINIC | Age: 83
End: 2023-10-03
Payer: COMMERCIAL

## 2023-10-03 ENCOUNTER — OFFICE VISIT (OUTPATIENT)
Dept: HEMATOLOGY/ONCOLOGY | Facility: CLINIC | Age: 83
End: 2023-10-03
Payer: COMMERCIAL

## 2023-10-03 ENCOUNTER — HOSPITAL ENCOUNTER (OUTPATIENT)
Dept: RADIOLOGY | Facility: HOSPITAL | Age: 83
Discharge: HOME | End: 2023-10-03
Attending: SURGERY
Payer: COMMERCIAL

## 2023-10-03 VITALS
BODY MASS INDEX: 30.02 KG/M2 | HEIGHT: 61 IN | WEIGHT: 159 LBS | HEART RATE: 76 BPM | SYSTOLIC BLOOD PRESSURE: 106 MMHG | TEMPERATURE: 97 F | DIASTOLIC BLOOD PRESSURE: 68 MMHG | OXYGEN SATURATION: 98 %

## 2023-10-03 VITALS
HEIGHT: 62 IN | HEART RATE: 76 BPM | BODY MASS INDEX: 29.3 KG/M2 | SYSTOLIC BLOOD PRESSURE: 146 MMHG | WEIGHT: 159.19 LBS | DIASTOLIC BLOOD PRESSURE: 76 MMHG

## 2023-10-03 DIAGNOSIS — C25.9 PANCREATIC ADENOCARCINOMA: Primary | ICD-10-CM

## 2023-10-03 DIAGNOSIS — Z01.818 PREOP TESTING: ICD-10-CM

## 2023-10-03 DIAGNOSIS — C25.9 PANCREATIC ADENOCARCINOMA: ICD-10-CM

## 2023-10-03 DIAGNOSIS — C25.9 PANCREATIC CANCER: ICD-10-CM

## 2023-10-03 PROCEDURE — 99999 PR PBB SHADOW E&M-EST. PATIENT-LVL III: CPT | Mod: PBBFAC,,, | Performed by: SURGERY

## 2023-10-03 PROCEDURE — 3077F SYST BP >= 140 MM HG: CPT | Mod: CPTII,S$GLB,, | Performed by: SURGERY

## 2023-10-03 PROCEDURE — 99999 PR PBB SHADOW E&M-EST. PATIENT-LVL III: ICD-10-PCS | Mod: PBBFAC,,, | Performed by: SURGERY

## 2023-10-03 PROCEDURE — 99215 OFFICE O/P EST HI 40 MIN: CPT | Mod: S$GLB,,,

## 2023-10-03 PROCEDURE — 1101F PR PT FALLS ASSESS DOC 0-1 FALLS W/OUT INJ PAST YR: ICD-10-PCS | Mod: CPTII,S$GLB,,

## 2023-10-03 PROCEDURE — 3078F DIAST BP <80 MM HG: CPT | Mod: CPTII,S$GLB,, | Performed by: SURGERY

## 2023-10-03 PROCEDURE — 99999 PR PBB SHADOW E&M-EST. PATIENT-LVL IV: CPT | Mod: PBBFAC,,,

## 2023-10-03 PROCEDURE — 1101F PT FALLS ASSESS-DOCD LE1/YR: CPT | Mod: CPTII,S$GLB,, | Performed by: SURGERY

## 2023-10-03 PROCEDURE — 3288F PR FALLS RISK ASSESSMENT DOCUMENTED: ICD-10-PCS | Mod: CPTII,S$GLB,, | Performed by: SURGERY

## 2023-10-03 PROCEDURE — 3288F PR FALLS RISK ASSESSMENT DOCUMENTED: ICD-10-PCS | Mod: CPTII,S$GLB,,

## 2023-10-03 PROCEDURE — 3077F PR MOST RECENT SYSTOLIC BLOOD PRESSURE >= 140 MM HG: ICD-10-PCS | Mod: CPTII,S$GLB,, | Performed by: SURGERY

## 2023-10-03 PROCEDURE — 99999 PR PBB SHADOW E&M-EST. PATIENT-LVL IV: ICD-10-PCS | Mod: PBBFAC,,,

## 2023-10-03 PROCEDURE — 3078F PR MOST RECENT DIASTOLIC BLOOD PRESSURE < 80 MM HG: ICD-10-PCS | Mod: CPTII,S$GLB,, | Performed by: SURGERY

## 2023-10-03 PROCEDURE — 3078F PR MOST RECENT DIASTOLIC BLOOD PRESSURE < 80 MM HG: ICD-10-PCS | Mod: CPTII,S$GLB,,

## 2023-10-03 PROCEDURE — 99205 PR OFFICE/OUTPT VISIT, NEW, LEVL V, 60-74 MIN: ICD-10-PCS | Mod: S$GLB,,, | Performed by: SURGERY

## 2023-10-03 PROCEDURE — 3074F SYST BP LT 130 MM HG: CPT | Mod: CPTII,S$GLB,,

## 2023-10-03 PROCEDURE — 1159F MED LIST DOCD IN RCRD: CPT | Mod: CPTII,S$GLB,,

## 2023-10-03 PROCEDURE — 1126F AMNT PAIN NOTED NONE PRSNT: CPT | Mod: CPTII,S$GLB,, | Performed by: SURGERY

## 2023-10-03 PROCEDURE — 1111F PR DISCHARGE MEDS RECONCILED W/ CURRENT OUTPATIENT MED LIST: ICD-10-PCS | Mod: CPTII,S$GLB,, | Performed by: SURGERY

## 2023-10-03 PROCEDURE — 99215 PR OFFICE/OUTPT VISIT, EST, LEVL V, 40-54 MIN: ICD-10-PCS | Mod: S$GLB,,,

## 2023-10-03 PROCEDURE — 99999 PR PBB SHADOW E&M-EST. PATIENT-LVL I: CPT | Mod: PBBFAC,,,

## 2023-10-03 PROCEDURE — 71045 X-RAY EXAM CHEST 1 VIEW: CPT | Mod: TC

## 2023-10-03 PROCEDURE — 3074F PR MOST RECENT SYSTOLIC BLOOD PRESSURE < 130 MM HG: ICD-10-PCS | Mod: CPTII,S$GLB,,

## 2023-10-03 PROCEDURE — 1111F DSCHRG MED/CURRENT MED MERGE: CPT | Mod: CPTII,S$GLB,, | Performed by: SURGERY

## 2023-10-03 PROCEDURE — 1111F PR DISCHARGE MEDS RECONCILED W/ CURRENT OUTPATIENT MED LIST: ICD-10-PCS | Mod: CPTII,S$GLB,,

## 2023-10-03 PROCEDURE — 1101F PR PT FALLS ASSESS DOC 0-1 FALLS W/OUT INJ PAST YR: ICD-10-PCS | Mod: CPTII,S$GLB,, | Performed by: SURGERY

## 2023-10-03 PROCEDURE — 1126F PR PAIN SEVERITY QUANTIFIED, NO PAIN PRESENT: ICD-10-PCS | Mod: CPTII,S$GLB,, | Performed by: SURGERY

## 2023-10-03 PROCEDURE — 1126F AMNT PAIN NOTED NONE PRSNT: CPT | Mod: CPTII,S$GLB,,

## 2023-10-03 PROCEDURE — 1111F DSCHRG MED/CURRENT MED MERGE: CPT | Mod: CPTII,S$GLB,,

## 2023-10-03 PROCEDURE — 3288F FALL RISK ASSESSMENT DOCD: CPT | Mod: CPTII,S$GLB,, | Performed by: SURGERY

## 2023-10-03 PROCEDURE — 1101F PT FALLS ASSESS-DOCD LE1/YR: CPT | Mod: CPTII,S$GLB,,

## 2023-10-03 PROCEDURE — 99999 PR PBB SHADOW E&M-EST. PATIENT-LVL I: ICD-10-PCS | Mod: PBBFAC,,,

## 2023-10-03 PROCEDURE — 1126F PR PAIN SEVERITY QUANTIFIED, NO PAIN PRESENT: ICD-10-PCS | Mod: CPTII,S$GLB,,

## 2023-10-03 PROCEDURE — 99205 OFFICE O/P NEW HI 60 MIN: CPT | Mod: S$GLB,,, | Performed by: SURGERY

## 2023-10-03 PROCEDURE — 3288F FALL RISK ASSESSMENT DOCD: CPT | Mod: CPTII,S$GLB,,

## 2023-10-03 PROCEDURE — 1159F PR MEDICATION LIST DOCUMENTED IN MEDICAL RECORD: ICD-10-PCS | Mod: CPTII,S$GLB,,

## 2023-10-03 PROCEDURE — 3078F DIAST BP <80 MM HG: CPT | Mod: CPTII,S$GLB,,

## 2023-10-03 RX ORDER — PROCHLORPERAZINE MALEATE 5 MG
5 TABLET ORAL EVERY 6 HOURS PRN
Qty: 20 TABLET | Refills: 5 | Status: CANCELLED | OUTPATIENT
Start: 2023-10-09

## 2023-10-03 RX ORDER — PROCHLORPERAZINE MALEATE 5 MG
5 TABLET ORAL EVERY 6 HOURS PRN
Qty: 20 TABLET | Refills: 5 | Status: SHIPPED | OUTPATIENT
Start: 2023-10-09

## 2023-10-03 RX ORDER — LOPERAMIDE HYDROCHLORIDE 2 MG/1
CAPSULE ORAL
Qty: 30 CAPSULE | Refills: 11 | Status: SHIPPED | OUTPATIENT
Start: 2023-10-09

## 2023-10-03 RX ORDER — ENOXAPARIN SODIUM 300 MG/3ML
30 INJECTION INTRAVENOUS; SUBCUTANEOUS EVERY 24 HOURS
Status: CANCELLED | OUTPATIENT
Start: 2023-10-03

## 2023-10-03 RX ORDER — LOPERAMIDE HYDROCHLORIDE 2 MG/1
CAPSULE ORAL
Qty: 30 CAPSULE | Refills: 11 | Status: CANCELLED | OUTPATIENT
Start: 2023-10-09

## 2023-10-03 RX ORDER — DEXAMETHASONE 4 MG/1
TABLET ORAL
Qty: 6 TABLET | Refills: 5 | Status: SHIPPED | OUTPATIENT
Start: 2023-10-09 | End: 2023-12-29

## 2023-10-03 RX ORDER — DEXAMETHASONE 4 MG/1
TABLET ORAL
Qty: 6 TABLET | Refills: 5 | Status: CANCELLED | OUTPATIENT
Start: 2023-10-09

## 2023-10-03 NOTE — NURSING
Parth and I met with Darby. She and her sister were present.Darby stated that she lives at home with her grandchild, his wife, and two great grandchildren . Darby inquired about lactose free supplements and Parth gave a brief explanation.  I explained my role as a  and explained to Darby that we can help on a resourceful or emotional level.I spoke a little about [facility] and list of resources that are offered. Parth explained her role as a  and answered questions that the client had regarding care. Darby inquired about her next CT appointment. Parth confirmed the dates of the appointment.     Cammy Harper, MSW intern      REECE BarreraW

## 2023-10-03 NOTE — PROGRESS NOTES
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 Regular diet as tolerated; avoid ingestion of cold foods/beverages during oxaliplatin infusion and for 4-5 days following  2. RD to continue monitoring    Nutrition Assessment    10/3/23: This is a 83 y.o.female with a medical diagnosis of pancreatic CA, s/p ERCP a few weeks ago. She reports ~30# wt loss over the past 6 months or so. Prior to ERCP she was having frequent/loose bowel movements, especially after eating. Stool was normal in color, however. She states no c/o n/v/c, no indigestion or bloating now or prior to ERCP. She states her appetite has always been good. She is tolerating a regular diet at this time. She notes that wt loss seems to have slowed in the past couple weeks.    Nutrition Factors Affecting Intake  none identified    PMHx: HLD, HTN, osteoporosis    Allergies: Patient has no known allergies.    Current Medications:    Current Outpatient Medications:     aspirin (ECOTRIN) 81 MG EC tablet, Take 1 tablet (81 mg total) by mouth once daily., Disp: 30 tablet, Rfl: 11    cyclobenzaprine HCl (CYCLOBENZAPRINE ORAL), Take 1 tablet by mouth 3 times daily as needed., Disp: , Rfl:     [START ON 10/9/2023] dexAMETHasone (DECADRON) 4 MG Tab, Take 2 tablets by mouth daily on days 2-3 of each chemotherapy cycle., Disp: 6 tablet, Rfl: 5    ergocalciferol (ERGOCALCIFEROL) 50,000 unit Cap, Take 50,000 Units by mouth every 7 days., Disp: , Rfl:     gabapentin (NEURONTIN) 300 MG capsule, Take 300-600 mg by mouth every evening., Disp: , Rfl:     lisinopriL-hydrochlorothiazide (PRINZIDE,ZESTORETIC) 10-12.5 mg per tablet, , Disp: , Rfl:  "    [START ON 10/9/2023] loperamide (IMODIUM) 2 mg capsule, Take 2 capsules by mouth at the onset of diarrhea then 1 capsule every 2 hours until diarrhea free for 12 hours., Disp: 30 capsule, Rfl: 11    metFORMIN (GLUCOPHAGE) 500 MG tablet, Take 500 mg by mouth once daily., Disp: , Rfl:     mupirocin (BACTROBAN) 2 % ointment, mupirocin 2 % topical ointment, Disp: , Rfl:     potassium chloride (KLOR-CON) 10 MEQ TbSR, Take 10 mEq by mouth once daily., Disp: , Rfl:     [START ON 10/9/2023] prochlorperazine (COMPAZINE) 5 MG tablet, Take 1 tablet (5 mg total) by mouth every 6 (six) hours as needed for Nausea., Disp: 20 tablet, Rfl: 5    simvastatin (ZOCOR) 40 MG tablet, SMARTSI Tablet(s) By Mouth Every Evening, Disp: , Rfl:     simvastatin (ZOCOR) 40 MG tablet, Take 40 mg by mouth once daily., Disp: , Rfl:     triamcinolone acetonide 0.025% (KENALOG) 0.025 % cream, triamcinolone acetonide 0.025 % topical cream  APPLY TO THE AFFECTED AREA(S) ONCE a DAY, Disp: , Rfl:     Labs: 10/3/23 K+ 3.3 (L), glu 169 (H)    Anthropometrics    Height:   Ht Readings from Last 1 Encounters:   10/03/23 5' 1" (1.549 m)      Weight:   Wt Readings from Last 5 Encounters:   10/03/23 72.1 kg (159 lb)   10/03/23 72.2 kg (159 lb 3.2 oz)   23 73 kg (160 lb 13.2 oz)   23 75 kg (165 lb 5.5 oz)   23 75.7 kg (166 lb 14.2 oz)        Usual Body Weight: 87.4 kg (192 lb)  % Weight Change: -17% in past 6 months to lowest wt 159 lb    BMI: 30 (obese I)    Ideal Weight: 47.7 kg (105 lb)  % Ideal Weight: 151%    Malnutrition in the context of chronic illness  Degree of Malnutrition: non-severe (moderate) malnutrition  Energy Intake: does not meet criteria  Interpretation of Weight Loss: >10% in 6 months  Body Fat: well nourished  Area of Body Fat Loss: does not meet criteria  Muscle Mass Loss: mild depletion  Area of Muscle Mass Loss: temple region - temporalis muscle, clavicle bone region - pectoralis major, deltoid, trapezius muscles, " and dorsal hand - interosseous musle  Fluid Accumulation: does not meet criteria  Edema: no edema present  Reduced  Strength: unable to obtain  A minimum of two characteristics is recommended for diagnosis of either severe or non-severe malnutrition.    Estimated Needs (using CBW 72.1 kg)  1606 kcal/day  [place] [address]. Jeor x 1.1 activity factor x 1.3 stress factor  94 g protein/day 1.3 g/kg CBW  1606 mL fluid/day 1 mL/kcal    Nutrition Diagnosis    PES: Malnutrition related to chronic illness as evidenced by >10% wt loss 6 months, mild muscle depletion. (new)     Nutrition Risk  moderate    Nutrition Intervention    Interventions(treatment strategy):  general/healthful diet, modified composition of meals/snacks, and purpose of nutrition education    Nutrition Education    Education Provided: food safety guidelines and oxaliplatin nutrition therapy  Teaching Method: explanation and printed materials  Comprehension: verbalizes understanding  Barriers to Learning: none evident  Expected Compliance: good  Comments: All questions were answered and dietitian's contact information was provided.    Nutrition Monitoring and Evaluation    Monitor: food and beverage intake, weight change, and electrolyte/renal panel    Follow up at next [facility] provider visit post C1 chemo.        Trinity Rubin, MS, RD, , LDN

## 2023-10-03 NOTE — PROGRESS NOTES
THERAPY EDUCATION: FOLFIRINOX    Subjective:      Patient ID: Darby Gary is a 83 y.o. female.    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  This was staged using CT scan.    Current Treatment:   If surgically resectable (sees Dr. Olguin on 10/03/2023), will treat with modified FOLFIRINOX without the 5FU bolus planned to start 10/10/23.    Treatment History:  ERCP on 09/13/2023    HPI:  83-year-old female was seen by her nephrologist, blood work revealed elevated liver enzymes and bilirubin, she was instructed to present to the emergency department on 09/11/2023.  She presented to [facility] on 09/11/2023.  CMP in the emergency department revealed an ALT of 233, AST of 263, alkaline phosphatase of 1113, and a bilirubin of 2.3.  Hepatitis panel was done and was negative, lipase was also within normal limits.  A CEA was normal at 1.96, CA 19 9 was extremely elevated at 2461.89.  The patient underwent a CT scan of the abdomen and pelvis with contrast on 09/11/2023 that revealed suspected obstructive pancreatic head mass measuring 2.2 cm with moderate dilation of the biliary ductal system and severe dilation of the pancreatic duct.  There was 1-2 possible regional enlarged lymph nodes in the periportal region.  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  A biliary sphincterotomy was performed and 1 uncovered metal biliary stent was placed.  Brushings were obtained, cytology revealed adenocarcinoma.  She was discharged home in stable condition on 09/14/2023.  The patient was referred to Medical Oncology.  I saw the patient on 09/25/2023.  She stated that she was feeling okay.  She had no major issues to discuss.    Interval History:   10/3/23: Ms. Gary presents to the clinic  accompanied by her sister for therapy education. Patient reports no major complaints at today's visit Denies fever, chills, SOB, N/V/D, constipation, recent infection, chest pain or unexplained bleeding or bruising.       Past Medical History:   Diagnosis Date    Arthritis     Hyperlipidemia     Hypertension     Osteoporosis       Past Surgical History:   Procedure Laterality Date    COLONOSCOPY      ERCP N/A 9/13/2023    Procedure: ERCP (ENDOSCOPIC RETROGRADE CHOLANGIOPANCREATOGRAPHY)/ cytology brushing;  Surgeon: Erika Ulloa MD;  Location: [facility] ENDOSCOPY;  Service: Gastroenterology;  Laterality: N/A;    ERCP W/ SPHICTEROTOMY  9/13/2023    Procedure: ERCP, WITH SPHINCTEROTOMY;  Surgeon: Erika Ulloa MD;  Location: [facility] ENDOSCOPY;  Service: Gastroenterology;;    ERCP, WITH STENT INSERTION  9/13/2023    Procedure: ERCP, WITH STENT INSERTION;  Surgeon: Erika Ulloa MD;  Location: [facility] ENDOSCOPY;  Service: Gastroenterology;;    ESOPHAGOGASTRODUODENOSCOPY N/A 9/13/2023    Procedure: EGD (ESOPHAGOGASTRODUODENOSCOPY);  Surgeon: Erika Ulloa MD;  Location: [facility] ENDOSCOPY;  Service: Gastroenterology;  Laterality: N/A;     Social History     Socioeconomic History    Marital status:    Tobacco Use    Smoking status: Former     Types: Cigarettes    Smokeless tobacco: Never    Tobacco comments:     Quit over 25 years ago.    Substance and Sexual Activity    Alcohol use: Never    Drug use: Never    Sexual activity: Not Currently     Social Determinants of Health     Financial Resource Strain: High Risk (9/13/2023)    Overall Financial Resource Strain (CARDIA)     Difficulty of Paying Living Expenses: Hard   Food Insecurity: Food Insecurity Present (9/13/2023)    Hunger Vital Sign     Worried About Running Out of Food in the Last Year: Sometimes true     Ran Out of Food in the Last Year: Sometimes true   Transportation Needs: No Transportation Needs (9/13/2023)    PRAPARE -  Transportation     Lack of Transportation (Medical): No     Lack of Transportation (Non-Medical): No   Physical Activity: Inactive (9/13/2023)    Exercise Vital Sign     Days of Exercise per Week: 0 days     Minutes of Exercise per Session: 0 min   Stress: No Stress Concern Present (9/13/2023)    [employer] - Occupational Stress Questionnaire     Feeling of Stress : Only a little   Social Connections: Moderately Isolated (9/13/2023)    Social Connection and Isolation Panel [NHANES]     Frequency of Communication with Friends and Family: More than three times a week     Frequency of Social Gatherings with Friends and Family: More than three times a week     Attends [employer] Services: More than 4 times per year     Active Member of Clubs or Organizations: No     Attends Club or Organization Meetings: Never     Marital Status:    Housing Stability: Low Risk  (9/13/2023)    Housing Stability Vital Sign     Unable to Pay for Housing in the Last Year: No     Number of Places Lived in the Last Year: 1     Unstable Housing in the Last Year: No      Family History   Problem Relation Age of Onset    Cancer Father     Leukemia Father       Review of patient's allergies indicates:  No Known Allergies   Review of Systems   Constitutional:  Negative for chills, diaphoresis, fatigue, fever and unexpected weight change.   HENT:  Negative for nasal congestion, mouth sores, sinus pressure/congestion and sore throat.    Eyes:  Negative for pain and visual disturbance.   Respiratory:  Negative for cough, chest tightness and shortness of breath.    Cardiovascular:  Negative for chest pain, palpitations and leg swelling.   Gastrointestinal:  Negative for abdominal distention, abdominal pain, blood in stool, constipation and diarrhea.   Genitourinary:  Negative for dysuria, frequency and hematuria.   Musculoskeletal:  Negative for arthralgias and back pain.   Integumentary:  Negative for rash.    Neurological:  Negative for dizziness, weakness, numbness and headaches.   Hematological:  Negative for adenopathy.   Psychiatric/Behavioral:  Negative for confusion.        Objective:     Assessment:   cT2, N1, Mx stage IIB pancreatic adenocarcinoma of the head of the pancreas.    Plan:   -Therapy education completed on 10/3/23.  -PET scan scheduled for 10/5/23.  -Mediport placement scheduled for 10/9/23. Lidocaine cream given with instructions to apply on port 30 minutes before treatment.   -Plans to start FOLFIRINOX on 10/10/23. Made patient aware that she will receive premedications given 30 minutes prior to each treatment to help prevent nausea. Patient understood that she will be going home with a 5FU pump for 2 days and will need to return to the clinic to have pump disconnected. Patient aware the importance of monitoring the pump 3 to 4 times a day to check for leaks or kinks. Patient also aware of the importance of avoiding cold drinks and cold food on the day of and 2 days after treatment with Oxaliplatin.     -Antiemetics regimen schedule made and given with calendar for guidance     Dexamethasone- Take 2 tablets by mouth on Days 2 and 3 of each chemo cycle   -Genetic counseling with Dr. Painting scheduled for 10/18/23  -Compazine PRN prescribed for at home with instructions to be taken by mouth every 6 hours as needed for nausea.  -OTC Imodium AD recommended for diarrhea (4-6 BMs a day). Take 2 tablets after the first loose bowel movement, and 1 tablet after each loose bowel movement after the first dose has been taken. No more than 4 tablets should be taken in any 24-hour period. If not working, Lomotil can be prescribed as 2nd choice.    -Mouth sore prevention with 1-quart warm water with 1 tsp of baking soda and salt and alcohol-free mouthwash.   -Emphasized adequate hand-hygiene and limited contact with people who are sick.   -Monitor and notify any bleeding in urine, stool, or sputum.  As well as  unusual bleeding or bruising and stomach pain.   - Emphasized hydration with 4 16 oz bottles of water a day.    -Importance of moisturizing with fragrance free lotion to prevent skin rash and/or hand-foot syndrome.  -Call clinic if fever >100.4, shakes or chills, shortness of breath, chest pain, uncontrolled vomiting or diarrhea, pain and tingling in the chest or arm, or just not feeling well.    -Plans to RTC on 10/23/23 for same day labs and toxicity check with JEWELS Ramey.    DISCUSSION:    1.  A total of 60 minutes were spent in counseling today, in which 100% were face-to-face.  At today's therapy teaching session, we discussed the patient's cancer diagnosis as well as planned therapy regimen, protocol, side effects and toxicities.  A handout of each therapeutic agent in the regimen was provided and reviewed in detail.    2.  The following side effects were discussed but not limited to:    Fluorouracil Side Effects:  Allergic Reactions  Breathing Problems  Bruising  Chest Pain  Chills  Cough  Diarrhea  Fever  Hair Loss  Headache  Infection  Itching  Low Blood Counts  Mouth Sores  Nausea  Numbness  Pain  Rash  Stomach Upset  Swelling  Tingling  Vomiting    Leucovorin (Injection) Side Effects:  Allergic Reactions  Breathing Problems  Itching  Rash  Swelling    Irinotecan Side Effects:  Allergic Reactions  Bruising  Chest Pain  Chills  Constipation  Cough  Diarrhea  Fever  Flushing  Hair Loss  Headache  Infection  Itching  Low Blood Counts  Mouth Sores  Nausea  Pain  Rash  Runny Nose  Swelling  Vomiting    Oxaliplatin Side Effects:  Allergic Reactions  Anemia  Breathing Problems  Bruising  Chest Pain  Chills  Cough  Diarrhea  Dizziness  Feeling Faint  Fever  Gas  Hair Loss  Infection  Injury  Itching  Low Blood Counts  Mouth Sores  Muscle Pain  Nausea  Numbness  Pain  Rash  Swelling  Tingling  Vomiting                a.  Discussed the risk of infection while on therapy related to pancytopenia, specifically a  decrease in their white blood cell count.  Instructed to contact our office for temperature >100.4 F, chills, sudden onset cough or shortness of breath, symptoms of a urinary tract infection.                b.  Discussed the risk of anemia. Instructed to contact our office for dizziness, heart palpitations, or extreme or sudden changes in weakness.                c.  Discussed the risk of thrombocytopenia, which increases the risk of bruising or bleeding.  Instructed the patient to contact our office for spontaneous signs of bleeding, including nose bleeds, bleeding from the gums or mouth, blood in sputum, urine or stool and unusual or excessive bruising or rash.                d.  Discussed GI side effects including weight changes, changes in appetite, altered sense of taste, stomatitis, nausea, vomiting, diarrhea, constipation, and heartburn.                e.  Discussed  side effects including painful urination, changes in the amount of urination, possible urine color changes.  Discussed fertility issues and to prevent  pregnancy if of child bearing age.                f.  Discussed neurological side effects including the risk of peripheral neuropathy, either temporary or permanent.                g.  Discussed the potential for skin, hair, and nail changes.       3.  Instructed to contact our office for discussion of medication changes, the addition of vitamin and/or herbal supplementation as they may interact with some chemotherapy agents.    4.  Discussed dietary modifications and the need to maintain adequate caloric intake and proper oral hydration.  Recommended 64 ounces of fluid per day.    5.  Discussed anti-emetic protocol and bowel regimen protocol.    6.  Office contact information given including after hours number.  Discussed there is an oncologist on call 24/7, 365 days including weekends.  Provided primary nurse's information .    7.  In summary, the patient is in agreement with the plan of care.   Questions appeared to be answered to their satisfaction. Consented the patient to the treatment plan and the patient was educated on the planned duration of the treatment and schedule of the treatment administration. Copy to be scanned into the chart.    All questions answered to the satisfaction of the patient and family.     Follow up appointments given to joshua.      MIL MIR-DERIC  PATIENT EDUCATOR  [facility]

## 2023-10-05 ENCOUNTER — PATIENT MESSAGE (OUTPATIENT)
Dept: HEMATOLOGY/ONCOLOGY | Facility: CLINIC | Age: 83
End: 2023-10-05
Payer: COMMERCIAL

## 2023-10-05 ENCOUNTER — HOSPITAL ENCOUNTER (OUTPATIENT)
Dept: RADIOLOGY | Facility: HOSPITAL | Age: 83
Discharge: HOME | End: 2023-10-05
Attending: INTERNAL MEDICINE
Payer: COMMERCIAL

## 2023-10-05 DIAGNOSIS — C25.9 PANCREATIC ADENOCARCINOMA: ICD-10-CM

## 2023-10-05 LAB — POCT GLUCOSE: 151 MG/DL (ref 70–110)

## 2023-10-05 PROCEDURE — A9552 F18 FDG: HCPCS

## 2023-10-06 DIAGNOSIS — R11.0 NAUSEA: ICD-10-CM

## 2023-10-06 DIAGNOSIS — C25.9 PANCREATIC ADENOCARCINOMA: Primary | ICD-10-CM

## 2023-10-06 RX ORDER — ONDANSETRON 8 MG/1
8 TABLET, ORALLY DISINTEGRATING ORAL EVERY 6 HOURS PRN
Qty: 60 TABLET | Refills: 1 | Status: SHIPPED | OUTPATIENT
Start: 2023-10-06

## 2023-10-09 ENCOUNTER — ANESTHESIA (OUTPATIENT)
Dept: SURGERY | Facility: HOSPITAL | Age: 83
End: 2023-10-09
Payer: COMMERCIAL

## 2023-10-09 ENCOUNTER — HOSPITAL ENCOUNTER (OUTPATIENT)
Facility: HOSPITAL | Age: 83
Discharge: HOME | End: 2023-10-09
Attending: SURGERY | Admitting: SURGERY
Payer: COMMERCIAL

## 2023-10-09 ENCOUNTER — ANESTHESIA EVENT (OUTPATIENT)
Dept: SURGERY | Facility: HOSPITAL | Age: 83
End: 2023-10-09
Payer: COMMERCIAL

## 2023-10-09 DIAGNOSIS — C25.9 PANCREATIC CANCER: ICD-10-CM

## 2023-10-09 DIAGNOSIS — C25.9 PANCREATIC ADENOCARCINOMA: ICD-10-CM

## 2023-10-09 LAB
POCT GLUCOSE: 126 MG/DL (ref 70–110)
POCT GLUCOSE: 149 MG/DL (ref 70–110)

## 2023-10-09 PROCEDURE — 36000709 HC OR TIME LEV III EA ADD 15 MIN: Performed by: SURGERY

## 2023-10-09 PROCEDURE — 63600175 PHARM REV CODE 636 W HCPCS: Performed by: SURGERY

## 2023-10-09 PROCEDURE — 36000708 HC OR TIME LEV III 1ST 15 MIN: Performed by: SURGERY

## 2023-10-09 PROCEDURE — D9220A PRA ANESTHESIA: ICD-10-PCS | Mod: ANES,,, | Performed by: ANESTHESIOLOGY

## 2023-10-09 PROCEDURE — 77001 FLUOROGUIDE FOR VEIN DEVICE: CPT | Mod: 26,,, | Performed by: SURGERY

## 2023-10-09 PROCEDURE — 63600175 PHARM REV CODE 636 W HCPCS

## 2023-10-09 PROCEDURE — 36561 INSERT TUNNELED CV CATH: CPT | Mod: RT,,, | Performed by: SURGERY

## 2023-10-09 PROCEDURE — D9220A PRA ANESTHESIA: ICD-10-PCS | Mod: CRNA,,,

## 2023-10-09 PROCEDURE — 76937 PR  US GUIDE, VASCULAR ACCESS: ICD-10-PCS | Mod: 26,,, | Performed by: SURGERY

## 2023-10-09 PROCEDURE — 37000008 HC ANESTHESIA 1ST 15 MINUTES: Performed by: SURGERY

## 2023-10-09 PROCEDURE — 71000033 HC RECOVERY, INTIAL HOUR: Performed by: SURGERY

## 2023-10-09 PROCEDURE — 71000015 HC POSTOP RECOV 1ST HR: Performed by: SURGERY

## 2023-10-09 PROCEDURE — 77001 CHG FLUOROGUIDE CNTRL VEN ACCESS,PLACE,REPLACE,REMOVE: ICD-10-PCS | Mod: 26,,, | Performed by: SURGERY

## 2023-10-09 PROCEDURE — 49320 PR LAP,DIAGNOSTIC ABDOMEN: ICD-10-PCS | Mod: 58,XS,, | Performed by: SURGERY

## 2023-10-09 PROCEDURE — D9220A PRA ANESTHESIA: Mod: CRNA,,,

## 2023-10-09 PROCEDURE — 63600175 PHARM REV CODE 636 W HCPCS: Performed by: ANESTHESIOLOGY

## 2023-10-09 PROCEDURE — 76937 US GUIDE VASCULAR ACCESS: CPT | Mod: 26,,, | Performed by: SURGERY

## 2023-10-09 PROCEDURE — 36561 PR INSERT TUNNELED CV CATH WITH PORT: ICD-10-PCS | Mod: RT,,, | Performed by: SURGERY

## 2023-10-09 PROCEDURE — 25000003 PHARM REV CODE 250

## 2023-10-09 PROCEDURE — 37000009 HC ANESTHESIA EA ADD 15 MINS: Performed by: SURGERY

## 2023-10-09 PROCEDURE — D9220A PRA ANESTHESIA: Mod: ANES,,, | Performed by: ANESTHESIOLOGY

## 2023-10-09 PROCEDURE — 25000003 PHARM REV CODE 250: Performed by: ANESTHESIOLOGY

## 2023-10-09 PROCEDURE — 49320 DIAG LAPARO SEPARATE PROC: CPT | Mod: 58,XS,, | Performed by: SURGERY

## 2023-10-09 PROCEDURE — C1788 PORT, INDWELLING, IMP: HCPCS | Performed by: SURGERY

## 2023-10-09 DEVICE — PORT POWER CLEAR VIEW: Type: IMPLANTABLE DEVICE | Site: CHEST | Status: FUNCTIONAL

## 2023-10-09 RX ORDER — ROCURONIUM BROMIDE 10 MG/ML
INJECTION, SOLUTION INTRAVENOUS
Status: DISCONTINUED | OUTPATIENT
Start: 2023-10-09 | End: 2023-10-09

## 2023-10-09 RX ORDER — ACETAMINOPHEN 500 MG
1000 TABLET ORAL ONCE
Status: COMPLETED | OUTPATIENT
Start: 2023-10-09 | End: 2023-10-09

## 2023-10-09 RX ORDER — SODIUM CHLORIDE 0.9 % (FLUSH) 0.9 %
10 SYRINGE (ML) INJECTION
Status: CANCELLED | OUTPATIENT
Start: 2023-10-12

## 2023-10-09 RX ORDER — ONDANSETRON HYDROCHLORIDE 2 MG/ML
INJECTION, SOLUTION INTRAMUSCULAR; INTRAVENOUS
Status: DISCONTINUED | OUTPATIENT
Start: 2023-10-09 | End: 2023-10-09

## 2023-10-09 RX ORDER — MEPERIDINE HYDROCHLORIDE 25 MG/ML
12.5 INJECTION INTRAMUSCULAR; INTRAVENOUS; SUBCUTANEOUS ONCE
Status: DISCONTINUED | OUTPATIENT
Start: 2023-10-09 | End: 2023-10-09 | Stop reason: HOSPADM

## 2023-10-09 RX ORDER — METOCLOPRAMIDE HYDROCHLORIDE 5 MG/ML
10 INJECTION INTRAMUSCULAR; INTRAVENOUS ONCE
Status: COMPLETED | OUTPATIENT
Start: 2023-10-09 | End: 2023-10-09

## 2023-10-09 RX ORDER — LIDOCAINE HYDROCHLORIDE 10 MG/ML
INJECTION, SOLUTION EPIDURAL; INFILTRATION; INTRACAUDAL; PERINEURAL
Status: DISCONTINUED | OUTPATIENT
Start: 2023-10-09 | End: 2023-10-09

## 2023-10-09 RX ORDER — FLUOROURACIL 50 MG/ML
2400 INJECTION, SOLUTION INTRAVENOUS
Status: CANCELLED | OUTPATIENT
Start: 2023-10-10

## 2023-10-09 RX ORDER — FAMOTIDINE 10 MG/ML
20 INJECTION INTRAVENOUS ONCE
Status: COMPLETED | OUTPATIENT
Start: 2023-10-09 | End: 2023-10-09

## 2023-10-09 RX ORDER — IPRATROPIUM BROMIDE AND ALBUTEROL SULFATE 2.5; .5 MG/3ML; MG/3ML
3 SOLUTION RESPIRATORY (INHALATION) ONCE AS NEEDED
Status: DISCONTINUED | OUTPATIENT
Start: 2023-10-09 | End: 2023-10-09 | Stop reason: HOSPADM

## 2023-10-09 RX ORDER — EPINEPHRINE 0.3 MG/.3ML
0.3 INJECTION SUBCUTANEOUS ONCE AS NEEDED
Status: CANCELLED | OUTPATIENT
Start: 2023-10-10

## 2023-10-09 RX ORDER — DIPHENHYDRAMINE HYDROCHLORIDE 50 MG/ML
50 INJECTION INTRAMUSCULAR; INTRAVENOUS ONCE AS NEEDED
Status: CANCELLED | OUTPATIENT
Start: 2023-10-10

## 2023-10-09 RX ORDER — CEFAZOLIN SODIUM 1 G/3ML
INJECTION, POWDER, FOR SOLUTION INTRAMUSCULAR; INTRAVENOUS
Status: DISCONTINUED | OUTPATIENT
Start: 2023-10-09 | End: 2023-10-09 | Stop reason: HOSPADM

## 2023-10-09 RX ORDER — ONDANSETRON 2 MG/ML
4 INJECTION INTRAMUSCULAR; INTRAVENOUS ONCE
Status: DISCONTINUED | OUTPATIENT
Start: 2023-10-09 | End: 2023-10-09 | Stop reason: HOSPADM

## 2023-10-09 RX ORDER — FENTANYL CITRATE 50 UG/ML
INJECTION, SOLUTION INTRAMUSCULAR; INTRAVENOUS
Status: DISCONTINUED | OUTPATIENT
Start: 2023-10-09 | End: 2023-10-09

## 2023-10-09 RX ORDER — DIPHENHYDRAMINE HYDROCHLORIDE 50 MG/ML
25 INJECTION INTRAMUSCULAR; INTRAVENOUS EVERY 6 HOURS PRN
Status: DISCONTINUED | OUTPATIENT
Start: 2023-10-09 | End: 2023-10-09 | Stop reason: HOSPADM

## 2023-10-09 RX ORDER — BUPIVACAINE HYDROCHLORIDE 5 MG/ML
INJECTION, SOLUTION EPIDURAL; INTRACAUDAL
Status: DISCONTINUED
Start: 2023-10-09 | End: 2023-10-09 | Stop reason: HOSPADM

## 2023-10-09 RX ORDER — ENOXAPARIN SODIUM 100 MG/ML
30 INJECTION SUBCUTANEOUS
Status: DISCONTINUED | OUTPATIENT
Start: 2023-10-09 | End: 2023-10-09 | Stop reason: HOSPADM

## 2023-10-09 RX ORDER — MIDAZOLAM HYDROCHLORIDE 1 MG/ML
2 INJECTION INTRAMUSCULAR; INTRAVENOUS ONCE AS NEEDED
Status: DISCONTINUED | OUTPATIENT
Start: 2023-10-09 | End: 2023-10-09 | Stop reason: HOSPADM

## 2023-10-09 RX ORDER — SODIUM CHLORIDE 9 MG/ML
INJECTION, SOLUTION INTRAVENOUS CONTINUOUS
Status: DISCONTINUED | OUTPATIENT
Start: 2023-10-09 | End: 2023-10-09 | Stop reason: HOSPADM

## 2023-10-09 RX ORDER — SODIUM CHLORIDE 9 MG/ML
INJECTION, SOLUTION INTRAMUSCULAR; INTRAVENOUS; SUBCUTANEOUS
Status: DISCONTINUED
Start: 2023-10-09 | End: 2023-10-09 | Stop reason: HOSPADM

## 2023-10-09 RX ORDER — SODIUM CHLORIDE 0.9 % (FLUSH) 0.9 %
10 SYRINGE (ML) INJECTION
Status: CANCELLED | OUTPATIENT
Start: 2023-10-10

## 2023-10-09 RX ORDER — METOCLOPRAMIDE HYDROCHLORIDE 5 MG/ML
10 INJECTION INTRAMUSCULAR; INTRAVENOUS EVERY 10 MIN PRN
Status: DISCONTINUED | OUTPATIENT
Start: 2023-10-09 | End: 2023-10-09 | Stop reason: HOSPADM

## 2023-10-09 RX ORDER — PROPOFOL 10 MG/ML
VIAL (ML) INTRAVENOUS
Status: DISCONTINUED | OUTPATIENT
Start: 2023-10-09 | End: 2023-10-09

## 2023-10-09 RX ORDER — HEPARIN SODIUM 5000 [USP'U]/ML
INJECTION, SOLUTION INTRAVENOUS; SUBCUTANEOUS
Status: DISCONTINUED | OUTPATIENT
Start: 2023-10-09 | End: 2023-10-09 | Stop reason: HOSPADM

## 2023-10-09 RX ORDER — HEPARIN SODIUM 5000 [USP'U]/ML
INJECTION, SOLUTION INTRAVENOUS; SUBCUTANEOUS
Status: DISCONTINUED
Start: 2023-10-09 | End: 2023-10-09 | Stop reason: HOSPADM

## 2023-10-09 RX ORDER — PHENYLEPHRINE HYDROCHLORIDE 10 MG/ML
INJECTION INTRAVENOUS
Status: DISCONTINUED | OUTPATIENT
Start: 2023-10-09 | End: 2023-10-09

## 2023-10-09 RX ORDER — HYDROCODONE BITARTRATE AND ACETAMINOPHEN 5; 325 MG/1; MG/1
1 TABLET ORAL
Status: DISCONTINUED | OUTPATIENT
Start: 2023-10-09 | End: 2023-10-09 | Stop reason: HOSPADM

## 2023-10-09 RX ORDER — ATROPINE SULFATE 0.4 MG/ML
0.4 INJECTION, SOLUTION ENDOTRACHEAL; INTRAMEDULLARY; INTRAMUSCULAR; INTRAVENOUS; SUBCUTANEOUS ONCE AS NEEDED
Status: CANCELLED | OUTPATIENT
Start: 2023-10-10

## 2023-10-09 RX ORDER — HEPARIN 100 UNIT/ML
500 SYRINGE INTRAVENOUS
Status: CANCELLED | OUTPATIENT
Start: 2023-10-10

## 2023-10-09 RX ORDER — SODIUM CHLORIDE, SODIUM LACTATE, POTASSIUM CHLORIDE, CALCIUM CHLORIDE 600; 310; 30; 20 MG/100ML; MG/100ML; MG/100ML; MG/100ML
INJECTION, SOLUTION INTRAVENOUS CONTINUOUS
Status: DISCONTINUED | OUTPATIENT
Start: 2023-10-09 | End: 2023-10-09 | Stop reason: HOSPADM

## 2023-10-09 RX ORDER — ENOXAPARIN SODIUM 100 MG/ML
30 INJECTION SUBCUTANEOUS EVERY 24 HOURS
Status: DISCONTINUED | OUTPATIENT
Start: 2023-10-09 | End: 2023-10-09

## 2023-10-09 RX ORDER — CEFAZOLIN SODIUM 1 G/3ML
INJECTION, POWDER, FOR SOLUTION INTRAMUSCULAR; INTRAVENOUS
Status: DISCONTINUED
Start: 2023-10-09 | End: 2023-10-09 | Stop reason: HOSPADM

## 2023-10-09 RX ORDER — PROCHLORPERAZINE EDISYLATE 5 MG/ML
5 INJECTION INTRAMUSCULAR; INTRAVENOUS ONCE AS NEEDED
Status: CANCELLED | OUTPATIENT
Start: 2023-10-10

## 2023-10-09 RX ORDER — CEFAZOLIN SODIUM 2 G/50ML
2 SOLUTION INTRAVENOUS
Status: COMPLETED | OUTPATIENT
Start: 2023-10-09 | End: 2023-10-09

## 2023-10-09 RX ORDER — HYDROMORPHONE HYDROCHLORIDE 2 MG/ML
0.4 INJECTION, SOLUTION INTRAMUSCULAR; INTRAVENOUS; SUBCUTANEOUS EVERY 5 MIN PRN
Status: DISCONTINUED | OUTPATIENT
Start: 2023-10-09 | End: 2023-10-09 | Stop reason: HOSPADM

## 2023-10-09 RX ORDER — HEPARIN 100 UNIT/ML
500 SYRINGE INTRAVENOUS
Status: CANCELLED | OUTPATIENT
Start: 2023-10-12

## 2023-10-09 RX ORDER — BUPIVACAINE HYDROCHLORIDE 5 MG/ML
INJECTION, SOLUTION EPIDURAL; INTRACAUDAL
Status: DISCONTINUED | OUTPATIENT
Start: 2023-10-09 | End: 2023-10-09 | Stop reason: HOSPADM

## 2023-10-09 RX ORDER — LIDOCAINE HYDROCHLORIDE 10 MG/ML
1 INJECTION, SOLUTION EPIDURAL; INFILTRATION; INTRACAUDAL; PERINEURAL ONCE
Status: DISCONTINUED | OUTPATIENT
Start: 2023-10-09 | End: 2023-10-09 | Stop reason: HOSPADM

## 2023-10-09 RX ADMIN — ONDANSETRON 4 MG: 2 INJECTION INTRAMUSCULAR; INTRAVENOUS at 01:10

## 2023-10-09 RX ADMIN — FAMOTIDINE 20 MG: 10 INJECTION, SOLUTION INTRAVENOUS at 10:10

## 2023-10-09 RX ADMIN — SODIUM CHLORIDE, POTASSIUM CHLORIDE, SODIUM LACTATE AND CALCIUM CHLORIDE: 600; 310; 30; 20 INJECTION, SOLUTION INTRAVENOUS at 03:10

## 2023-10-09 RX ADMIN — ACETAMINOPHEN 1000 MG: 500 TABLET ORAL at 10:10

## 2023-10-09 RX ADMIN — Medication 100 MG: at 12:10

## 2023-10-09 RX ADMIN — ENOXAPARIN SODIUM 30 MG: 30 INJECTION SUBCUTANEOUS at 12:10

## 2023-10-09 RX ADMIN — SODIUM CHLORIDE, POTASSIUM CHLORIDE, SODIUM LACTATE AND CALCIUM CHLORIDE: 600; 310; 30; 20 INJECTION, SOLUTION INTRAVENOUS at 12:10

## 2023-10-09 RX ADMIN — CEFAZOLIN SODIUM 2 G: 2 SOLUTION INTRAVENOUS at 12:10

## 2023-10-09 RX ADMIN — SODIUM CHLORIDE, POTASSIUM CHLORIDE, SODIUM LACTATE AND CALCIUM CHLORIDE: 600; 310; 30; 20 INJECTION, SOLUTION INTRAVENOUS at 10:10

## 2023-10-09 RX ADMIN — FENTANYL CITRATE 50 MCG: 50 INJECTION, SOLUTION INTRAMUSCULAR; INTRAVENOUS at 12:10

## 2023-10-09 RX ADMIN — METOCLOPRAMIDE 10 MG: 5 INJECTION, SOLUTION INTRAMUSCULAR; INTRAVENOUS at 10:10

## 2023-10-09 RX ADMIN — ROCURONIUM BROMIDE 40 MG: 50 INJECTION INTRAVENOUS at 12:10

## 2023-10-09 RX ADMIN — LIDOCAINE HYDROCHLORIDE 50 MG: 10 INJECTION, SOLUTION EPIDURAL; INFILTRATION; INTRACAUDAL; PERINEURAL at 12:10

## 2023-10-09 RX ADMIN — PHENYLEPHRINE HYDROCHLORIDE 200 MCG: 10 INJECTION INTRAVENOUS at 01:10

## 2023-10-09 NOTE — DISCHARGE INSTRUCTIONS
Mediport/Portacath Discharge Instructions     About this topic   A portacath or port is a type of central line. A central line catheter is a very long intravenous (IV) line. A port has two main parts: A round disc and long tube called a catheter. The port looks like a small bump under your skin. It is most often in the chest area, but may be in your upper arm or lower abdomen. The long catheter tube goes under the skin and into a large vein near your heart. Once you have a port, you do not need to have IV needles stuck into your arms to have lab tests drawn or fluids given.    Once this port is in place, the doctor or nurse will use a special needle to get into the disc part. This is called accessing the port. The needle can stay in for a week at a time and will be covered with a clear bandage and taped in place. When the needle is not in the port, you will not have any holes in your skin because the port is under the skin. It can stay in place for months or years until your treatment is no longer needed. Sometimes, this kind of IV is used to draw blood for tests.  Your doctor can give you any kind of fluids, drugs, or blood products through your port. They will not bother your veins because the port goes into such a large vein in your body. You can also have all kinds of blood draws through your port. If your port is not accessed, you will just see a small bump under your skin. There will be nothing that is sticking out from your skin.           When you are at home, there may be times when your port is still accessed. This means it still has a needle in place.  Do not get your port wet while the needle is in place. Take a sponge bath or cover the area with plastic wrap before you shower.  Make sure your dressing stays clean and dry. This will help keep the needle secure and in place.  If your port is not accessed, it will just look like a small raised area under the skin.  You can shower.  You may take a bath or  swim in a pool (once incision is healed).  You do not need to have a dressing over your port.  Wear clothes that do not rub on your port.  What care is needed at home?   You can take the bandage off in 24 hours.  Wash your hands before and after touching your dressing and incision. You may take a shower after removing the bandage. Wash your incision with soap and water, pat dry, then leave open to air. The skin glue (dermabond) will peel off within 1-2 weeks. Do not peel or pick at it.  Check your incision daily for signs and symptoms of infection: redness, drainage or pus, foul odor, warmth, and swelling.   No lifting anything over 10 pounds (4.5 kg) until the doctor say otherwise.  Gradually go back to your normal activities like work, driving, or sex.  You may use an ice pack for comfort and swelling. Place ice pack over incision with a towel between your skin and the ice pack. Leave on 15 minutes and take off. Repeat 3-4 times during the day.   How to care for your port.  Wash your hands with soap and water for at least 30 seconds before touching the needle or port. Do not allow anyone to touch your needle or port with dirty hands or if they have not had special training.  If there is no needle in it, the port will be fully under your skin and you will be able to do your normal activities.  Your port will need to be flushed so a clot does not form at the end. A nurse will flush the port with saline and then heparin to avoid having a clot form inside or at the end of the tube. The nurse will do this each time you are finished getting fluid or drugs, and when the it's time to pull the needle out of your skin. If you are allergic to heparin or had a bad side effect from it, talk to your doctor.  You will get an ID card that will tell other people that you have an implanted port. It is very important to keep this card with you at all times to let doctors and nurses know what kind of port you have.  Will physical  activity be limited?   You may have to limit your activity when you have a port. Avoid activities that make you tired. Avoid sports where you might get hit in the port. Talk to your doctor about the right amount of activity for you.  Avoid activities that pull on your arms or chest.  No pulling with arm on the side of the mediport and not picking up heavy objects with the arm on the side of the port.  What problems could happen?   Infection  Bruising  Blood clot  Scar inside the large blood vessel if the port is in for a long time  When do I need to call the doctor?   Signs of infection. These include a fever of 100.4°F (38°C) or higher, chills.  Signs of wound infection. These include swelling, redness, warmth around the wound; too much pain when touched; yellowish, greenish, or bloody discharge; foul smell coming from the cut site; cut site opens up.  Pain, irritation, discomfort, or swelling at the port site or other areas of your chest when using the port.  If you hear a rushing sound in your ear when you flush your port.  You have concerns about your port.     Patient Education  Surgical Wound Discharge Instructions     What care is needed at home?   Any cut made on the skin gives germs easy access to cause an infection. A wound infection can start from 2 days to 3 weeks after surgery. The infection may spread deeper in the body if it is not treated. You may start to feel unwell and serious health problems could happen. An infection may also cause the cut to open up again. These things may help you prevent an infection:  Wash your hands before and after touching your cut site. Use soap and water for at least 20 seconds. Alcohol-based hand sanitizers also work to kill germs.  Keep your wound clean and dry for the first 24 hours. You can take a shower after 24 hours or when your doctor tells you to. You may use soap and water to wash your wound. Make sure not to soak it. Gently towel-dry the wound afterwards.  Your doctor may also use skin glue to close your wound. Do not take the glue off. It will peel off within 7-14 days.   Take the medications your doctor may prescribe you as ordered.  What follow-up care is needed?   Be sure to keep your follow-up appointment.  What lifestyle changes are needed?   Stop or lessen smoking.  Get lots of rest. Sleep when you are feeling tired. Avoid doing tiring activities.  Follow a healthy diet.  Eat many different foods rich in nutrients from all food groups.   Stay away from sugars and fats. Limit sweets and fatty foods such as desserts, fried foods, and chips. Use good fats found in fish, nuts, avocados, and oils, like olive oil and canola oil. Cut back on solid fats (butter, lard, margarine).  Try to eat more low fat or lean meats. Eat less red meat and eat more fish, chicken, turkey, and beans instead.  Drink 6 to 8 cups (1440 to 1920 mL) of water each day unless your doctor has told you to limit your fluids.  Avoid swimming and hot tubs until incision is healed.  What problems could happen?   Infection of the whole body. This is sepsis.  Poorly healed wound may leave big scars.  The wound may spontaneously open or break away from the stitches or staples. This is wound dehiscence.   When do I need to call the doctor?   Signs of a very bad reaction. These include wheezing; chest tightness; fever; itching; bad cough; blue skin color; seizures; or swelling of face, lips, tongue, or throat. Go to the ER right away.  Signs of infection. These include a fever of 100.4°F (38°C) or higher, chills, wound that will not heal, pain.  Signs of wound infection. These include swelling, redness, warmth around the wound; too much pain when touched; yellowish, greenish, or bloody discharge; foul smell coming from the cut site; cut site opens up.  Helpful tips   Wear loose clothing. Good blood supply is important for healing. You may also be more comfortable.  Do not remove your bandages unless your  doctor says so.  If your wound suddenly bleeds, apply pressure to help stop the bleeding. See you doctor right away.  If you have high blood sugar, work with your doctor to keep it in a normal range.

## 2023-10-09 NOTE — TRANSFER OF CARE
Anesthesia Transfer of Care Note    Patient: Darby Gary    Procedure(s) Performed: Procedure(s) (LRB):  LAPAROSCOPY, DIAGNOSTIC  Req 12/12:30 (N/A)  PLACEMENT, MEDIPORT (Right)    Patient location: PACU    Anesthesia Type: general    Transport from OR: Transported from OR on room air with adequate spontaneous ventilation. Transported from OR on 6-10 L/min O2 by face mask with adequate spontaneous ventilation    Post pain: adequate analgesia    Post assessment: no apparent anesthetic complications and tolerated procedure well    Post vital signs: stable    Level of consciousness: responds to stimulation    Nausea/Vomiting: no nausea/vomiting    Complications: none    Transfer of care protocol was followed

## 2023-10-09 NOTE — ANESTHESIA PROCEDURE NOTES
Intubation    Date/Time: 10/9/2023 12:59 PM    Performed by: Giselle Gutierrez CRNA  Authorized by: Amauri Khalil DO    Intubation:     Induction:  Intravenous    Intubated:  Postinduction    Mask Ventilation:  Easy with oral airway    Attempts:  1    Attempted By:  CRNA    Method of Intubation:  Direct    Blade:  Flores 2    Laryngeal View Grade: Grade I - full view of cords      Difficult Airway Encountered?: No      Complications:  None    Airway Device:  Oral endotracheal tube    Airway Device Size:  7.0    Style/Cuff Inflation:  Cuffed (inflated to minimal occlusive pressure)    Inflation Amount (mL):  6    Tube secured:  21    Secured at:  The lips    Placement Verified By:  Capnometry    Complicating Factors:  None    Findings Post-Intubation:  BS equal bilateral

## 2023-10-09 NOTE — ANESTHESIA POSTPROCEDURE EVALUATION
Anesthesia Post Evaluation    Patient: Darby Gary    Procedure(s) Performed: Procedure(s) (LRB):  LAPAROSCOPY, DIAGNOSTIC  Req 12/12:30 (N/A)  PLACEMENT, MEDIPORT (Right)    Final Anesthesia Type: general      Patient location during evaluation: PACU  Patient participation: Yes- Able to Participate  Level of consciousness: awake and alert  Post-procedure vital signs: reviewed and stable  Pain management: adequate  Airway patency: patent  JESÚS mitigation strategies: Multimodal analgesia  PONV status at discharge: No PONV  Anesthetic complications: no      Cardiovascular status: hemodynamically stable  Respiratory status: unassisted  Hydration status: euvolemic  Follow-up not needed.          Vitals Value Taken Time   /78 10/09/23 1429   Temp 36.6 °C (97.9 °F) 10/09/23 1420   Pulse 72 10/09/23 1429   Resp 16 10/09/23 1420   SpO2 93 % 10/09/23 1429         No case tracking events are documented in the log.      Pain/Hoda Score: Pain Rating Prior to Med Admin: 0 (10/9/2023 10:07 AM)  Hoda Score: 9 (10/9/2023  2:10 PM)

## 2023-10-09 NOTE — ANESTHESIA PREPROCEDURE EVALUATION
10/09/2023  Darby Gary is a 83 y.o., female presents for diagnostic laparoscopy.    Other Medical History   Hypertension Hyperlipidemia   Arthritis Osteoporosis   Pancreatic cancer      Surgical History    COLONOSCOPY ERCP   ESOPHAGOGASTRODUODENOSCOPY ERCP W/ SPHICTEROTOMY   ERCP, WITH STENT INSERTION        Pre-op Assessment    I have reviewed the Patient Summary Reports.     I have reviewed the Nursing Notes. I have reviewed the NPO Status.   I have reviewed the Medications.     Review of Systems  Anesthesia Hx:  No problems with previous Anesthesia    Social:  Former Smoker    Hematology/Oncology:        Current/Recent Cancer. Other (see Oncology comments) Oncology Comments: Pancreatic Adenocarcinoma   Cardiovascular:   Hypertension    Endocrine:   Diabetes        Physical Exam  General: Well nourished, Cooperative, Alert and Oriented    Airway:  Mallampati: III   Mouth Opening: Normal  TM Distance: Normal  Tongue: Normal  Neck ROM: Normal ROM    Dental:  Dentures    Chest/Lungs:  Clear to auscultation, Normal Respiratory Rate    Heart:  Rate: Normal  Rhythm: Regular Rhythm  Sounds: Normal    Abdomen:  Normal, Soft, Nontender        Anesthesia Plan  Type of Anesthesia, risks & benefits discussed:    Anesthesia Type: Gen ETT  Intra-op Monitoring Plan: Standard ASA Monitors  Post Op Pain Control Plan: multimodal analgesia  Induction:  IV  Airway Plan: Direct  Informed Consent: Informed consent signed with the Patient and all parties understand the risks and agree with anesthesia plan.  All questions answered.   ASA Score: 4  Day of Surgery Review of History & Physical: H&P Update referred to the surgeon/provider.    Ready For Surgery From Anesthesia Perspective.     .

## 2023-10-09 NOTE — PLAN OF CARE
Pt. Resting comfortably, VSS, no s/s distress, Hoda at acceptable level for transfer to phase II, Criteria met for anesthesia release per Dr. Khalil.

## 2023-10-10 ENCOUNTER — INFUSION (OUTPATIENT)
Dept: INFUSION THERAPY | Facility: HOSPITAL | Age: 83
End: 2023-10-10
Attending: INTERNAL MEDICINE
Payer: COMMERCIAL

## 2023-10-10 VITALS
DIASTOLIC BLOOD PRESSURE: 73 MMHG | WEIGHT: 158.94 LBS | OXYGEN SATURATION: 97 % | BODY MASS INDEX: 29.25 KG/M2 | HEART RATE: 68 BPM | SYSTOLIC BLOOD PRESSURE: 121 MMHG | HEIGHT: 62 IN | TEMPERATURE: 98 F | RESPIRATION RATE: 18 BRPM

## 2023-10-10 VITALS
HEART RATE: 67 BPM | BODY MASS INDEX: 29.25 KG/M2 | DIASTOLIC BLOOD PRESSURE: 85 MMHG | SYSTOLIC BLOOD PRESSURE: 146 MMHG | WEIGHT: 158.94 LBS | TEMPERATURE: 98 F | RESPIRATION RATE: 18 BRPM | HEIGHT: 62 IN | OXYGEN SATURATION: 99 %

## 2023-10-10 DIAGNOSIS — C25.9 PANCREATIC ADENOCARCINOMA: Primary | ICD-10-CM

## 2023-10-10 PROCEDURE — 96375 TX/PRO/DX INJ NEW DRUG ADDON: CPT

## 2023-10-10 PROCEDURE — 96417 CHEMO IV INFUS EACH ADDL SEQ: CPT

## 2023-10-10 PROCEDURE — 25000003 PHARM REV CODE 250: Performed by: INTERNAL MEDICINE

## 2023-10-10 PROCEDURE — 96368 THER/DIAG CONCURRENT INF: CPT

## 2023-10-10 PROCEDURE — 96413 CHEMO IV INFUSION 1 HR: CPT

## 2023-10-10 PROCEDURE — 96416 CHEMO PROLONG INFUSE W/PUMP: CPT

## 2023-10-10 PROCEDURE — 96415 CHEMO IV INFUSION ADDL HR: CPT

## 2023-10-10 PROCEDURE — 96367 TX/PROPH/DG ADDL SEQ IV INF: CPT

## 2023-10-10 PROCEDURE — 63600175 PHARM REV CODE 636 W HCPCS: Performed by: INTERNAL MEDICINE

## 2023-10-10 RX ORDER — PROCHLORPERAZINE EDISYLATE 5 MG/ML
5 INJECTION INTRAMUSCULAR; INTRAVENOUS ONCE AS NEEDED
Status: DISCONTINUED | OUTPATIENT
Start: 2023-10-10 | End: 2023-10-10 | Stop reason: HOSPADM

## 2023-10-10 RX ORDER — SODIUM CHLORIDE 0.9 % (FLUSH) 0.9 %
10 SYRINGE (ML) INJECTION
Status: DISCONTINUED | OUTPATIENT
Start: 2023-10-10 | End: 2023-10-10 | Stop reason: HOSPADM

## 2023-10-10 RX ORDER — EPINEPHRINE 0.3 MG/.3ML
0.3 INJECTION SUBCUTANEOUS ONCE AS NEEDED
Status: DISCONTINUED | OUTPATIENT
Start: 2023-10-10 | End: 2023-10-10 | Stop reason: HOSPADM

## 2023-10-10 RX ORDER — DIPHENHYDRAMINE HYDROCHLORIDE 50 MG/ML
50 INJECTION INTRAMUSCULAR; INTRAVENOUS ONCE AS NEEDED
Status: DISCONTINUED | OUTPATIENT
Start: 2023-10-10 | End: 2023-10-10 | Stop reason: HOSPADM

## 2023-10-10 RX ORDER — ATROPINE SULFATE 0.4 MG/ML
0.4 INJECTION, SOLUTION ENDOTRACHEAL; INTRAMEDULLARY; INTRAMUSCULAR; INTRAVENOUS; SUBCUTANEOUS ONCE AS NEEDED
Status: COMPLETED | OUTPATIENT
Start: 2023-10-10 | End: 2023-10-10

## 2023-10-10 RX ORDER — HEPARIN 100 UNIT/ML
500 SYRINGE INTRAVENOUS
Status: DISCONTINUED | OUTPATIENT
Start: 2023-10-10 | End: 2023-10-10 | Stop reason: HOSPADM

## 2023-10-10 RX ADMIN — PALONOSETRON 0.25 MG: 0.25 INJECTION, SOLUTION INTRAVENOUS at 10:10

## 2023-10-10 RX ADMIN — FLUOROURACIL 4250 MG: 50 INJECTION, SOLUTION INTRAVENOUS at 02:10

## 2023-10-10 RX ADMIN — SODIUM CHLORIDE: 9 INJECTION, SOLUTION INTRAVENOUS at 01:10

## 2023-10-10 RX ADMIN — DEXTROSE 700 MG: 5 SOLUTION INTRAVENOUS at 01:10

## 2023-10-10 RX ADMIN — OXALIPLATIN 150 MG: 50 INJECTION, SOLUTION, CONCENTRATE INTRAVENOUS at 10:10

## 2023-10-10 RX ADMIN — DEXTROSE MONOHYDRATE: 50 INJECTION, SOLUTION INTRAVENOUS at 10:10

## 2023-10-10 RX ADMIN — ATROPINE SULFATE 0.4 MG: 0.4 INJECTION, SOLUTION INTRAVENOUS at 01:10

## 2023-10-10 RX ADMIN — IRINOTECAN HYDROCHLORIDE 260 MG: 20 INJECTION, SOLUTION INTRAVENOUS at 01:10

## 2023-10-10 NOTE — PLAN OF CARE
C1 D1 Folfirinox given. Tolerated well. Pump started at  1445. Instructed to return on Thursday 10/12 at 1230. Dc'd home in stable condition with pump infusing.

## 2023-10-10 NOTE — DISCHARGE SUMMARY
[facility] Surgical - Periop Services  Discharge Note  Short Stay    Procedure(s) (LRB):  LAPAROSCOPY, DIAGNOSTIC  Req 12/12:30 (N/A)  PLACEMENT, MEDIPORT (Right)      OUTCOME: Patient tolerated treatment/procedure well without complication and is now ready for discharge.    DISPOSITION: Home or Self Care    FINAL DIAGNOSIS:  <principal problem not specified>    FOLLOWUP: In clinic    DISCHARGE INSTRUCTIONS:  No discharge procedures on file.     TIME SPENT ON DISCHARGE:  minutes

## 2023-10-12 ENCOUNTER — INFUSION (OUTPATIENT)
Dept: INFUSION THERAPY | Facility: HOSPITAL | Age: 83
End: 2023-10-12
Attending: INTERNAL MEDICINE
Payer: COMMERCIAL

## 2023-10-12 VITALS
SYSTOLIC BLOOD PRESSURE: 124 MMHG | DIASTOLIC BLOOD PRESSURE: 64 MMHG | HEART RATE: 76 BPM | OXYGEN SATURATION: 96 % | TEMPERATURE: 98 F | RESPIRATION RATE: 18 BRPM

## 2023-10-12 DIAGNOSIS — C25.9 PANCREATIC ADENOCARCINOMA: Primary | ICD-10-CM

## 2023-10-12 PROCEDURE — 63600175 PHARM REV CODE 636 W HCPCS: Performed by: INTERNAL MEDICINE

## 2023-10-12 PROCEDURE — 25000003 PHARM REV CODE 250: Performed by: INTERNAL MEDICINE

## 2023-10-12 PROCEDURE — 96372 THER/PROPH/DIAG INJ SC/IM: CPT

## 2023-10-12 PROCEDURE — A4216 STERILE WATER/SALINE, 10 ML: HCPCS | Performed by: INTERNAL MEDICINE

## 2023-10-12 RX ORDER — HEPARIN 100 UNIT/ML
500 SYRINGE INTRAVENOUS
Status: DISCONTINUED | OUTPATIENT
Start: 2023-10-12 | End: 2023-10-12 | Stop reason: HOSPADM

## 2023-10-12 RX ORDER — SODIUM CHLORIDE 0.9 % (FLUSH) 0.9 %
10 SYRINGE (ML) INJECTION
Status: DISCONTINUED | OUTPATIENT
Start: 2023-10-12 | End: 2023-10-12 | Stop reason: HOSPADM

## 2023-10-12 RX ADMIN — HEPARIN 500 UNITS: 100 SYRINGE at 12:10

## 2023-10-12 RX ADMIN — PEGFILGRASTIM-CBQV 6 MG: 6 INJECTION, SOLUTION SUBCUTANEOUS at 01:10

## 2023-10-12 RX ADMIN — Medication 10 ML: at 12:10

## 2023-10-12 NOTE — PLAN OF CARE
C1 D3 DC Pump and Udenyca given. Tolerated well. Next appts given to pt. Dc'd home in stable condition.

## 2023-10-13 NOTE — OP NOTE
Date of Surgery: 10/9/23    Surgeon:  Chucky Olguin MD                                      Pre-op Diagnosis:  Pancreatic adenocarcinoma    Post-op Diagnosis:  Same    Procedure:    Right internal jugular Mediport insertion with ultrasound and fluoroscopic guidance  Diagnostic/staging laparoscopy    Anesthesia:  Local/MAC    EBL:  Less than 15 cc    Specimens:  None    Findings:  Diagnostic staging laparoscopy revealed no evidence of occult metastatic disease that would preclude an aggressive approach to her primary tumor.  PowerPort placed without difficulty, final fluoroscopy revealed the tip of the catheter within the superior vena cava, no evidence of pneumothorax or other complication.  The port flushed and aspirated freely.    Procedure in detail: After informed consent was obtained the patient was brought to the operating room and placed in the supine position.  Sedation was administered by anesthesia.  The abdomen upper chest and neck were prepped and draped in sterile fashion.  After infiltration with local anesthesia right upper quadrant incision was made optical trocar used into peritoneal cavity under direct vision.  Pneumoperitoneum was achieved without difficulty.  The abdomen was explored completely laparoscopically visualizing all visceral and parietal peritoneal surfaces as well as carefully examining the surface of the liver.  There was no evidence of occult metastatic disease that would preclude an aggressive approach to the primary tumor.      After infiltration with local anesthesia, under ultrasound guidance the right internal jugular vein was cannulated with a large-bore needle and a guidewire was placed under fluoroscopic guidance into the superior vena cava.  Incision was made and a pocket was created for the port over the pectoralis fascia.  A PowerPort was assembled and flushed it was soaked in antimicrobial solution, placed in the pocket and the catheter was tunneled to the wire  insertion site without difficulty.  The catheter was cut to size using fluoroscopic guidance.  An introducer dilator was placed over the guidewire and the catheter was threaded through the peel-away introducer without difficulty.  Final fluoroscopy revealed the tip of the catheter within the superior vena cava, the port flushed and aspirated freely, final heparin solution was instilled.  There was no evidence of pneumothorax or other complication on final fluoroscopy.  The port pocket was irrigated with antimicrobial solution, meticulous hemostasis was achieved, it was closed in multiple layers with absorbable suture and sterile dressings were applied.  The patient tolerated the procedure well.      Chucky Olguin MD  Surgical Oncology  Complex General, Gastrointestinal and Hepatobiliary Surgery

## 2023-10-18 ENCOUNTER — OFFICE VISIT (OUTPATIENT)
Dept: HEMATOLOGY/ONCOLOGY | Facility: CLINIC | Age: 83
End: 2023-10-18
Payer: COMMERCIAL

## 2023-10-18 DIAGNOSIS — C25.9 PANCREATIC ADENOCARCINOMA: ICD-10-CM

## 2023-10-18 DIAGNOSIS — Z80.3 FAMILY HISTORY OF BREAST CANCER: Primary | ICD-10-CM

## 2023-10-18 PROCEDURE — 1101F PT FALLS ASSESS-DOCD LE1/YR: CPT | Mod: CPTII,95,, | Performed by: NURSE PRACTITIONER

## 2023-10-18 PROCEDURE — 3288F FALL RISK ASSESSMENT DOCD: CPT | Mod: CPTII,95,, | Performed by: NURSE PRACTITIONER

## 2023-10-18 PROCEDURE — 1101F PR PT FALLS ASSESS DOC 0-1 FALLS W/OUT INJ PAST YR: ICD-10-PCS | Mod: CPTII,95,, | Performed by: NURSE PRACTITIONER

## 2023-10-18 PROCEDURE — 3288F PR FALLS RISK ASSESSMENT DOCUMENTED: ICD-10-PCS | Mod: CPTII,95,, | Performed by: NURSE PRACTITIONER

## 2023-10-18 PROCEDURE — 1159F PR MEDICATION LIST DOCUMENTED IN MEDICAL RECORD: ICD-10-PCS | Mod: CPTII,95,, | Performed by: NURSE PRACTITIONER

## 2023-10-18 PROCEDURE — 99215 PR OFFICE/OUTPT VISIT, EST, LEVL V, 40-54 MIN: ICD-10-PCS | Mod: 95,,, | Performed by: NURSE PRACTITIONER

## 2023-10-18 PROCEDURE — 99215 OFFICE O/P EST HI 40 MIN: CPT | Mod: 95,,, | Performed by: NURSE PRACTITIONER

## 2023-10-18 PROCEDURE — 1159F MED LIST DOCD IN RCRD: CPT | Mod: CPTII,95,, | Performed by: NURSE PRACTITIONER

## 2023-10-18 NOTE — PROGRESS NOTES
REFERRING PROVIDER: Dr. Javed Forman      Patient ID: Darby Gary is a 83 y.o. female.    Chief Complaint: Personal history of recently diagnosed pancreatic cancer and a family history of cancer. Patient presented via Telemedicine Visit (audio only) today for risk assessment, genetic counseling, and consideration for genetic testing.    HPI  Past Medical History:   Diagnosis Date    Arthritis     Diabetes mellitus     Hyperlipidemia     Hypertension     Osteoporosis     Pancreatic cancer         Past Surgical History:   Procedure Laterality Date    COLONOSCOPY      DIAGNOSTIC LAPAROSCOPY N/A 10/9/2023    Procedure: LAPAROSCOPY, DIAGNOSTIC  Req 12/12:30;  Surgeon: Chucky Olguin MD;  Location: [facility] OR;  Service: General;  Laterality: N/A;    ERCP N/A 9/13/2023    Procedure: ERCP (ENDOSCOPIC RETROGRADE CHOLANGIOPANCREATOGRAPHY)/ cytology brushing;  Surgeon: Erika Ulloa MD;  Location: [facility] ENDOSCOPY;  Service: Gastroenterology;  Laterality: N/A;    ERCP W/ SPHICTEROTOMY  9/13/2023    Procedure: ERCP, WITH SPHINCTEROTOMY;  Surgeon: Erika Ulloa MD;  Location: [facility] ENDOSCOPY;  Service: Gastroenterology;;    ERCP, WITH STENT INSERTION  9/13/2023    Procedure: ERCP, WITH STENT INSERTION;  Surgeon: Erika Ulloa MD;  Location: [facility] ENDOSCOPY;  Service: Gastroenterology;;    ESOPHAGOGASTRODUODENOSCOPY N/A 9/13/2023    Procedure: EGD (ESOPHAGOGASTRODUODENOSCOPY);  Surgeon: Erika Ulloa MD;  Location: [facility] ENDOSCOPY;  Service: Gastroenterology;  Laterality: N/A;    PLACEMENT, MEDIPORT Right 10/9/2023    Procedure: PLACEMENT, MEDIPORT;  Surgeon: Chucky Olguin MD;  Location: [facility] OR;  Service: General;  Laterality: Right;        Review of patient's allergies indicates:  Patient has no known allergies.     Review of Systems        Problem List Items Addressed This Visit    None       Oncology History    No history exists.      Family History   Problem Relation Age of Onset     Leukemia Father     Prostate cancer Brother     Breast cancer Other 48    Breast cancer Maternal Aunt     Breast cancer Maternal Aunt     Breast cancer Maternal Aunt     Cancer Paternal Uncle             Assessment:   Risk Assessment:  This patient is at increased risk of having an inherited genetic mutation that increases the risk for cancer. Patient meets criteria for genetic testing based on the National Comprehensive Cancer Network (NCCN) criteria due to a personal history of pancreatic cancer whereas genetic test result could influence treatment, with additional evidence due to family history that includes breast cancer (niece [id number], maternal aunts x 3) and prostate cancer (brother) (see family history and pedigree). Based on the likelihood of having a mutation, BRCA1/2 Analysis with "[employer]" CancerNext-Expanded+RNAinsight panel testing was described in detail.    Education and Counseling:  Monitoring DivisionNext-Expanded evaluates a broad number of hereditary cancer syndromes to help define patients' cancer risk. This cancer panel tests (77 total): AIP, ALK, APC*, ROMEO*, AXIN2, BAP1, BARD1, BLM, BMPR1A, BRCA1*, BRCA2*, BRIP1*, CDC73, CDH1*, CDK4, CDKN1B, CDKN2A, CHEK2*, CTNNA1, DICER1, FANCC, FH, FLCN, GALNT12, KIF1B, LZTR1, MAX, MEN1, MET, MLH1*, MSH2*, MSH3, MSH6*, MUTYH*, NBN, NF1*, NF2, NTHL1, PALB2*, PHOX2B, PMS2*, POT1, ITHSV3F, PTCH1, PTEN*, RAD51C*, RAD51D*, RB1, RECQL, RET, SDHA, SDHAF2, SDHB, SDHC, SDHD, SMAD4, SMARCA4, SMARCB1, SMARCE1, STK11, SUFU, XGKF036, TP53*, TSC1, TSC2, VHL and XRCC2 (sequencing and deletion/duplication); EGFR, EGLN1, HOXB13, KIT, MITF, PDGFRA, POLD1 and POLE (sequencing only); EPCAM and GREM1 (deletion/duplication only). DNA and RNA analyses performed for * genes.    Risks of cancer associated with inherited cancer predisposition mutations were discussed in detail.  If a mutation were found, this patient would have a significantly increased risk for cancer.   Inherited cancer syndromes included in this test, may have different, but still significant risk for cancer.  Risk of cancer with any particular gene mutation will be discussed at the time of results disclosure and based on the results.    The availability of clinical management options for inherited cancer predisposition mutation carriers was discussed, including increased surveillance, chemoprevention, and prophylactic surgery. Details of the testing process, including benefits and limitations of genetic analysis as well as the implications of possible test results, were discussed.  Because this patient is the first member of the family to be tested comprehensive panel testing was presented.  Related insurance issues were discussed.      Summary:  This patient was evaluated for hereditary risk of cancer and was found to be at an increased risk of having an inherited cancer predisposition gene mutation.  The option of genetic testing was explained in detail, including the possible impact of this information on family members.  Since this patient wishes to proceed with testing an informed consent was obtained and blood drawn and sent to [facility].  Results will be expected 4 weeks from this time.  A follow-up appointment will be scheduled for results disclosure.       The patient location is: home.     Visit type: audio only    Time with patient: 25 minutes  >40 minutes of total time spent on the encounter, which includes face to face time and non-face to face time preparing to see the patient (eg, review of tests), Obtaining and/or reviewing separately obtained history, Documenting clinical information in the electronic or other health record, Independently interpreting results (not separately reported) and communicating results to the patient/family/caregiver, or Care coordination (not separately reported).       Each patient to whom he or she provides medical services by telemedicine is:  (1) informed of the  relationship between the physician and patient and the respective role of any other health care provider with respect to management of the patient; and (2) notified that he or she may decline to receive medical services by telemedicine and may withdraw from such care at any time.    MARTINE DELACRUZ, PhD

## 2023-10-23 ENCOUNTER — OFFICE VISIT (OUTPATIENT)
Dept: HEMATOLOGY/ONCOLOGY | Facility: CLINIC | Age: 83
End: 2023-10-23
Payer: COMMERCIAL

## 2023-10-23 VITALS
TEMPERATURE: 98 F | BODY MASS INDEX: 28.55 KG/M2 | OXYGEN SATURATION: 99 % | HEIGHT: 62 IN | DIASTOLIC BLOOD PRESSURE: 84 MMHG | HEART RATE: 75 BPM | RESPIRATION RATE: 18 BRPM | WEIGHT: 155.13 LBS | SYSTOLIC BLOOD PRESSURE: 134 MMHG

## 2023-10-23 DIAGNOSIS — Z79.899 ON ANTINEOPLASTIC CHEMOTHERAPY: ICD-10-CM

## 2023-10-23 DIAGNOSIS — C25.9 PANCREATIC ADENOCARCINOMA: Primary | ICD-10-CM

## 2023-10-23 PROCEDURE — 1159F PR MEDICATION LIST DOCUMENTED IN MEDICAL RECORD: ICD-10-PCS | Mod: CPTII,S$GLB,, | Performed by: NURSE PRACTITIONER

## 2023-10-23 PROCEDURE — 3075F SYST BP GE 130 - 139MM HG: CPT | Mod: CPTII,S$GLB,, | Performed by: NURSE PRACTITIONER

## 2023-10-23 PROCEDURE — 99215 OFFICE O/P EST HI 40 MIN: CPT | Mod: S$GLB,,, | Performed by: NURSE PRACTITIONER

## 2023-10-23 PROCEDURE — 3288F FALL RISK ASSESSMENT DOCD: CPT | Mod: CPTII,S$GLB,, | Performed by: NURSE PRACTITIONER

## 2023-10-23 PROCEDURE — 1160F PR REVIEW ALL MEDS BY PRESCRIBER/CLIN PHARMACIST DOCUMENTED: ICD-10-PCS | Mod: CPTII,S$GLB,, | Performed by: NURSE PRACTITIONER

## 2023-10-23 PROCEDURE — 99215 PR OFFICE/OUTPT VISIT, EST, LEVL V, 40-54 MIN: ICD-10-PCS | Mod: S$GLB,,, | Performed by: NURSE PRACTITIONER

## 2023-10-23 PROCEDURE — 99999 PR PBB SHADOW E&M-EST. PATIENT-LVL IV: CPT | Mod: PBBFAC,,, | Performed by: NURSE PRACTITIONER

## 2023-10-23 PROCEDURE — 3079F DIAST BP 80-89 MM HG: CPT | Mod: CPTII,S$GLB,, | Performed by: NURSE PRACTITIONER

## 2023-10-23 PROCEDURE — 1101F PT FALLS ASSESS-DOCD LE1/YR: CPT | Mod: CPTII,S$GLB,, | Performed by: NURSE PRACTITIONER

## 2023-10-23 PROCEDURE — 1101F PR PT FALLS ASSESS DOC 0-1 FALLS W/OUT INJ PAST YR: ICD-10-PCS | Mod: CPTII,S$GLB,, | Performed by: NURSE PRACTITIONER

## 2023-10-23 PROCEDURE — 3288F PR FALLS RISK ASSESSMENT DOCUMENTED: ICD-10-PCS | Mod: CPTII,S$GLB,, | Performed by: NURSE PRACTITIONER

## 2023-10-23 PROCEDURE — 1160F RVW MEDS BY RX/DR IN RCRD: CPT | Mod: CPTII,S$GLB,, | Performed by: NURSE PRACTITIONER

## 2023-10-23 PROCEDURE — 3079F PR MOST RECENT DIASTOLIC BLOOD PRESSURE 80-89 MM HG: ICD-10-PCS | Mod: CPTII,S$GLB,, | Performed by: NURSE PRACTITIONER

## 2023-10-23 PROCEDURE — 3075F PR MOST RECENT SYSTOLIC BLOOD PRESS GE 130-139MM HG: ICD-10-PCS | Mod: CPTII,S$GLB,, | Performed by: NURSE PRACTITIONER

## 2023-10-23 PROCEDURE — 99999 PR PBB SHADOW E&M-EST. PATIENT-LVL IV: ICD-10-PCS | Mod: PBBFAC,,, | Performed by: NURSE PRACTITIONER

## 2023-10-23 PROCEDURE — 1159F MED LIST DOCD IN RCRD: CPT | Mod: CPTII,S$GLB,, | Performed by: NURSE PRACTITIONER

## 2023-10-23 RX ORDER — EPINEPHRINE 0.3 MG/.3ML
0.3 INJECTION SUBCUTANEOUS ONCE AS NEEDED
Status: CANCELLED | OUTPATIENT
Start: 2023-10-24

## 2023-10-23 RX ORDER — SODIUM CHLORIDE 0.9 % (FLUSH) 0.9 %
10 SYRINGE (ML) INJECTION
Status: CANCELLED | OUTPATIENT
Start: 2023-10-26

## 2023-10-23 RX ORDER — FLUOROURACIL 50 MG/ML
2400 INJECTION, SOLUTION INTRAVENOUS
Status: CANCELLED | OUTPATIENT
Start: 2023-10-24

## 2023-10-23 RX ORDER — ATROPINE SULFATE 0.4 MG/ML
0.4 INJECTION, SOLUTION ENDOTRACHEAL; INTRAMEDULLARY; INTRAMUSCULAR; INTRAVENOUS; SUBCUTANEOUS ONCE AS NEEDED
Status: CANCELLED | OUTPATIENT
Start: 2023-10-24

## 2023-10-23 RX ORDER — HEPARIN 100 UNIT/ML
500 SYRINGE INTRAVENOUS
Status: CANCELLED | OUTPATIENT
Start: 2023-10-26

## 2023-10-23 RX ORDER — PROCHLORPERAZINE EDISYLATE 5 MG/ML
5 INJECTION INTRAMUSCULAR; INTRAVENOUS ONCE AS NEEDED
Status: CANCELLED | OUTPATIENT
Start: 2023-10-24

## 2023-10-23 RX ORDER — SODIUM CHLORIDE 0.9 % (FLUSH) 0.9 %
10 SYRINGE (ML) INJECTION
Status: CANCELLED | OUTPATIENT
Start: 2023-10-24

## 2023-10-23 RX ORDER — DIPHENHYDRAMINE HYDROCHLORIDE 50 MG/ML
50 INJECTION INTRAMUSCULAR; INTRAVENOUS ONCE AS NEEDED
Status: CANCELLED | OUTPATIENT
Start: 2023-10-24

## 2023-10-23 RX ORDER — HEPARIN 100 UNIT/ML
500 SYRINGE INTRAVENOUS
Status: CANCELLED | OUTPATIENT
Start: 2023-10-24

## 2023-10-23 NOTE — PROGRESS NOTES
Subjective:       Patient ID: Darby Gary is a 83 y.o. female.    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  This was staged using CT scan.    Current Treatment:   If surgically resectable (sees Dr. Olguin on 10/03/2023), will treat with modified FOLFIRINOX without the 5FU bolus.  This treatment was started on 10/10/2023    Treatment History:  ERCP on 09/13/2023    HPI:  83-year-old female was seen by her nephrologist, blood work revealed elevated liver enzymes and bilirubin, she was instructed to present to the emergency department on 09/11/2023.  She presented to [facility] on 09/11/2023.  CMP in the emergency department revealed an ALT of 233, AST of 263, alkaline phosphatase of 1113, and a bilirubin of 2.3.  Hepatitis panel was done and was negative, lipase was also within normal limits.  A CEA was normal at 1.96, CA 19 9 was extremely elevated at 2461.89.  The patient underwent a CT scan of the abdomen and pelvis with contrast on 09/11/2023 that revealed suspected obstructive pancreatic head mass measuring 2.2 cm with moderate dilation of the biliary ductal system and severe dilation of the pancreatic duct.  There was 1-2 possible regional enlarged lymph nodes in the periportal region.  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  A biliary sphincterotomy was performed and 1 uncovered metal biliary stent was placed.  Brushings were obtained, cytology revealed adenocarcinoma.  She was discharged home in stable condition on 09/14/2023.  The patient was referred to Medical Oncology.  I saw the patient on 09/25/2023.  She stated that she was feeling okay.  She had no major issues to discuss.    Interval History:   Patient  is here today for a treatment visit.  She started modified FOLFIRINOX on 10/10/2023.  She feels well overall and denies complaints.  She denies any side effects associated with treatment.  She specifically denies any nausea, diarrhea, abdominal pain.  Her appetite is good    Past Medical History:   Diagnosis Date    Arthritis     Diabetes mellitus     Hyperlipidemia     Hypertension     Osteoporosis     Pancreatic cancer       Past Surgical History:   Procedure Laterality Date    COLONOSCOPY      DIAGNOSTIC LAPAROSCOPY N/A 10/9/2023    Procedure: LAPAROSCOPY, DIAGNOSTIC  Req 12/12:30;  Surgeon: Chucky Olguin MD;  Location: [facility] OR;  Service: General;  Laterality: N/A;    ERCP N/A 9/13/2023    Procedure: ERCP (ENDOSCOPIC RETROGRADE CHOLANGIOPANCREATOGRAPHY)/ cytology brushing;  Surgeon: Erika Ulloa MD;  Location: [facility] ENDOSCOPY;  Service: Gastroenterology;  Laterality: N/A;    ERCP W/ SPHICTEROTOMY  9/13/2023    Procedure: ERCP, WITH SPHINCTEROTOMY;  Surgeon: Erika Ulloa MD;  Location: [facility] ENDOSCOPY;  Service: Gastroenterology;;    ERCP, WITH STENT INSERTION  9/13/2023    Procedure: ERCP, WITH STENT INSERTION;  Surgeon: Erika Ulloa MD;  Location: [facility] ENDOSCOPY;  Service: Gastroenterology;;    ESOPHAGOGASTRODUODENOSCOPY N/A 9/13/2023    Procedure: EGD (ESOPHAGOGASTRODUODENOSCOPY);  Surgeon: Erika Ulloa MD;  Location: [facility] ENDOSCOPY;  Service: Gastroenterology;  Laterality: N/A;    PLACEMENT, MEDIPORT Right 10/9/2023    Procedure: PLACEMENT, MEDIPORT;  Surgeon: Chucky Olguin MD;  Location: [facility] OR;  Service: General;  Laterality: Right;     Social History     Socioeconomic History    Marital status:    Tobacco Use    Smoking status: Former     Types: Cigarettes    Smokeless tobacco: Never    Tobacco comments:     Quit over 25 years ago.    Substance and Sexual Activity    Alcohol use: Never    Drug use: Never    Sexual activity: Not Currently     Social  Determinants of Health     Financial Resource Strain: High Risk (9/13/2023)    Overall Financial Resource Strain (CARDIA)     Difficulty of Paying Living Expenses: Hard   Food Insecurity: Food Insecurity Present (9/13/2023)    Hunger Vital Sign     Worried About Running Out of Food in the Last Year: Sometimes true     Ran Out of Food in the Last Year: Sometimes true   Transportation Needs: No Transportation Needs (9/13/2023)    PRAPARE - Transportation     Lack of Transportation (Medical): No     Lack of Transportation (Non-Medical): No   Physical Activity: Inactive (9/13/2023)    Exercise Vital Sign     Days of Exercise per Week: 0 days     Minutes of Exercise per Session: 0 min   Stress: No Stress Concern Present (9/13/2023)    Citizen of [place] of Occupational [employer] - Occupational Stress Questionnaire     Feeling of Stress : Only a little   Social Connections: Moderately Isolated (9/13/2023)    Social Connection and Isolation Panel [NHANES]     Frequency of Communication with Friends and Family: More than three times a week     Frequency of Social Gatherings with Friends and Family: More than three times a week     Attends Congregation Services: More than 4 times per year     Active Member of Clubs or Organizations: No     Attends Club or Organization Meetings: Never     Marital Status:    Housing Stability: Low Risk  (9/13/2023)    Housing Stability Vital Sign     Unable to Pay for Housing in the Last Year: No     Number of Places Lived in the Last Year: 1     Unstable Housing in the Last Year: No      Family History   Problem Relation Age of Onset    Leukemia Father     Prostate cancer Brother     Breast cancer Other 48    Breast cancer Maternal Aunt     Breast cancer Maternal Aunt     Breast cancer Maternal Aunt     Cancer Paternal Uncle       Review of patient's allergies indicates:  No Known Allergies   Review of Systems   Constitutional:  Negative for chills, diaphoresis, fatigue, fever and unexpected  weight change.   HENT:  Negative for nasal congestion, mouth sores, sinus pressure/congestion and sore throat.    Eyes:  Negative for pain and visual disturbance.   Respiratory:  Negative for cough, chest tightness and shortness of breath.    Cardiovascular:  Negative for chest pain, palpitations and leg swelling.   Gastrointestinal:  Negative for abdominal distention, abdominal pain, blood in stool, constipation and diarrhea.   Genitourinary:  Negative for dysuria, frequency and hematuria.   Musculoskeletal:  Negative for arthralgias and back pain.   Integumentary:  Negative for rash.   Neurological:  Negative for dizziness, weakness, numbness and headaches.   Hematological:  Negative for adenopathy.   Psychiatric/Behavioral:  Negative for confusion.          Objective:      Physical Exam  Vitals reviewed.   Constitutional:       General: She is not in acute distress.     Appearance: Normal appearance.   HENT:      Head: Normocephalic and atraumatic.      Nose: Nose normal.      Mouth/Throat:      Mouth: Mucous membranes are moist.   Eyes:      Extraocular Movements: Extraocular movements intact.      Conjunctiva/sclera: Conjunctivae normal.   Cardiovascular:      Rate and Rhythm: Normal rate and regular rhythm.      Pulses: Normal pulses.      Heart sounds: Normal heart sounds.   Pulmonary:      Effort: Pulmonary effort is normal.      Breath sounds: Normal breath sounds.   Abdominal:      General: Bowel sounds are normal.      Palpations: Abdomen is soft.   Musculoskeletal:         General: No swelling. Normal range of motion.      Cervical back: Normal range of motion and neck supple.      Right lower leg: No edema.      Left lower leg: No edema.   Skin:     General: Skin is warm and dry.      Comments: Stitch with surgical glue to the right lateral neck, no drainage or redness.   Neurological:      General: No focal deficit present.      Mental Status: She is alert and oriented to person, place, and time.  Mental status is at baseline.   Psychiatric:         Mood and Affect: Mood normal.         Behavior: Behavior normal.         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  This was staged using CT scan.        Plan:       Patient has at least stage IIB pancreatic cancer based on CT abdomen/pelvis measurements.     PET/CT scan on 10/05/2023- No FDG avid metastatic disease identified. Focal uptake in the pancreatic head about a CBD stent compatible with patient's primary malignancy.    Patient will see Dr. Cuhcky Olguin on 10/03/2023.  If he was potentially resectable, we will plan to treat with modified FOLFIRINOX and hold the 5 FU bolus.  This was started on 10/10/2023    If the patient was not resectable or had metastatic disease, we would likely consider gem Abraxane.    She has met with Genetics.      She will follow up with me prior to cycle 3.    KAR Tang

## 2023-10-24 ENCOUNTER — INFUSION (OUTPATIENT)
Dept: INFUSION THERAPY | Facility: HOSPITAL | Age: 83
End: 2023-10-24
Attending: INTERNAL MEDICINE
Payer: COMMERCIAL

## 2023-10-24 VITALS
DIASTOLIC BLOOD PRESSURE: 74 MMHG | SYSTOLIC BLOOD PRESSURE: 127 MMHG | HEART RATE: 79 BPM | RESPIRATION RATE: 18 BRPM | HEIGHT: 62 IN | TEMPERATURE: 98 F | BODY MASS INDEX: 28.52 KG/M2 | WEIGHT: 155 LBS

## 2023-10-24 DIAGNOSIS — C25.9 PANCREATIC ADENOCARCINOMA: Primary | ICD-10-CM

## 2023-10-24 PROCEDURE — 96415 CHEMO IV INFUSION ADDL HR: CPT

## 2023-10-24 PROCEDURE — 63600175 PHARM REV CODE 636 W HCPCS: Performed by: NURSE PRACTITIONER

## 2023-10-24 PROCEDURE — 25000003 PHARM REV CODE 250: Performed by: INTERNAL MEDICINE

## 2023-10-24 PROCEDURE — 96368 THER/DIAG CONCURRENT INF: CPT

## 2023-10-24 PROCEDURE — 96367 TX/PROPH/DG ADDL SEQ IV INF: CPT

## 2023-10-24 PROCEDURE — 25000003 PHARM REV CODE 250: Performed by: NURSE PRACTITIONER

## 2023-10-24 PROCEDURE — 96417 CHEMO IV INFUS EACH ADDL SEQ: CPT

## 2023-10-24 PROCEDURE — 63600175 PHARM REV CODE 636 W HCPCS: Performed by: INTERNAL MEDICINE

## 2023-10-24 PROCEDURE — 96416 CHEMO PROLONG INFUSE W/PUMP: CPT

## 2023-10-24 PROCEDURE — 96413 CHEMO IV INFUSION 1 HR: CPT

## 2023-10-24 RX ORDER — SODIUM CHLORIDE 0.9 % (FLUSH) 0.9 %
10 SYRINGE (ML) INJECTION
Status: DISCONTINUED | OUTPATIENT
Start: 2023-10-24 | End: 2023-10-24 | Stop reason: HOSPADM

## 2023-10-24 RX ORDER — HEPARIN 100 UNIT/ML
500 SYRINGE INTRAVENOUS
Status: DISCONTINUED | OUTPATIENT
Start: 2023-10-24 | End: 2023-10-24 | Stop reason: HOSPADM

## 2023-10-24 RX ORDER — ATROPINE SULFATE 0.4 MG/ML
0.4 INJECTION, SOLUTION ENDOTRACHEAL; INTRAMEDULLARY; INTRAMUSCULAR; INTRAVENOUS; SUBCUTANEOUS ONCE AS NEEDED
Status: DISCONTINUED | OUTPATIENT
Start: 2023-10-24 | End: 2023-10-24 | Stop reason: HOSPADM

## 2023-10-24 RX ORDER — DIPHENHYDRAMINE HYDROCHLORIDE 50 MG/ML
50 INJECTION INTRAMUSCULAR; INTRAVENOUS ONCE AS NEEDED
Status: DISCONTINUED | OUTPATIENT
Start: 2023-10-24 | End: 2023-10-24 | Stop reason: HOSPADM

## 2023-10-24 RX ORDER — EPINEPHRINE 0.3 MG/.3ML
0.3 INJECTION SUBCUTANEOUS ONCE AS NEEDED
Status: DISCONTINUED | OUTPATIENT
Start: 2023-10-24 | End: 2023-10-24 | Stop reason: HOSPADM

## 2023-10-24 RX ORDER — PROCHLORPERAZINE EDISYLATE 5 MG/ML
5 INJECTION INTRAMUSCULAR; INTRAVENOUS ONCE AS NEEDED
Status: DISCONTINUED | OUTPATIENT
Start: 2023-10-24 | End: 2023-10-24 | Stop reason: HOSPADM

## 2023-10-24 RX ADMIN — FLUOROURACIL 4250 MG: 50 INJECTION, SOLUTION INTRAVENOUS at 01:10

## 2023-10-24 RX ADMIN — PALONOSETRON 0.25 MG: 0.25 INJECTION, SOLUTION INTRAVENOUS at 08:10

## 2023-10-24 RX ADMIN — IRINOTECAN HYDROCHLORIDE 260 MG: 20 INJECTION, SOLUTION INTRAVENOUS at 11:10

## 2023-10-24 RX ADMIN — LEUCOVORIN CALCIUM 700 MG: 350 INJECTION, POWDER, LYOPHILIZED, FOR SOLUTION INTRAMUSCULAR; INTRAVENOUS at 10:10

## 2023-10-24 RX ADMIN — OXALIPLATIN 150 MG: 5 INJECTION, SOLUTION INTRAVENOUS at 09:10

## 2023-10-25 ENCOUNTER — OFFICE VISIT (OUTPATIENT)
Dept: SURGICAL ONCOLOGY | Facility: CLINIC | Age: 83
End: 2023-10-25
Payer: COMMERCIAL

## 2023-10-25 ENCOUNTER — PATIENT MESSAGE (OUTPATIENT)
Dept: HEMATOLOGY/ONCOLOGY | Facility: CLINIC | Age: 83
End: 2023-10-25
Payer: COMMERCIAL

## 2023-10-25 DIAGNOSIS — C25.9 MALIGNANT NEOPLASM OF PANCREAS, UNSPECIFIED LOCATION OF MALIGNANCY: Primary | ICD-10-CM

## 2023-10-25 PROCEDURE — 99024 POSTOP FOLLOW-UP VISIT: CPT | Mod: S$GLB,,, | Performed by: NURSE PRACTITIONER

## 2023-10-25 PROCEDURE — 99024 PR POST-OP FOLLOW-UP VISIT: ICD-10-PCS | Mod: S$GLB,,, | Performed by: NURSE PRACTITIONER

## 2023-10-25 NOTE — PROGRESS NOTES
POST OP DIAG LAPAROSCOPY, MEDIPORT INSERTION  DX PANCREATIC CANCER    PT REPORTS DOING WELL  HAS STARTED CHEMO AND APPEARS TO BE TOLERATING WELL  NO COMPLAINTS REGARDING SURGERY    ABD SOFT  INCISIONS HEALING WELL    NO METASTATIC DISEASE ON LAPAROSCOPY  DISCUSSED WITH PT    MED ONC IS DR MOSQUERA    PT WILL CONTINUE TO FOLLOW WITH HIM AND RTC AFTER TREATMENT

## 2023-10-26 ENCOUNTER — INFUSION (OUTPATIENT)
Dept: INFUSION THERAPY | Facility: HOSPITAL | Age: 83
End: 2023-10-26
Attending: INTERNAL MEDICINE
Payer: COMMERCIAL

## 2023-10-26 VITALS
WEIGHT: 154 LBS | OXYGEN SATURATION: 99 % | RESPIRATION RATE: 18 BRPM | HEIGHT: 62 IN | HEART RATE: 76 BPM | BODY MASS INDEX: 28.34 KG/M2 | DIASTOLIC BLOOD PRESSURE: 65 MMHG | TEMPERATURE: 98 F | SYSTOLIC BLOOD PRESSURE: 114 MMHG

## 2023-10-26 DIAGNOSIS — C25.9 PANCREATIC ADENOCARCINOMA: Primary | ICD-10-CM

## 2023-10-26 PROCEDURE — 96360 HYDRATION IV INFUSION INIT: CPT

## 2023-10-26 PROCEDURE — A4216 STERILE WATER/SALINE, 10 ML: HCPCS | Performed by: NURSE PRACTITIONER

## 2023-10-26 PROCEDURE — 25000003 PHARM REV CODE 250: Performed by: NURSE PRACTITIONER

## 2023-10-26 PROCEDURE — 63600175 PHARM REV CODE 636 W HCPCS: Performed by: NURSE PRACTITIONER

## 2023-10-26 RX ORDER — HEPARIN 100 UNIT/ML
500 SYRINGE INTRAVENOUS
Status: DISCONTINUED | OUTPATIENT
Start: 2023-10-26 | End: 2023-10-26 | Stop reason: HOSPADM

## 2023-10-26 RX ORDER — SODIUM CHLORIDE 0.9 % (FLUSH) 0.9 %
10 SYRINGE (ML) INJECTION
Status: DISCONTINUED | OUTPATIENT
Start: 2023-10-26 | End: 2023-10-26 | Stop reason: HOSPADM

## 2023-10-26 RX ADMIN — HEPARIN 500 UNITS: 100 SYRINGE at 12:10

## 2023-10-26 RX ADMIN — SODIUM CHLORIDE 1000 ML: 9 INJECTION, SOLUTION INTRAVENOUS at 11:10

## 2023-10-26 RX ADMIN — Medication 10 ML: at 12:10

## 2023-10-26 RX ADMIN — PEGFILGRASTIM-CBQV 6 MG: 6 INJECTION, SOLUTION SUBCUTANEOUS at 11:10

## 2023-10-26 NOTE — PLAN OF CARE
Normal saline 1 liter infused Cadd pump removed.  Tolerated well.  Discharged home with copy of upcoming appointments

## 2023-10-31 ENCOUNTER — TELEPHONE (OUTPATIENT)
Dept: HEMATOLOGY/ONCOLOGY | Facility: CLINIC | Age: 83
End: 2023-10-31
Payer: COMMERCIAL

## 2023-10-31 DIAGNOSIS — R63.0 LOSS OF APPETITE: Primary | ICD-10-CM

## 2023-10-31 RX ORDER — MIRTAZAPINE 7.5 MG/1
7.5 TABLET, FILM COATED ORAL NIGHTLY
Qty: 30 TABLET | Refills: 3 | Status: SHIPPED | OUTPATIENT
Start: 2023-10-31 | End: 2024-10-30

## 2023-11-03 PROBLEM — C77.2 MALIGNANT NEOPLASM METASTATIC TO INTRA-ABDOMINAL LYMPH NODE: Status: ACTIVE | Noted: 2023-11-03

## 2023-11-03 NOTE — PROGRESS NOTES
Subjective:       Patient ID: Darby Gary is a 83 y.o. female.    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  This was staged using CT scan.    Current Treatment:   Modified FOLFIRINOX without the 5FU bolus.  This treatment was started on 10/10/2023    Treatment History:  ERCP on 09/13/2023    HPI:  83-year-old female was seen by her nephrologist, blood work revealed elevated liver enzymes and bilirubin, she was instructed to present to the emergency department on 09/11/2023.  She presented to [facility] on 09/11/2023.  CMP in the emergency department revealed an ALT of 233, AST of 263, alkaline phosphatase of 1113, and a bilirubin of 2.3.  Hepatitis panel was done and was negative, lipase was also within normal limits.  A CEA was normal at 1.96, CA 19 9 was extremely elevated at 2461.89.  The patient underwent a CT scan of the abdomen and pelvis with contrast on 09/11/2023 that revealed suspected obstructive pancreatic head mass measuring 2.2 cm with moderate dilation of the biliary ductal system and severe dilation of the pancreatic duct.  There was 1-2 possible regional enlarged lymph nodes in the periportal region.  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  A biliary sphincterotomy was performed and 1 uncovered metal biliary stent was placed.  Brushings were obtained, cytology revealed adenocarcinoma.  She was discharged home in stable condition on 09/14/2023.  The patient was referred to Medical Oncology.  I saw the patient on 09/25/2023.  She stated that she was feeling okay.  She had no major issues to discuss.  She was seen by Dr. Chucky Olguin on 10/03/2023, underwent diagnostic laparoscopy and port placement on 10/10/2023.  No metastatic disease seen on  diagnostic laparoscopy.  Started modified FOLFIRINOX on 10/10/2023.    Interval History:   Patient is here today for a treatment visit.  She started modified FOLFIRINOX on 10/10/2023.  She feels well overall and denies complaints.  She denies any side effects associated with treatment.  She specifically denies any nausea, diarrhea, abdominal pain.  Her appetite is okay. She went to a festival this weekend.    Past Medical History:   Diagnosis Date    Arthritis     Diabetes mellitus     Hyperlipidemia     Hypertension     Osteoporosis     Pancreatic cancer       Past Surgical History:   Procedure Laterality Date    COLONOSCOPY      DIAGNOSTIC LAPAROSCOPY N/A 10/9/2023    Procedure: LAPAROSCOPY, DIAGNOSTIC  Req 12/12:30;  Surgeon: Chucky Olguin MD;  Location: [facility] OR;  Service: General;  Laterality: N/A;    ERCP N/A 9/13/2023    Procedure: ERCP (ENDOSCOPIC RETROGRADE CHOLANGIOPANCREATOGRAPHY)/ cytology brushing;  Surgeon: Erika Ulloa MD;  Location: [facility] ENDOSCOPY;  Service: Gastroenterology;  Laterality: N/A;    ERCP W/ SPHICTEROTOMY  9/13/2023    Procedure: ERCP, WITH SPHINCTEROTOMY;  Surgeon: Erika Ulloa MD;  Location: [facility] ENDOSCOPY;  Service: Gastroenterology;;    ERCP, WITH STENT INSERTION  9/13/2023    Procedure: ERCP, WITH STENT INSERTION;  Surgeon: Erika Ulloa MD;  Location: [facility] ENDOSCOPY;  Service: Gastroenterology;;    ESOPHAGOGASTRODUODENOSCOPY N/A 9/13/2023    Procedure: EGD (ESOPHAGOGASTRODUODENOSCOPY);  Surgeon: Erika Ulloa MD;  Location: [facility] ENDOSCOPY;  Service: Gastroenterology;  Laterality: N/A;    PLACEMENT, MEDIPORT Right 10/9/2023    Procedure: PLACEMENT, MEDIPORT;  Surgeon: Chucky Olguin MD;  Location: [facility] OR;  Service: General;  Laterality: Right;     Social History     Socioeconomic History    Marital status:    Tobacco Use    Smoking status: Former     Types: Cigarettes    Smokeless tobacco: Never    Tobacco comments:     Quit over 25  years ago.    Substance and Sexual Activity    Alcohol use: Never    Drug use: Never    Sexual activity: Not Currently     Social Determinants of Health     Financial Resource Strain: High Risk (9/13/2023)    Overall Financial Resource Strain (CARDIA)     Difficulty of Paying Living Expenses: Hard   Food Insecurity: Food Insecurity Present (9/13/2023)    Hunger Vital Sign     Worried About Running Out of Food in the Last Year: Sometimes true     Ran Out of Food in the Last Year: Sometimes true   Transportation Needs: No Transportation Needs (9/13/2023)    PRAPARE - Transportation     Lack of Transportation (Medical): No     Lack of Transportation (Non-Medical): No   Physical Activity: Inactive (9/13/2023)    Exercise Vital Sign     Days of Exercise per Week: 0 days     Minutes of Exercise per Session: 0 min   Stress: No Stress Concern Present (9/13/2023)    [place] [employer] [employer] - Occupational Stress Questionnaire     Feeling of Stress : Only a little   Social Connections: Moderately Isolated (9/13/2023)    Social Connection and Isolation Panel [NHANES]     Frequency of Communication with Friends and Family: More than three times a week     Frequency of Social Gatherings with Friends and Family: More than three times a week     Attends [employer] Services: More than 4 times per year     Active Member of Clubs or Organizations: No     Attends Club or Organization Meetings: Never     Marital Status:    Housing Stability: Low Risk  (9/13/2023)    Housing Stability Vital Sign     Unable to Pay for Housing in the Last Year: No     Number of Places Lived in the Last Year: 1     Unstable Housing in the Last Year: No      Family History   Problem Relation Age of Onset    Leukemia Father     Prostate cancer Brother     Breast cancer Other 48    Breast cancer Maternal Aunt     Breast cancer Maternal Aunt     Breast cancer Maternal Aunt     Cancer Paternal Uncle       Review of patient's allergies  indicates:  No Known Allergies   Review of Systems   Constitutional:  Negative for chills, diaphoresis, fatigue, fever and unexpected weight change.   HENT:  Negative for nasal congestion, mouth sores, sinus pressure/congestion and sore throat.    Eyes:  Negative for pain and visual disturbance.   Respiratory:  Negative for cough, chest tightness and shortness of breath.    Cardiovascular:  Negative for chest pain, palpitations and leg swelling.   Gastrointestinal:  Negative for abdominal distention, abdominal pain, blood in stool, constipation and diarrhea.   Genitourinary:  Negative for dysuria, frequency and hematuria.   Musculoskeletal:  Negative for arthralgias and back pain.   Integumentary:  Negative for rash.   Neurological:  Negative for dizziness, weakness, numbness and headaches.   Hematological:  Negative for adenopathy.   Psychiatric/Behavioral:  Negative for confusion.          Objective:      Physical Exam  Vitals reviewed.   Constitutional:       General: She is not in acute distress.     Appearance: Normal appearance.   HENT:      Head: Normocephalic and atraumatic.      Nose: Nose normal.      Mouth/Throat:      Mouth: Mucous membranes are moist.   Eyes:      Extraocular Movements: Extraocular movements intact.      Conjunctiva/sclera: Conjunctivae normal.   Cardiovascular:      Rate and Rhythm: Normal rate and regular rhythm.      Pulses: Normal pulses.      Heart sounds: Normal heart sounds.   Pulmonary:      Effort: Pulmonary effort is normal.      Breath sounds: Normal breath sounds.   Abdominal:      General: Bowel sounds are normal.      Palpations: Abdomen is soft.   Musculoskeletal:         General: No swelling. Normal range of motion.      Cervical back: Normal range of motion and neck supple.      Right lower leg: No edema.      Left lower leg: No edema.   Skin:     General: Skin is warm and dry.   Neurological:      General: No focal deficit present.      Mental Status: She is alert and  oriented to person, place, and time. Mental status is at baseline.   Psychiatric:         Mood and Affect: Mood normal.         Behavior: Behavior normal.         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  This was staged using CT scan.        Plan:       Patient has at least stage IIB pancreatic cancer based on CT abdomen/pelvis measurements.     PET/CT scan on 10/05/2023- No FDG avid metastatic disease identified. Focal uptake in the pancreatic head about a CBD stent compatible with patient's primary malignancy.    Patient saw Dr. Chucky Olguin on 10/03/2023, underwent port placement and diagnostic laparoscopy on 10/09/2023.  No metastatic disease seen.  Started modified FOLFIRINOX on 10/10/2023.    She has met with Genetics.    She will follow up with me prior to cycle 5    Jaevd Forman II, MD

## 2023-11-06 ENCOUNTER — OFFICE VISIT (OUTPATIENT)
Dept: HEMATOLOGY/ONCOLOGY | Facility: CLINIC | Age: 83
End: 2023-11-06
Payer: COMMERCIAL

## 2023-11-06 VITALS
TEMPERATURE: 99 F | OXYGEN SATURATION: 99 % | HEART RATE: 75 BPM | SYSTOLIC BLOOD PRESSURE: 132 MMHG | HEIGHT: 62 IN | WEIGHT: 153.25 LBS | BODY MASS INDEX: 28.2 KG/M2 | RESPIRATION RATE: 18 BRPM | DIASTOLIC BLOOD PRESSURE: 83 MMHG

## 2023-11-06 DIAGNOSIS — C77.2 MALIGNANT NEOPLASM METASTATIC TO INTRA-ABDOMINAL LYMPH NODE: ICD-10-CM

## 2023-11-06 DIAGNOSIS — C25.9 PANCREATIC ADENOCARCINOMA: Primary | ICD-10-CM

## 2023-11-06 PROCEDURE — 1160F RVW MEDS BY RX/DR IN RCRD: CPT | Mod: CPTII,S$GLB,, | Performed by: INTERNAL MEDICINE

## 2023-11-06 PROCEDURE — 1159F PR MEDICATION LIST DOCUMENTED IN MEDICAL RECORD: ICD-10-PCS | Mod: CPTII,S$GLB,, | Performed by: INTERNAL MEDICINE

## 2023-11-06 PROCEDURE — 3075F PR MOST RECENT SYSTOLIC BLOOD PRESS GE 130-139MM HG: ICD-10-PCS | Mod: CPTII,S$GLB,, | Performed by: INTERNAL MEDICINE

## 2023-11-06 PROCEDURE — 3079F PR MOST RECENT DIASTOLIC BLOOD PRESSURE 80-89 MM HG: ICD-10-PCS | Mod: CPTII,S$GLB,, | Performed by: INTERNAL MEDICINE

## 2023-11-06 PROCEDURE — 1160F PR REVIEW ALL MEDS BY PRESCRIBER/CLIN PHARMACIST DOCUMENTED: ICD-10-PCS | Mod: CPTII,S$GLB,, | Performed by: INTERNAL MEDICINE

## 2023-11-06 PROCEDURE — 99215 OFFICE O/P EST HI 40 MIN: CPT | Mod: S$GLB,,, | Performed by: INTERNAL MEDICINE

## 2023-11-06 PROCEDURE — 3079F DIAST BP 80-89 MM HG: CPT | Mod: CPTII,S$GLB,, | Performed by: INTERNAL MEDICINE

## 2023-11-06 PROCEDURE — 1101F PR PT FALLS ASSESS DOC 0-1 FALLS W/OUT INJ PAST YR: ICD-10-PCS | Mod: CPTII,S$GLB,, | Performed by: INTERNAL MEDICINE

## 2023-11-06 PROCEDURE — 3288F PR FALLS RISK ASSESSMENT DOCUMENTED: ICD-10-PCS | Mod: CPTII,S$GLB,, | Performed by: INTERNAL MEDICINE

## 2023-11-06 PROCEDURE — 99999 PR PBB SHADOW E&M-EST. PATIENT-LVL IV: CPT | Mod: PBBFAC,,, | Performed by: INTERNAL MEDICINE

## 2023-11-06 PROCEDURE — 3075F SYST BP GE 130 - 139MM HG: CPT | Mod: CPTII,S$GLB,, | Performed by: INTERNAL MEDICINE

## 2023-11-06 PROCEDURE — 1159F MED LIST DOCD IN RCRD: CPT | Mod: CPTII,S$GLB,, | Performed by: INTERNAL MEDICINE

## 2023-11-06 PROCEDURE — 3288F FALL RISK ASSESSMENT DOCD: CPT | Mod: CPTII,S$GLB,, | Performed by: INTERNAL MEDICINE

## 2023-11-06 PROCEDURE — 1101F PT FALLS ASSESS-DOCD LE1/YR: CPT | Mod: CPTII,S$GLB,, | Performed by: INTERNAL MEDICINE

## 2023-11-06 PROCEDURE — 99999 PR PBB SHADOW E&M-EST. PATIENT-LVL IV: ICD-10-PCS | Mod: PBBFAC,,, | Performed by: INTERNAL MEDICINE

## 2023-11-06 PROCEDURE — 99215 PR OFFICE/OUTPT VISIT, EST, LEVL V, 40-54 MIN: ICD-10-PCS | Mod: S$GLB,,, | Performed by: INTERNAL MEDICINE

## 2023-11-06 RX ORDER — SODIUM CHLORIDE 0.9 % (FLUSH) 0.9 %
10 SYRINGE (ML) INJECTION
Status: CANCELLED | OUTPATIENT
Start: 2023-11-07

## 2023-11-06 RX ORDER — HEPARIN 100 UNIT/ML
500 SYRINGE INTRAVENOUS
Status: CANCELLED | OUTPATIENT
Start: 2023-11-09

## 2023-11-06 RX ORDER — PROCHLORPERAZINE EDISYLATE 5 MG/ML
5 INJECTION INTRAMUSCULAR; INTRAVENOUS ONCE AS NEEDED
Status: CANCELLED | OUTPATIENT
Start: 2023-11-07

## 2023-11-06 RX ORDER — DIPHENHYDRAMINE HYDROCHLORIDE 50 MG/ML
50 INJECTION INTRAMUSCULAR; INTRAVENOUS ONCE AS NEEDED
Status: CANCELLED | OUTPATIENT
Start: 2023-11-07

## 2023-11-06 RX ORDER — FLUOROURACIL 50 MG/ML
2400 INJECTION, SOLUTION INTRAVENOUS
Status: CANCELLED | OUTPATIENT
Start: 2023-11-07

## 2023-11-06 RX ORDER — SODIUM CHLORIDE 0.9 % (FLUSH) 0.9 %
10 SYRINGE (ML) INJECTION
Status: CANCELLED | OUTPATIENT
Start: 2023-11-09

## 2023-11-06 RX ORDER — HYDROCORTISONE 25 MG/G
CREAM TOPICAL
COMMUNITY
Start: 2023-11-03

## 2023-11-06 RX ORDER — ATROPINE SULFATE 0.4 MG/ML
0.4 INJECTION, SOLUTION ENDOTRACHEAL; INTRAMEDULLARY; INTRAMUSCULAR; INTRAVENOUS; SUBCUTANEOUS ONCE AS NEEDED
Status: CANCELLED | OUTPATIENT
Start: 2023-11-07

## 2023-11-06 RX ORDER — HEPARIN 100 UNIT/ML
500 SYRINGE INTRAVENOUS
Status: CANCELLED | OUTPATIENT
Start: 2023-11-07

## 2023-11-06 RX ORDER — EPINEPHRINE 0.3 MG/.3ML
0.3 INJECTION SUBCUTANEOUS ONCE AS NEEDED
Status: CANCELLED | OUTPATIENT
Start: 2023-11-07

## 2023-11-07 ENCOUNTER — INFUSION (OUTPATIENT)
Dept: INFUSION THERAPY | Facility: HOSPITAL | Age: 83
End: 2023-11-07
Attending: INTERNAL MEDICINE
Payer: COMMERCIAL

## 2023-11-07 VITALS
HEART RATE: 80 BPM | DIASTOLIC BLOOD PRESSURE: 74 MMHG | HEIGHT: 62 IN | WEIGHT: 153.25 LBS | BODY MASS INDEX: 28.2 KG/M2 | SYSTOLIC BLOOD PRESSURE: 131 MMHG | RESPIRATION RATE: 18 BRPM

## 2023-11-07 DIAGNOSIS — T45.1X5A MOUTH SORE SECONDARY TO CHEMOTHERAPY: ICD-10-CM

## 2023-11-07 DIAGNOSIS — C25.9 PANCREATIC ADENOCARCINOMA: Primary | ICD-10-CM

## 2023-11-07 DIAGNOSIS — K13.79 MOUTH SORE SECONDARY TO CHEMOTHERAPY: ICD-10-CM

## 2023-11-07 DIAGNOSIS — C77.2 MALIGNANT NEOPLASM METASTATIC TO INTRA-ABDOMINAL LYMPH NODE: ICD-10-CM

## 2023-11-07 PROCEDURE — 25000003 PHARM REV CODE 250: Performed by: INTERNAL MEDICINE

## 2023-11-07 PROCEDURE — 96413 CHEMO IV INFUSION 1 HR: CPT

## 2023-11-07 PROCEDURE — 96415 CHEMO IV INFUSION ADDL HR: CPT

## 2023-11-07 PROCEDURE — 96375 TX/PRO/DX INJ NEW DRUG ADDON: CPT

## 2023-11-07 PROCEDURE — 63600175 PHARM REV CODE 636 W HCPCS: Performed by: INTERNAL MEDICINE

## 2023-11-07 PROCEDURE — 96416 CHEMO PROLONG INFUSE W/PUMP: CPT

## 2023-11-07 PROCEDURE — 96417 CHEMO IV INFUS EACH ADDL SEQ: CPT

## 2023-11-07 PROCEDURE — 96368 THER/DIAG CONCURRENT INF: CPT

## 2023-11-07 RX ORDER — VALACYCLOVIR HYDROCHLORIDE 1 G/1
1000 TABLET, FILM COATED ORAL 2 TIMES DAILY
Qty: 14 TABLET | Refills: 0 | Status: SHIPPED | OUTPATIENT
Start: 2023-11-07

## 2023-11-07 RX ORDER — PROCHLORPERAZINE EDISYLATE 5 MG/ML
5 INJECTION INTRAMUSCULAR; INTRAVENOUS ONCE AS NEEDED
Status: DISCONTINUED | OUTPATIENT
Start: 2023-11-07 | End: 2023-11-07 | Stop reason: HOSPADM

## 2023-11-07 RX ORDER — SODIUM CHLORIDE 0.9 % (FLUSH) 0.9 %
10 SYRINGE (ML) INJECTION
Status: DISCONTINUED | OUTPATIENT
Start: 2023-11-07 | End: 2023-11-07 | Stop reason: HOSPADM

## 2023-11-07 RX ORDER — ATROPINE SULFATE 0.4 MG/ML
0.4 INJECTION, SOLUTION ENDOTRACHEAL; INTRAMEDULLARY; INTRAMUSCULAR; INTRAVENOUS; SUBCUTANEOUS ONCE AS NEEDED
Status: DISCONTINUED | OUTPATIENT
Start: 2023-11-07 | End: 2023-11-07 | Stop reason: HOSPADM

## 2023-11-07 RX ORDER — DIPHENHYDRAMINE HYDROCHLORIDE 50 MG/ML
50 INJECTION INTRAMUSCULAR; INTRAVENOUS ONCE AS NEEDED
Status: DISCONTINUED | OUTPATIENT
Start: 2023-11-07 | End: 2023-11-07 | Stop reason: HOSPADM

## 2023-11-07 RX ORDER — DIPHENHYDRAMINE HYDROCHLORIDE 50 MG/ML
25 INJECTION INTRAMUSCULAR; INTRAVENOUS
Status: COMPLETED | OUTPATIENT
Start: 2023-11-07 | End: 2023-11-07

## 2023-11-07 RX ORDER — EPINEPHRINE 0.3 MG/.3ML
0.3 INJECTION SUBCUTANEOUS ONCE AS NEEDED
Status: DISCONTINUED | OUTPATIENT
Start: 2023-11-07 | End: 2023-11-07 | Stop reason: HOSPADM

## 2023-11-07 RX ORDER — HEPARIN 100 UNIT/ML
500 SYRINGE INTRAVENOUS
Status: DISCONTINUED | OUTPATIENT
Start: 2023-11-07 | End: 2023-11-07 | Stop reason: HOSPADM

## 2023-11-07 RX ORDER — FAMOTIDINE 10 MG/ML
20 INJECTION INTRAVENOUS
Status: COMPLETED | OUTPATIENT
Start: 2023-11-07 | End: 2023-11-07

## 2023-11-07 RX ADMIN — IRINOTECAN HYDROCHLORIDE 260 MG: 20 INJECTION, SOLUTION INTRAVENOUS at 11:11

## 2023-11-07 RX ADMIN — LEUCOVORIN CALCIUM 700 MG: 350 INJECTION, POWDER, LYOPHILIZED, FOR SOLUTION INTRAMUSCULAR; INTRAVENOUS at 09:11

## 2023-11-07 RX ADMIN — FLUOROURACIL 4250 MG: 50 INJECTION, SOLUTION INTRAVENOUS at 02:11

## 2023-11-07 RX ADMIN — FAMOTIDINE 20 MG: 10 INJECTION, SOLUTION INTRAVENOUS at 12:11

## 2023-11-07 RX ADMIN — DIPHENHYDRAMINE HYDROCHLORIDE 25 MG: 50 INJECTION INTRAMUSCULAR; INTRAVENOUS at 12:11

## 2023-11-07 RX ADMIN — DEXTROSE MONOHYDRATE 150 MG: 5 INJECTION, SOLUTION INTRAVENOUS at 09:11

## 2023-11-07 RX ADMIN — PALONOSETRON 0.25 MG: 0.25 INJECTION, SOLUTION INTRAVENOUS at 08:11

## 2023-11-07 NOTE — NURSING
1150: Began to complain of muscle spasms in hands, legs twitching and weakness. Had to go to bathroom in wheelchair.  B/P 183/79-79  Dr. Forman at chairside.  Benadryl 25mg and Pepcide 20mg given IV.  Wrapped in warm blankets  1245: Feeling much better.  Dr. Forman ok'ed restarting Camptosar.  Done.

## 2023-11-09 ENCOUNTER — INFUSION (OUTPATIENT)
Dept: INFUSION THERAPY | Facility: HOSPITAL | Age: 83
End: 2023-11-09
Attending: INTERNAL MEDICINE
Payer: COMMERCIAL

## 2023-11-09 ENCOUNTER — CLINICAL SUPPORT (OUTPATIENT)
Dept: HEMATOLOGY/ONCOLOGY | Facility: CLINIC | Age: 83
End: 2023-11-09
Payer: COMMERCIAL

## 2023-11-09 VITALS
TEMPERATURE: 98 F | WEIGHT: 153.25 LBS | HEART RATE: 68 BPM | SYSTOLIC BLOOD PRESSURE: 134 MMHG | RESPIRATION RATE: 18 BRPM | DIASTOLIC BLOOD PRESSURE: 70 MMHG | HEIGHT: 62 IN | BODY MASS INDEX: 28.2 KG/M2

## 2023-11-09 DIAGNOSIS — C25.9 PANCREATIC ADENOCARCINOMA: Primary | ICD-10-CM

## 2023-11-09 PROCEDURE — 96361 HYDRATE IV INFUSION ADD-ON: CPT

## 2023-11-09 PROCEDURE — 96372 THER/PROPH/DIAG INJ SC/IM: CPT | Mod: 59

## 2023-11-09 PROCEDURE — 63600175 PHARM REV CODE 636 W HCPCS: Mod: JZ,JG | Performed by: INTERNAL MEDICINE

## 2023-11-09 PROCEDURE — 25000003 PHARM REV CODE 250: Performed by: INTERNAL MEDICINE

## 2023-11-09 PROCEDURE — 96360 HYDRATION IV INFUSION INIT: CPT

## 2023-11-09 RX ORDER — HEPARIN 100 UNIT/ML
500 SYRINGE INTRAVENOUS
Status: DISCONTINUED | OUTPATIENT
Start: 2023-11-09 | End: 2023-11-09 | Stop reason: HOSPADM

## 2023-11-09 RX ORDER — SODIUM CHLORIDE 0.9 % (FLUSH) 0.9 %
10 SYRINGE (ML) INJECTION
Status: DISCONTINUED | OUTPATIENT
Start: 2023-11-09 | End: 2023-11-09 | Stop reason: HOSPADM

## 2023-11-09 RX ADMIN — HEPARIN 500 UNITS: 100 SYRINGE at 01:11

## 2023-11-09 RX ADMIN — PEGFILGRASTIM-CBQV 6 MG: 6 INJECTION, SOLUTION SUBCUTANEOUS at 11:11

## 2023-11-09 RX ADMIN — SODIUM CHLORIDE 1000 ML: 9 INJECTION, SOLUTION INTRAVENOUS at 11:11

## 2023-11-09 NOTE — PROGRESS NOTES
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 Regular diet as tolerated; avoid ingestion of cold foods/beverages during oxaliplatin infusion and for 4-5 days following  2. RD to continue monitoring    Nutrition Assessment    10/3/23: This is a 83 y.o.female with a medical diagnosis of pancreatic CA, s/p ERCP a few weeks ago. She reports ~30# wt loss over the past 6 months or so. Prior to ERCP she was having frequent/loose bowel movements, especially after eating. Stool was normal in color, however. She states no c/o n/v/c, no indigestion or bloating now or prior to ERCP. She states her appetite has always been good. She is tolerating a regular diet at this time. She notes that wt loss seems to have slowed in the past couple weeks    11/9/23: Pt here for infusion. She reports relatively good appetite and po intake. She is trying to eat healthy; some of her overall wt loss has been intentional. No c/o n/v/c/d at this time. Her wt has been stable for ~6 weeks.    Nutrition Factors Affecting Intake  none identified    PMHx:  HLD, HTN, osteoporosis    Allergies: Patient has no known allergies.    Current Medications:    Current Outpatient Medications:     aspirin (ECOTRIN) 81 MG EC tablet, Take 1 tablet (81 mg total) by mouth once daily., Disp: 30 tablet, Rfl: 11    cyclobenzaprine HCl (CYCLOBENZAPRINE ORAL), Take 1 tablet by mouth 3 times daily as needed., Disp: , Rfl:     dexAMETHasone (DECADRON) 4 MG Tab, Take 2 tablets by mouth daily on days 2-3 of each chemotherapy cycle., Disp: 6 tablet, Rfl: 5    ergocalciferol (ERGOCALCIFEROL) 50,000 unit Cap, Take 50,000 Units by mouth every 7 days.,  Disp: , Rfl:     gabapentin (NEURONTIN) 300 MG capsule, Take 300-600 mg by mouth every evening., Disp: , Rfl:     lisinopriL-hydrochlorothiazide (PRINZIDE,ZESTORETIC) 10-12.5 mg per tablet, , Disp: , Rfl:     loperamide (IMODIUM) 2 mg capsule, Take 2 capsules by mouth at the onset of diarrhea then 1 capsule every 2 hours until diarrhea free for 12 hours., Disp: 30 capsule, Rfl: 11    metFORMIN (GLUCOPHAGE) 500 MG tablet, Take 500 mg by mouth once daily., Disp: , Rfl:     mirtazapine (REMERON) 7.5 MG Tab, Take 1 tablet (7.5 mg total) by mouth every evening., Disp: 30 tablet, Rfl: 3    mupirocin (BACTROBAN) 2 % ointment, mupirocin 2 % topical ointment, Disp: , Rfl:     ondansetron (ZOFRAN-ODT) 8 MG TbDL, Take 1 tablet (8 mg total) by mouth every 6 (six) hours as needed (Nausea)., Disp: 60 tablet, Rfl: 1    potassium chloride (KLOR-CON) 10 MEQ TbSR, Take 10 mEq by mouth once daily., Disp: , Rfl:     prochlorperazine (COMPAZINE) 5 MG tablet, Take 1 tablet (5 mg total) by mouth every 6 (six) hours as needed for Nausea., Disp: 20 tablet, Rfl: 5    PROCTO-MED HC 2.5 % rectal cream, SMARTSIG:Topical, Disp: , Rfl:     simvastatin (ZOCOR) 40 MG tablet, SMARTSI Tablet(s) By Mouth Every Evening, Disp: , Rfl:     simvastatin (ZOCOR) 40 MG tablet, Take 40 mg by mouth once daily., Disp: , Rfl:     triamcinolone acetonide 0.025% (KENALOG) 0.025 % cream, triamcinolone acetonide 0.025 % topical cream  APPLY TO THE AFFECTED AREA(S) ONCE a DAY, Disp: , Rfl:     valACYclovir (VALTREX) 1000 MG tablet, Take 1 tablet (1,000 mg total) by mouth 2 (two) times daily., Disp: 14 tablet, Rfl: 0  No current facility-administered medications for this visit.    Facility-Administered Medications Ordered in Other Visits:     alteplase injection 2 mg, 2 mg, Intra-Catheter, Dasia ROMO Philippe E II, MD    heparin, porcine (PF) 100 unit/mL injection flush 500 Units, 500 Units, Intravenous, Dasia ROMO Philippe E II, MD, 500 Units at 23 1310    " sodium chloride 0.9% flush 10 mL, 10 mL, Intravenous, PRN, ProuetJaved II, MD    Labs: 11/6 glu 209 (H)    Anthropometrics    Height:   Ht Readings from Last 1 Encounters:   11/09/23 5' 2.21" (1.58 m)      Weight:   Wt Readings from Last 5 Encounters:   11/09/23 69.5 kg (153 lb 3.5 oz)   11/07/23 69.5 kg (153 lb 3.5 oz)   11/06/23 69.5 kg (153 lb 3.5 oz)   10/26/23 69.9 kg (154 lb)   10/24/23 70.3 kg (154 lb 15.7 oz)        Usual Body Weight: 87.4 kg (192 lb)  % Weight Change: -20.3% in 6 months to lowest wt 153 lb    BMI: 27.8 (overweight)    Ideal Weight: 50 kg (110 lb)  % Ideal Weight: 139%    Malnutrition in the context of chronic illness  Degree of Malnutrition: does not meet criteria  Energy Intake: does not meet criteria  Interpretation of Weight Loss: >10% in 6 months  Body Fat: well nourished  Area of Body Fat Loss: does not meet criteria  Muscle Mass Loss: mild depletion  Area of Muscle Mass Loss: temple region - temporalis muscle, clavicle bone region - pectoralis major, deltoid, trapezius muscles, and dorsal hand - interosseous musle  Fluid Accumulation: does not meet criteria  Edema: no edema present  Reduced  Strength: unable to obtain  A minimum of two characteristics is recommended for diagnosis of either severe or non-severe malnutrition.    Estimated Needs (using CBW 69.5 kg)  1474 kcal/day  [place] Jeor x 1.1 activity factor x 1.2 stress factor  83 g protein/day 1.2 g/kg CBW  1474 mL fluid/day 1 mL/kcal    Nutrition Diagnosis    PES: Malnutrition related to chronic illness as evidenced by >10% wt loss 6 months, mild muscle depletion. (resolved)     Nutrition Risk  low    Nutrition Intervention    Interventions(treatment strategy):  modified composition of meals/snacks      Nutrition Monitoring and Evaluation    Ongoing nutrition monitoring not warranted at this time. Please consult RD prn.         Trinity Rubin, MS, RD, , LDN                                                         "

## 2023-11-14 ENCOUNTER — OFFICE VISIT (OUTPATIENT)
Dept: HEMATOLOGY/ONCOLOGY | Facility: CLINIC | Age: 83
End: 2023-11-14
Payer: COMMERCIAL

## 2023-11-14 DIAGNOSIS — C25.9 PANCREATIC ADENOCARCINOMA: Primary | ICD-10-CM

## 2023-11-14 PROCEDURE — 3288F PR FALLS RISK ASSESSMENT DOCUMENTED: ICD-10-PCS | Mod: CPTII,95,, | Performed by: NURSE PRACTITIONER

## 2023-11-14 PROCEDURE — 1159F MED LIST DOCD IN RCRD: CPT | Mod: CPTII,95,, | Performed by: NURSE PRACTITIONER

## 2023-11-14 PROCEDURE — 1101F PT FALLS ASSESS-DOCD LE1/YR: CPT | Mod: CPTII,95,, | Performed by: NURSE PRACTITIONER

## 2023-11-14 PROCEDURE — 99213 PR OFFICE/OUTPT VISIT, EST, LEVL III, 20-29 MIN: ICD-10-PCS | Mod: 95,,, | Performed by: NURSE PRACTITIONER

## 2023-11-14 PROCEDURE — 1101F PR PT FALLS ASSESS DOC 0-1 FALLS W/OUT INJ PAST YR: ICD-10-PCS | Mod: CPTII,95,, | Performed by: NURSE PRACTITIONER

## 2023-11-14 PROCEDURE — 3288F FALL RISK ASSESSMENT DOCD: CPT | Mod: CPTII,95,, | Performed by: NURSE PRACTITIONER

## 2023-11-14 PROCEDURE — 99213 OFFICE O/P EST LOW 20 MIN: CPT | Mod: 95,,, | Performed by: NURSE PRACTITIONER

## 2023-11-14 PROCEDURE — 1159F PR MEDICATION LIST DOCUMENTED IN MEDICAL RECORD: ICD-10-PCS | Mod: CPTII,95,, | Performed by: NURSE PRACTITIONER

## 2023-11-14 NOTE — PROGRESS NOTES
REFERRING PROVIDER: Dr. Javed Forman    Subjective:       Patient ID: Darby Gary is a 83 y.o. female.    Chief Complaint:  Personal history of recently diagnosed pancreatic cancer and a family history of cancer. Patient presented for genetic counseling on 10/18/2023 and was found appropriate for genetic testing based on the National Comprehensive Cancer Network (NCCN) criteria due to a personal history of pancreatic cancer whereas genetic test result could influence treatment, with additional evidence due to family history that includes breast cancer (niece dx48y, maternal aunts x 3) and prostate cancer (brother) (see family history and pedigree). Patient signed consent, lab was drawn and sent to [facility] for CancerNext-Expanded+RNAinsight panel testing. Patient presented via Telemedicine Visit (audio only) today for results disclosure.     HPI  Past Medical History:   Diagnosis Date    Arthritis     Diabetes mellitus     Hyperlipidemia     Hypertension     Osteoporosis     Pancreatic cancer         Past Surgical History:   Procedure Laterality Date    COLONOSCOPY      DIAGNOSTIC LAPAROSCOPY N/A 10/9/2023    Procedure: LAPAROSCOPY, DIAGNOSTIC  Req 12/12:30;  Surgeon: Chucky Olguin MD;  Location: [facility] OR;  Service: General;  Laterality: N/A;    ERCP N/A 9/13/2023    Procedure: ERCP (ENDOSCOPIC RETROGRADE CHOLANGIOPANCREATOGRAPHY)/ cytology brushing;  Surgeon: Erika Ulloa MD;  Location: [facility] ENDOSCOPY;  Service: Gastroenterology;  Laterality: N/A;    ERCP W/ SPHICTEROTOMY  9/13/2023    Procedure: ERCP, WITH SPHINCTEROTOMY;  Surgeon: Erika Ulloa MD;  Location: [facility] ENDOSCOPY;  Service: Gastroenterology;;    ERCP, WITH STENT INSERTION  9/13/2023    Procedure: ERCP, WITH STENT INSERTION;  Surgeon: Erika Ulloa MD;  Location: [facility] ENDOSCOPY;  Service: Gastroenterology;;    ESOPHAGOGASTRODUODENOSCOPY N/A 9/13/2023    Procedure: EGD (ESOPHAGOGASTRODUODENOSCOPY);  Surgeon:  Erika Ulloa MD;  Location: [facility] ENDOSCOPY;  Service: Gastroenterology;  Laterality: N/A;    PLACEMENT, MEDIPORT Right 10/9/2023    Procedure: PLACEMENT, MEDIPORT;  Surgeon: Chucky Olguin MD;  Location: [facility] OR;  Service: General;  Laterality: Right;        Review of patient's allergies indicates:  No Known Allergies     Review of Systems            Problem List Items Addressed This Visit    None    Oncology History   Pancreatic adenocarcinoma   9/25/2023 Initial Diagnosis    Pancreatic adenocarcinoma     10/10/2023 -  Chemotherapy    Treatment Summary   Plan Name: OP Panc mFOLFIRINOX (oxaliplatin leucovorin irinotecan fluorouracil) Q2W  Treatment Goal: Control  Status: Active  Start Date: 10/10/2023  End Date: 3/14/2024 (Planned)  Provider: Javed Forman II, MD  Chemotherapy: irinotecan (CAMPTOSAR) 260 mg in sodium chloride 0.9% 578 mL chemo infusion, 266 mg, Intravenous, Clinic/HOD 1 time, 3 of 12 cycles  Administration: 260 mg (10/10/2023), 260 mg (10/24/2023), 260 mg (11/7/2023)  oxaliplatin (ELOXATIN) 85 mg/m2 = 150 mg in dextrose 5 % (D5W) 595 mL chemo infusion, 85 mg/m2 = 150 mg, Intravenous, Clinic/HOD 1 time, 3 of 12 cycles  Administration: 150 mg (10/10/2023), 150 mg (10/24/2023), 150 mg (11/7/2023)              Family History   Problem Relation Age of Onset    Leukemia Father     Prostate cancer Brother     Breast cancer Other 48    Breast cancer Maternal Aunt     Breast cancer Maternal Aunt     Breast cancer Maternal Aunt     Cancer Paternal Uncle         Assessment:   Genetic testing was appropriate for this patient because of personal and family history. This comprehensive [employer] CancerNext-Expanded analysis indicated that there was no deleterious mutation and no variants of uncertain significance found in (77 total): AIP, ALK, APC*, ROMEO*, AXIN2, BAP1, BARD1, BLM, BMPR1A, BRCA1*, BRCA2*, BRIP1*, CDC73, CDH1*, CDK4, CDKN1B, CDKN2A, CHEK2*, CTNNA1, DICER1, FANCC, FH, FLCN,  GALNT12, KIF1B, LZTR1, MAX, MEN1, MET, MLH1*, MSH2*, MSH3, MSH6*, MUTYH*, NBN, NF1*, NF2, NTHL1, PALB2*, PHOX2B, PMS2*, POT1, BJUWV0X, PTCH1, PTEN*, RAD51C*, RAD51D*, RB1, RECQL, RET, SDHA, SDHAF2, SDHB, SDHC, SDHD, SMAD4, SMARCA4, SMARCB1, SMARCE1, STK11, SUFU, PMLP405, TP53*, TSC1, TSC2, VHL and XRCC2 (sequencing and deletion/duplication); EGFR, EGLN1, HOXB13, KIT, MITF, PDGFRA, POLD1 and POLE (sequencing only); EPCAM and GREM1 (deletion/duplication only). DNA and RNA analyses performed for * genes.    It was explained to the patient, that, although this analysis indicated a negative result, there are other, rare genetic abnormalities that this test will not detect. This result, however, rules out the majority of abnormalities believed to be responsible for hereditary susceptibility to cancer.    A copy of the result will be emailed to the patient: [email or web address] and [email or web address]     The patient location is: home    Visit type: audio only    Time with patient: 10 minutes  20 minutes of total time spent on the encounter, which includes face to face time and non-face to face time preparing to see the patient (eg, review of tests), Obtaining and/or reviewing separately obtained history, Documenting clinical information in the electronic or other health record, Independently interpreting results (not separately reported) and communicating results to the patient/family/caregiver, or Care coordination (not separately reported).         Each patient to whom he or she provides medical services by telemedicine is:  (1) informed of the relationship between the physician and patient and the respective role of any other health care provider with respect to management of the patient; and (2) notified that he or she may decline to receive medical services by telemedicine and may withdraw from such care at any time.    MARTINE DELACRUZ, PhD

## 2023-11-17 ENCOUNTER — LAB VISIT (OUTPATIENT)
Dept: LAB | Facility: HOSPITAL | Age: 83
End: 2023-11-17
Attending: INTERNAL MEDICINE
Payer: COMMERCIAL

## 2023-11-17 DIAGNOSIS — C77.2 MALIGNANT NEOPLASM METASTATIC TO INTRA-ABDOMINAL LYMPH NODE: ICD-10-CM

## 2023-11-17 DIAGNOSIS — C25.9 PANCREATIC ADENOCARCINOMA: ICD-10-CM

## 2023-11-17 LAB
ALBUMIN SERPL-MCNC: 3.6 G/DL (ref 3.4–4.8)
ALBUMIN/GLOB SERPL: 1.3 RATIO (ref 1.1–2)
ALP SERPL-CCNC: 146 UNIT/L (ref 40–150)
ALT SERPL-CCNC: 19 UNIT/L (ref 0–55)
AST SERPL-CCNC: 23 UNIT/L (ref 5–34)
BASOPHILS # BLD AUTO: 0.06 X10(3)/MCL
BASOPHILS NFR BLD AUTO: 0.3 %
BILIRUB SERPL-MCNC: 0.4 MG/DL
BUN SERPL-MCNC: 14.1 MG/DL (ref 9.8–20.1)
CALCIUM SERPL-MCNC: 8.9 MG/DL (ref 8.4–10.2)
CANCER AG19-9 SERPL-ACNC: 1157.6 UNIT/ML (ref 0–37)
CHLORIDE SERPL-SCNC: 102 MMOL/L (ref 98–107)
CO2 SERPL-SCNC: 27 MMOL/L (ref 23–31)
CREAT SERPL-MCNC: 0.81 MG/DL (ref 0.55–1.02)
EOSINOPHIL # BLD AUTO: 0.26 X10(3)/MCL (ref 0–0.9)
EOSINOPHIL NFR BLD AUTO: 1.3 %
ERYTHROCYTE [DISTWIDTH] IN BLOOD BY AUTOMATED COUNT: 14.3 % (ref 11.5–17)
GFR SERPLBLD CREATININE-BSD FMLA CKD-EPI: >60 MLS/MIN/1.73/M2
GLOBULIN SER-MCNC: 2.7 GM/DL (ref 2.4–3.5)
GLUCOSE SERPL-MCNC: 207 MG/DL (ref 82–115)
HCT VFR BLD AUTO: 39 % (ref 37–47)
HGB BLD-MCNC: 12.1 G/DL (ref 12–16)
IMM GRANULOCYTES # BLD AUTO: 0.3 X10(3)/MCL (ref 0–0.04)
IMM GRANULOCYTES NFR BLD AUTO: 1.5 %
LYMPHOCYTES # BLD AUTO: 3.54 X10(3)/MCL (ref 0.6–4.6)
LYMPHOCYTES NFR BLD AUTO: 17.4 %
MAGNESIUM SERPL-MCNC: 1.8 MG/DL (ref 1.6–2.6)
MCH RBC QN AUTO: 28.5 PG (ref 27–31)
MCHC RBC AUTO-ENTMCNC: 31 G/DL (ref 33–36)
MCV RBC AUTO: 91.8 FL (ref 80–94)
MONOCYTES # BLD AUTO: 1.81 X10(3)/MCL (ref 0.1–1.3)
MONOCYTES NFR BLD AUTO: 8.9 %
NEUTROPHILS # BLD AUTO: 14.42 X10(3)/MCL (ref 2.1–9.2)
NEUTROPHILS NFR BLD AUTO: 70.6 %
PLATELET # BLD AUTO: 241 X10(3)/MCL (ref 130–400)
PMV BLD AUTO: 9.6 FL (ref 7.4–10.4)
POTASSIUM SERPL-SCNC: 3.4 MMOL/L (ref 3.5–5.1)
PROT SERPL-MCNC: 6.3 GM/DL (ref 5.8–7.6)
RBC # BLD AUTO: 4.25 X10(6)/MCL (ref 4.2–5.4)
SODIUM SERPL-SCNC: 139 MMOL/L (ref 136–145)
WBC # SPEC AUTO: 20.39 X10(3)/MCL (ref 4.5–11.5)

## 2023-11-17 PROCEDURE — 36415 COLL VENOUS BLD VENIPUNCTURE: CPT

## 2023-11-17 PROCEDURE — 85025 COMPLETE CBC W/AUTO DIFF WBC: CPT

## 2023-11-17 PROCEDURE — 86301 IMMUNOASSAY TUMOR CA 19-9: CPT

## 2023-11-17 PROCEDURE — 83735 ASSAY OF MAGNESIUM: CPT

## 2023-11-17 PROCEDURE — 80053 COMPREHEN METABOLIC PANEL: CPT

## 2023-11-17 RX ORDER — DIPHENHYDRAMINE HYDROCHLORIDE 50 MG/ML
50 INJECTION INTRAMUSCULAR; INTRAVENOUS ONCE AS NEEDED
Status: CANCELLED | OUTPATIENT
Start: 2023-11-21

## 2023-11-17 RX ORDER — FLUOROURACIL 50 MG/ML
2400 INJECTION, SOLUTION INTRAVENOUS
Status: CANCELLED | OUTPATIENT
Start: 2023-11-21

## 2023-11-17 RX ORDER — EPINEPHRINE 0.3 MG/.3ML
0.3 INJECTION SUBCUTANEOUS ONCE AS NEEDED
Status: CANCELLED | OUTPATIENT
Start: 2023-11-21

## 2023-11-17 RX ORDER — SODIUM CHLORIDE 0.9 % (FLUSH) 0.9 %
10 SYRINGE (ML) INJECTION
Status: CANCELLED | OUTPATIENT
Start: 2023-11-23

## 2023-11-17 RX ORDER — ATROPINE SULFATE 0.4 MG/ML
0.4 INJECTION, SOLUTION ENDOTRACHEAL; INTRAMEDULLARY; INTRAMUSCULAR; INTRAVENOUS; SUBCUTANEOUS ONCE AS NEEDED
Status: CANCELLED | OUTPATIENT
Start: 2023-11-21

## 2023-11-17 RX ORDER — HEPARIN 100 UNIT/ML
500 SYRINGE INTRAVENOUS
Status: CANCELLED | OUTPATIENT
Start: 2023-11-23

## 2023-11-17 RX ORDER — HEPARIN 100 UNIT/ML
500 SYRINGE INTRAVENOUS
Status: CANCELLED | OUTPATIENT
Start: 2023-11-21

## 2023-11-17 RX ORDER — SODIUM CHLORIDE 0.9 % (FLUSH) 0.9 %
10 SYRINGE (ML) INJECTION
Status: CANCELLED | OUTPATIENT
Start: 2023-11-21

## 2023-11-17 RX ORDER — PROCHLORPERAZINE EDISYLATE 5 MG/ML
5 INJECTION INTRAMUSCULAR; INTRAVENOUS ONCE AS NEEDED
Status: CANCELLED | OUTPATIENT
Start: 2023-11-21

## 2023-11-20 ENCOUNTER — INFUSION (OUTPATIENT)
Dept: INFUSION THERAPY | Facility: HOSPITAL | Age: 83
End: 2023-11-20
Attending: INTERNAL MEDICINE
Payer: COMMERCIAL

## 2023-11-20 ENCOUNTER — DOCUMENTATION ONLY (OUTPATIENT)
Dept: HEMATOLOGY/ONCOLOGY | Facility: CLINIC | Age: 83
End: 2023-11-20
Payer: COMMERCIAL

## 2023-11-20 VITALS
DIASTOLIC BLOOD PRESSURE: 72 MMHG | OXYGEN SATURATION: 98 % | SYSTOLIC BLOOD PRESSURE: 133 MMHG | HEIGHT: 62 IN | RESPIRATION RATE: 18 BRPM | TEMPERATURE: 98 F | BODY MASS INDEX: 27.35 KG/M2 | HEART RATE: 74 BPM | WEIGHT: 148.63 LBS

## 2023-11-20 DIAGNOSIS — C25.9 PANCREATIC ADENOCARCINOMA: Primary | ICD-10-CM

## 2023-11-20 PROCEDURE — 96368 THER/DIAG CONCURRENT INF: CPT

## 2023-11-20 PROCEDURE — 25000003 PHARM REV CODE 250: Performed by: NURSE PRACTITIONER

## 2023-11-20 PROCEDURE — 96413 CHEMO IV INFUSION 1 HR: CPT

## 2023-11-20 PROCEDURE — 96417 CHEMO IV INFUS EACH ADDL SEQ: CPT

## 2023-11-20 PROCEDURE — 96367 TX/PROPH/DG ADDL SEQ IV INF: CPT

## 2023-11-20 PROCEDURE — 96415 CHEMO IV INFUSION ADDL HR: CPT

## 2023-11-20 PROCEDURE — 96375 TX/PRO/DX INJ NEW DRUG ADDON: CPT

## 2023-11-20 PROCEDURE — 63600175 PHARM REV CODE 636 W HCPCS: Performed by: NURSE PRACTITIONER

## 2023-11-20 RX ORDER — ATROPINE SULFATE 0.4 MG/ML
0.4 INJECTION, SOLUTION ENDOTRACHEAL; INTRAMEDULLARY; INTRAMUSCULAR; INTRAVENOUS; SUBCUTANEOUS ONCE AS NEEDED
Status: COMPLETED | OUTPATIENT
Start: 2023-11-20 | End: 2023-11-20

## 2023-11-20 RX ORDER — HEPARIN 100 UNIT/ML
500 SYRINGE INTRAVENOUS
Status: DISCONTINUED | OUTPATIENT
Start: 2023-11-20 | End: 2023-11-20 | Stop reason: HOSPADM

## 2023-11-20 RX ORDER — DIPHENHYDRAMINE HYDROCHLORIDE 50 MG/ML
25 INJECTION INTRAMUSCULAR; INTRAVENOUS
Status: COMPLETED | OUTPATIENT
Start: 2023-11-20 | End: 2023-11-20

## 2023-11-20 RX ORDER — FAMOTIDINE 10 MG/ML
20 INJECTION INTRAVENOUS
Status: COMPLETED | OUTPATIENT
Start: 2023-11-20 | End: 2023-11-20

## 2023-11-20 RX ORDER — EPINEPHRINE 0.3 MG/.3ML
0.3 INJECTION SUBCUTANEOUS ONCE AS NEEDED
Status: DISCONTINUED | OUTPATIENT
Start: 2023-11-20 | End: 2023-11-20 | Stop reason: HOSPADM

## 2023-11-20 RX ORDER — SODIUM CHLORIDE 0.9 % (FLUSH) 0.9 %
10 SYRINGE (ML) INJECTION
Status: DISCONTINUED | OUTPATIENT
Start: 2023-11-20 | End: 2023-11-20 | Stop reason: HOSPADM

## 2023-11-20 RX ORDER — DIPHENHYDRAMINE HYDROCHLORIDE 50 MG/ML
50 INJECTION INTRAMUSCULAR; INTRAVENOUS ONCE AS NEEDED
Status: DISCONTINUED | OUTPATIENT
Start: 2023-11-20 | End: 2023-11-20 | Stop reason: HOSPADM

## 2023-11-20 RX ORDER — PROCHLORPERAZINE EDISYLATE 5 MG/ML
5 INJECTION INTRAMUSCULAR; INTRAVENOUS ONCE AS NEEDED
Status: DISCONTINUED | OUTPATIENT
Start: 2023-11-20 | End: 2023-11-20 | Stop reason: HOSPADM

## 2023-11-20 RX ADMIN — OXALIPLATIN 150 MG: 100 INJECTION, SOLUTION, CONCENTRATE INTRAVENOUS at 10:11

## 2023-11-20 RX ADMIN — SODIUM CHLORIDE: 9 INJECTION, SOLUTION INTRAVENOUS at 09:11

## 2023-11-20 RX ADMIN — ATROPINE SULFATE 0.4 MG: 0.4 INJECTION, SOLUTION INTRAVENOUS at 12:11

## 2023-11-20 RX ADMIN — DEXTROSE MONOHYDRATE: 50 INJECTION, SOLUTION INTRAVENOUS at 10:11

## 2023-11-20 RX ADMIN — DIPHENHYDRAMINE HYDROCHLORIDE 25 MG: 50 INJECTION INTRAMUSCULAR; INTRAVENOUS at 12:11

## 2023-11-20 RX ADMIN — SODIUM CHLORIDE 260 MG: 9 INJECTION, SOLUTION INTRAVENOUS at 12:11

## 2023-11-20 RX ADMIN — DEXTROSE 700 MG: 5 SOLUTION INTRAVENOUS at 12:11

## 2023-11-20 RX ADMIN — FLUOROURACIL 4250 MG: 50 INJECTION, SOLUTION INTRAVENOUS at 02:11

## 2023-11-20 RX ADMIN — PALONOSETRON 0.25 MG: 0.25 INJECTION, SOLUTION INTRAVENOUS at 09:11

## 2023-11-20 RX ADMIN — FAMOTIDINE 20 MG: 10 INJECTION, SOLUTION INTRAVENOUS at 12:11

## 2023-11-20 NOTE — PLAN OF CARE
Plan of care reviewed with patient; patient in agreement. C4D1 infused. Benardryl 25mg and Pepcid 20mg added for previous reaction to Irinotecan; Atropine also given. Appts reviewed with patient.

## 2023-11-20 NOTE — NURSING
"I met with Darby in the infusion clinic. 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 I asked Darby if she is experiencing any depression or anxiety.  Darby rated her depression and anxiety as a "2" on a 10 point scale. I asked Darby if there were any questions or concerns for Parth and she denied. I encouraged Darby to reach out to Parth if needed and provided her with Parth's contact information.     Cammy BRADLEY, Intern    Approved by: Parth Bell LCSW  "

## 2023-11-22 ENCOUNTER — INFUSION (OUTPATIENT)
Dept: INFUSION THERAPY | Facility: HOSPITAL | Age: 83
End: 2023-11-22
Attending: INTERNAL MEDICINE
Payer: COMMERCIAL

## 2023-11-22 VITALS
DIASTOLIC BLOOD PRESSURE: 76 MMHG | TEMPERATURE: 98 F | WEIGHT: 148.63 LBS | HEIGHT: 62 IN | HEART RATE: 68 BPM | BODY MASS INDEX: 27.35 KG/M2 | RESPIRATION RATE: 18 BRPM | SYSTOLIC BLOOD PRESSURE: 131 MMHG | OXYGEN SATURATION: 98 %

## 2023-11-22 DIAGNOSIS — C25.9 PANCREATIC ADENOCARCINOMA: Primary | ICD-10-CM

## 2023-11-22 PROCEDURE — A4216 STERILE WATER/SALINE, 10 ML: HCPCS | Performed by: NURSE PRACTITIONER

## 2023-11-22 PROCEDURE — 96360 HYDRATION IV INFUSION INIT: CPT

## 2023-11-22 PROCEDURE — 25000003 PHARM REV CODE 250: Performed by: NURSE PRACTITIONER

## 2023-11-22 PROCEDURE — 63600175 PHARM REV CODE 636 W HCPCS: Performed by: NURSE PRACTITIONER

## 2023-11-22 RX ORDER — SODIUM CHLORIDE 0.9 % (FLUSH) 0.9 %
10 SYRINGE (ML) INJECTION
Status: DISCONTINUED | OUTPATIENT
Start: 2023-11-22 | End: 2023-11-22 | Stop reason: HOSPADM

## 2023-11-22 RX ORDER — HEPARIN 100 UNIT/ML
500 SYRINGE INTRAVENOUS
Status: DISCONTINUED | OUTPATIENT
Start: 2023-11-22 | End: 2023-11-22 | Stop reason: HOSPADM

## 2023-11-22 RX ADMIN — HEPARIN 500 UNITS: 100 SYRINGE at 12:11

## 2023-11-22 RX ADMIN — SODIUM CHLORIDE 1000 ML: 9 INJECTION, SOLUTION INTRAVENOUS at 10:11

## 2023-11-22 RX ADMIN — Medication 10 ML: at 12:11

## 2023-11-22 NOTE — PLAN OF CARE
Plan of care reviewed with patient; patient in agreement. C4D3 CADD pump DC + 1L NS; Udenyca held per NP. Appts reviewed; printed and given to patient.

## 2023-11-27 ENCOUNTER — TELEPHONE (OUTPATIENT)
Dept: HEMATOLOGY/ONCOLOGY | Facility: CLINIC | Age: 83
End: 2023-11-27
Payer: COMMERCIAL

## 2023-11-27 NOTE — TELEPHONE ENCOUNTER
Patient concerned about current bp. States it is 115/70 this morning. I informed patient that this was stable bp reading. Advised that she force fluids and make sure that she is staying hydrated. She voiced understanding.

## 2023-12-04 ENCOUNTER — CLINICAL SUPPORT (OUTPATIENT)
Dept: HEMATOLOGY/ONCOLOGY | Facility: CLINIC | Age: 83
End: 2023-12-04
Payer: COMMERCIAL

## 2023-12-04 ENCOUNTER — OFFICE VISIT (OUTPATIENT)
Dept: HEMATOLOGY/ONCOLOGY | Facility: CLINIC | Age: 83
End: 2023-12-04
Payer: COMMERCIAL

## 2023-12-04 VITALS
BODY MASS INDEX: 27.02 KG/M2 | HEART RATE: 75 BPM | OXYGEN SATURATION: 98 % | RESPIRATION RATE: 18 BRPM | SYSTOLIC BLOOD PRESSURE: 144 MMHG | HEIGHT: 62 IN | DIASTOLIC BLOOD PRESSURE: 83 MMHG | WEIGHT: 146.81 LBS

## 2023-12-04 DIAGNOSIS — C77.2 MALIGNANT NEOPLASM METASTATIC TO INTRA-ABDOMINAL LYMPH NODE: ICD-10-CM

## 2023-12-04 DIAGNOSIS — C25.9 PANCREATIC ADENOCARCINOMA: Primary | ICD-10-CM

## 2023-12-04 DIAGNOSIS — Z79.899 ON ANTINEOPLASTIC CHEMOTHERAPY: ICD-10-CM

## 2023-12-04 PROCEDURE — 1159F PR MEDICATION LIST DOCUMENTED IN MEDICAL RECORD: ICD-10-PCS | Mod: CPTII,S$GLB,, | Performed by: NURSE PRACTITIONER

## 2023-12-04 PROCEDURE — 3077F SYST BP >= 140 MM HG: CPT | Mod: CPTII,S$GLB,, | Performed by: NURSE PRACTITIONER

## 2023-12-04 PROCEDURE — 1159F MED LIST DOCD IN RCRD: CPT | Mod: CPTII,S$GLB,, | Performed by: NURSE PRACTITIONER

## 2023-12-04 PROCEDURE — 99999 PR PBB SHADOW E&M-EST. PATIENT-LVL IV: CPT | Mod: PBBFAC,,, | Performed by: NURSE PRACTITIONER

## 2023-12-04 PROCEDURE — 99215 OFFICE O/P EST HI 40 MIN: CPT | Mod: S$GLB,,, | Performed by: NURSE PRACTITIONER

## 2023-12-04 PROCEDURE — 3079F PR MOST RECENT DIASTOLIC BLOOD PRESSURE 80-89 MM HG: ICD-10-PCS | Mod: CPTII,S$GLB,, | Performed by: NURSE PRACTITIONER

## 2023-12-04 PROCEDURE — 99999 PR PBB SHADOW E&M-EST. PATIENT-LVL IV: ICD-10-PCS | Mod: PBBFAC,,, | Performed by: NURSE PRACTITIONER

## 2023-12-04 PROCEDURE — 3077F PR MOST RECENT SYSTOLIC BLOOD PRESSURE >= 140 MM HG: ICD-10-PCS | Mod: CPTII,S$GLB,, | Performed by: NURSE PRACTITIONER

## 2023-12-04 PROCEDURE — 3079F DIAST BP 80-89 MM HG: CPT | Mod: CPTII,S$GLB,, | Performed by: NURSE PRACTITIONER

## 2023-12-04 PROCEDURE — 99215 PR OFFICE/OUTPT VISIT, EST, LEVL V, 40-54 MIN: ICD-10-PCS | Mod: S$GLB,,, | Performed by: NURSE PRACTITIONER

## 2023-12-04 RX ORDER — HEPARIN 100 UNIT/ML
500 SYRINGE INTRAVENOUS
Status: CANCELLED | OUTPATIENT
Start: 2023-12-05

## 2023-12-04 RX ORDER — DIPHENHYDRAMINE HYDROCHLORIDE 50 MG/ML
50 INJECTION INTRAMUSCULAR; INTRAVENOUS ONCE AS NEEDED
Status: CANCELLED | OUTPATIENT
Start: 2023-12-05

## 2023-12-04 RX ORDER — HEPARIN 100 UNIT/ML
500 SYRINGE INTRAVENOUS
Status: CANCELLED | OUTPATIENT
Start: 2023-12-07

## 2023-12-04 RX ORDER — DIPHENHYDRAMINE HYDROCHLORIDE 50 MG/ML
25 INJECTION INTRAMUSCULAR; INTRAVENOUS
Status: CANCELLED
Start: 2023-12-05

## 2023-12-04 RX ORDER — PROCHLORPERAZINE EDISYLATE 5 MG/ML
5 INJECTION INTRAMUSCULAR; INTRAVENOUS ONCE AS NEEDED
Status: CANCELLED | OUTPATIENT
Start: 2023-12-05

## 2023-12-04 RX ORDER — SODIUM CHLORIDE 0.9 % (FLUSH) 0.9 %
10 SYRINGE (ML) INJECTION
Status: CANCELLED | OUTPATIENT
Start: 2023-12-05

## 2023-12-04 RX ORDER — FAMOTIDINE 10 MG/ML
20 INJECTION INTRAVENOUS
Status: CANCELLED
Start: 2023-12-05

## 2023-12-04 RX ORDER — FLUOROURACIL 50 MG/ML
2400 INJECTION, SOLUTION INTRAVENOUS
Status: CANCELLED | OUTPATIENT
Start: 2023-12-05

## 2023-12-04 RX ORDER — ATROPINE SULFATE 0.4 MG/ML
0.4 INJECTION, SOLUTION ENDOTRACHEAL; INTRAMEDULLARY; INTRAMUSCULAR; INTRAVENOUS; SUBCUTANEOUS ONCE AS NEEDED
Status: CANCELLED | OUTPATIENT
Start: 2023-12-05

## 2023-12-04 RX ORDER — EPINEPHRINE 0.3 MG/.3ML
0.3 INJECTION SUBCUTANEOUS ONCE AS NEEDED
Status: CANCELLED | OUTPATIENT
Start: 2023-12-05

## 2023-12-04 RX ORDER — SODIUM CHLORIDE 0.9 % (FLUSH) 0.9 %
10 SYRINGE (ML) INJECTION
Status: CANCELLED | OUTPATIENT
Start: 2023-12-07

## 2023-12-04 NOTE — PROGRESS NOTES
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 High kcal/high protein diet as tolerated; avoid ingestion of cold foods/beverages during oxaliplatin infusion and for 4-5 days following  2. Trial of Creon per NP. Recommend 36,000 units 1 cap with meals 1 cap with snacks  3. Oral supplement of choice at least once daily  4. RD to continue monitoring    Nutrition Assessment    10/3/23: This is a 83 y.o.female with a medical diagnosis of pancreatic CA, s/p ERCP a few weeks ago. She reports ~30# wt loss over the past 6 months or so. Prior to ERCP she was having frequent/loose bowel movements, especially after eating. Stool was normal in color, however. She states no c/o n/v/c, no indigestion or bloating now or prior to ERCP. She states her appetite has always been good. She is tolerating a regular diet at this time. She notes that wt loss seems to have slowed in the past couple weeks    11/9/23: Pt here for infusion. She reports relatively good appetite and po intake. She is trying to eat healthy; some of her overall wt loss has been intentional. No c/o n/v/c/d at this time. Her wt has been stable for ~6 weeks.     12/4/23: Pt here for NP f/u. She notes some days with good appetite and others it is decreased. She has noticed dysgeusia following each cycle of treatment that improves over time. Also noting some "gurgling," possible indigestion/maldigestion? NP discussed pancreatic enzyme trial and pt is amenable. I did provide high kcal/high protein snack list for pt for days when her appetite is lower. She did have wt loss earlier in the year that had stabilized in recent months but she has begun to lose wt again. Total wt loss " since April is ~45# (~14# in the past 6-8 weeks).    Nutrition Factors Affecting Intake  altered taste and decreased appetite    PMHx: HLD, HTN, osteoporosis     Allergies: Patient has no known allergies.    Current Medications:    Current Outpatient Medications:     aspirin (ECOTRIN) 81 MG EC tablet, Take 1 tablet (81 mg total) by mouth once daily., Disp: 30 tablet, Rfl: 11    cyclobenzaprine HCl (CYCLOBENZAPRINE ORAL), Take 1 tablet by mouth 3 times daily as needed., Disp: , Rfl:     dexAMETHasone (DECADRON) 4 MG Tab, Take 2 tablets by mouth daily on days 2-3 of each chemotherapy cycle., Disp: 6 tablet, Rfl: 5    ergocalciferol (ERGOCALCIFEROL) 50,000 unit Cap, Take 50,000 Units by mouth every 7 days., Disp: , Rfl:     gabapentin (NEURONTIN) 300 MG capsule, Take 300-600 mg by mouth every evening., Disp: , Rfl:     lisinopriL-hydrochlorothiazide (PRINZIDE,ZESTORETIC) 10-12.5 mg per tablet, , Disp: , Rfl:     loperamide (IMODIUM) 2 mg capsule, Take 2 capsules by mouth at the onset of diarrhea then 1 capsule every 2 hours until diarrhea free for 12 hours., Disp: 30 capsule, Rfl: 11    metFORMIN (GLUCOPHAGE) 500 MG tablet, Take 500 mg by mouth once daily., Disp: , Rfl:     mirtazapine (REMERON) 7.5 MG Tab, Take 1 tablet (7.5 mg total) by mouth every evening., Disp: 30 tablet, Rfl: 3    mupirocin (BACTROBAN) 2 % ointment, mupirocin 2 % topical ointment, Disp: , Rfl:     ondansetron (ZOFRAN-ODT) 8 MG TbDL, Take 1 tablet (8 mg total) by mouth every 6 (six) hours as needed (Nausea)., Disp: 60 tablet, Rfl: 1    potassium chloride (KLOR-CON) 10 MEQ TbSR, Take 10 mEq by mouth once daily., Disp: , Rfl:     prochlorperazine (COMPAZINE) 5 MG tablet, Take 1 tablet (5 mg total) by mouth every 6 (six) hours as needed for Nausea., Disp: 20 tablet, Rfl: 5    PROCTO-MED HC 2.5 % rectal cream, SMARTSIG:Topical, Disp: , Rfl:     simvastatin (ZOCOR) 40 MG tablet, SMARTSI Tablet(s) By Mouth Every Evening, Disp: , Rfl:     simvastatin  "(ZOCOR) 40 MG tablet, Take 40 mg by mouth once daily., Disp: , Rfl:     triamcinolone acetonide 0.025% (KENALOG) 0.025 % cream, triamcinolone acetonide 0.025 % topical cream  APPLY TO THE AFFECTED AREA(S) ONCE a DAY, Disp: , Rfl:     valACYclovir (VALTREX) 1000 MG tablet, Take 1 tablet (1,000 mg total) by mouth 2 (two) times daily., Disp: 14 tablet, Rfl: 0    Labs: CMP not resulted at time of visit    Anthropometrics    Height:   Ht Readings from Last 1 Encounters:   12/04/23 5' 2" (1.575 m)      Weight:   Wt Readings from Last 5 Encounters:   12/04/23 66.6 kg (146 lb 13.2 oz)   11/22/23 67.4 kg (148 lb 9.6 oz)   11/20/23 67.4 kg (148 lb 9.6 oz)   11/09/23 69.5 kg (153 lb 3.5 oz)   11/07/23 69.5 kg (153 lb 3.5 oz)        Usual Body Weight: 87.4 kg (192 lb)   % Weight Change: -24% in 8 months to lowest wt 146 lb    BMI: 26.8 (overweight)    Ideal Weight: 50 kg (110 lb)  % Ideal Weight: 133%    Malnutrition in the context of acute illness or injury  Degree of Malnutrition: non-severe (moderate) malnutrition  Energy Intake: < 75% of estimated energy requirement for > 7 days  Interpretation of Weight Loss: 5% In 1 month  Body Fat:mild depletion  Area of Body Fat Loss: orbital region , upper arm region - triceps / biceps, and thoracic and lumbar region - ribs, lower back, midaxillary line  Muscle Mass Loss: mild depletion  Area of Muscle Mass Loss: temple region - temporalis muscle, clavicle bone region - pectoralis major, deltoid, trapezius muscles, clavicle and acromion bone region - deltoid muscle, scapular bone region - trapezius, supraspinus, infraspinus muscles, dorsal hand - interosseous musle, patellar region - quadricep muscle, anterior thigh region - quadriceps muscles, and posterior calf region - gastrocnemius muscle  Fluid Accumulation: does not meet criteria  Edema: no edema present   Reduced  Strength: unable to obtain  A minimum of two characteristics is recommended for diagnosis of either severe or " non-severe malnutrition.    Estimated Needs (using CBW 66.6 kg)  1551 kcal/day  [place] StIlan Martinez x 1.1 activity factor 1.3 stress factor  87 g protein/day 1.3 g/kg CBW  1551 mL fluid/day 1 mL/kcal    Nutrition Diagnosis    PES: Malnutrition related to acute illness as evidenced by <75% intake est needs >7 days, 5% wt loss 1 month, mild muscle/fat wasting. (new)     Nutrition Risk  moderate    Nutrition Intervention    Interventions(treatment strategy):  modified composition of meals/snacks, commercial beverage, prescription medication, and collaboration with other providers    Education Provided: high calorie/high protein diet  Teaching Method: explanation and printed materials  Comprehension: verbalizes understanding  Barriers to Learning: none evident  Expected Compliance: good  Comments: All questions were answered and dietitian's contact information was provided.      Nutrition Monitoring and Evaluation    Monitor: food and beverage intake, weight change, and electrolyte/renal panel    Follow up at next [facility] provider visit.        Trinity Rubin, MS, RD, , LDN

## 2023-12-05 ENCOUNTER — INFUSION (OUTPATIENT)
Dept: INFUSION THERAPY | Facility: HOSPITAL | Age: 83
End: 2023-12-05
Attending: INTERNAL MEDICINE
Payer: COMMERCIAL

## 2023-12-05 ENCOUNTER — PATIENT MESSAGE (OUTPATIENT)
Dept: HEMATOLOGY/ONCOLOGY | Facility: CLINIC | Age: 83
End: 2023-12-05
Payer: COMMERCIAL

## 2023-12-05 VITALS
HEART RATE: 76 BPM | OXYGEN SATURATION: 97 % | RESPIRATION RATE: 18 BRPM | TEMPERATURE: 98 F | SYSTOLIC BLOOD PRESSURE: 147 MMHG | DIASTOLIC BLOOD PRESSURE: 66 MMHG

## 2023-12-05 DIAGNOSIS — C25.9 PANCREATIC ADENOCARCINOMA: Primary | ICD-10-CM

## 2023-12-05 PROCEDURE — 96366 THER/PROPH/DIAG IV INF ADDON: CPT

## 2023-12-05 PROCEDURE — 96416 CHEMO PROLONG INFUSE W/PUMP: CPT

## 2023-12-05 PROCEDURE — 63600175 PHARM REV CODE 636 W HCPCS: Performed by: INTERNAL MEDICINE

## 2023-12-05 PROCEDURE — 96413 CHEMO IV INFUSION 1 HR: CPT

## 2023-12-05 PROCEDURE — 96415 CHEMO IV INFUSION ADDL HR: CPT

## 2023-12-05 PROCEDURE — 96368 THER/DIAG CONCURRENT INF: CPT

## 2023-12-05 PROCEDURE — 63600175 PHARM REV CODE 636 W HCPCS: Performed by: NURSE PRACTITIONER

## 2023-12-05 PROCEDURE — 25000003 PHARM REV CODE 250: Performed by: NURSE PRACTITIONER

## 2023-12-05 PROCEDURE — 96375 TX/PRO/DX INJ NEW DRUG ADDON: CPT

## 2023-12-05 RX ORDER — SODIUM CHLORIDE 0.9 % (FLUSH) 0.9 %
10 SYRINGE (ML) INJECTION
Status: DISCONTINUED | OUTPATIENT
Start: 2023-12-05 | End: 2023-12-05 | Stop reason: HOSPADM

## 2023-12-05 RX ORDER — POTASSIUM CHLORIDE 14.9 MG/ML
20 INJECTION INTRAVENOUS
Status: CANCELLED
Start: 2023-12-05

## 2023-12-05 RX ORDER — PROCHLORPERAZINE EDISYLATE 5 MG/ML
5 INJECTION INTRAMUSCULAR; INTRAVENOUS ONCE AS NEEDED
Status: DISCONTINUED | OUTPATIENT
Start: 2023-12-05 | End: 2023-12-05 | Stop reason: HOSPADM

## 2023-12-05 RX ORDER — POTASSIUM CHLORIDE 14.9 MG/ML
40 INJECTION INTRAVENOUS ONCE
Status: COMPLETED | OUTPATIENT
Start: 2023-12-05 | End: 2023-12-05

## 2023-12-05 RX ORDER — FAMOTIDINE 10 MG/ML
20 INJECTION INTRAVENOUS
Status: COMPLETED | OUTPATIENT
Start: 2023-12-05 | End: 2023-12-05

## 2023-12-05 RX ORDER — ATROPINE SULFATE 0.4 MG/ML
0.4 INJECTION, SOLUTION ENDOTRACHEAL; INTRAMEDULLARY; INTRAMUSCULAR; INTRAVENOUS; SUBCUTANEOUS ONCE AS NEEDED
Status: COMPLETED | OUTPATIENT
Start: 2023-12-05 | End: 2023-12-05

## 2023-12-05 RX ORDER — HEPARIN 100 UNIT/ML
500 SYRINGE INTRAVENOUS
Status: DISCONTINUED | OUTPATIENT
Start: 2023-12-05 | End: 2023-12-05 | Stop reason: HOSPADM

## 2023-12-05 RX ORDER — DIPHENHYDRAMINE HYDROCHLORIDE 50 MG/ML
25 INJECTION INTRAMUSCULAR; INTRAVENOUS
Status: COMPLETED | OUTPATIENT
Start: 2023-12-05 | End: 2023-12-05

## 2023-12-05 RX ORDER — EPINEPHRINE 0.3 MG/.3ML
0.3 INJECTION SUBCUTANEOUS ONCE AS NEEDED
Status: DISCONTINUED | OUTPATIENT
Start: 2023-12-05 | End: 2023-12-05 | Stop reason: HOSPADM

## 2023-12-05 RX ORDER — DIPHENHYDRAMINE HYDROCHLORIDE 50 MG/ML
50 INJECTION INTRAMUSCULAR; INTRAVENOUS ONCE AS NEEDED
Status: DISCONTINUED | OUTPATIENT
Start: 2023-12-05 | End: 2023-12-05 | Stop reason: HOSPADM

## 2023-12-05 RX ADMIN — DIPHENHYDRAMINE HYDROCHLORIDE 25 MG: 50 INJECTION INTRAMUSCULAR; INTRAVENOUS at 09:12

## 2023-12-05 RX ADMIN — PALONOSETRON 0.25 MG: 0.25 INJECTION, SOLUTION INTRAVENOUS at 09:12

## 2023-12-05 RX ADMIN — FLUOROURACIL 4250 MG: 50 INJECTION, SOLUTION INTRAVENOUS at 04:12

## 2023-12-05 RX ADMIN — FAMOTIDINE 20 MG: 10 INJECTION, SOLUTION INTRAVENOUS at 09:12

## 2023-12-05 RX ADMIN — DEXTROSE 700 MG: 5 SOLUTION INTRAVENOUS at 12:12

## 2023-12-05 RX ADMIN — ATROPINE SULFATE 0.4 MG: 0.4 INJECTION, SOLUTION INTRAVENOUS at 12:12

## 2023-12-05 RX ADMIN — OXALIPLATIN 150 MG: 5 INJECTION, SOLUTION INTRAVENOUS at 09:12

## 2023-12-05 RX ADMIN — DEXTROSE MONOHYDRATE: 50 INJECTION, SOLUTION INTRAVENOUS at 09:12

## 2023-12-05 RX ADMIN — POTASSIUM CHLORIDE 40 MEQ: 200 INJECTION, SOLUTION INTRAVENOUS at 12:12

## 2023-12-05 RX ADMIN — SODIUM CHLORIDE 260 MG: 9 INJECTION, SOLUTION INTRAVENOUS at 12:12

## 2023-12-05 NOTE — PLAN OF CARE
Problem: Adult Inpatient Plan of Care  Goal: Plan of Care Review  Outcome: Met  Flowsheets (Taken 12/5/2023 164)  Plan of Care Reviewed With: patient     Problem: Nausea and Vomiting (Chemotherapy Effects)  Goal: Fluid and Electrolyte Balance  Outcome: Met  Intervention: Prevent and Manage Nausea and Vomiting  Flowsheets (Taken 12/5/2023 6826)  Nausea/Vomiting Interventions: nausea triggers minimized  Environmental Support: calm environment promoted  Oral Care: patient refused intervention     Pt tolerated infusion well. Next appt reviewed; pt denied questions or further needs at the time of discharge.

## 2023-12-07 ENCOUNTER — INFUSION (OUTPATIENT)
Dept: INFUSION THERAPY | Facility: HOSPITAL | Age: 83
End: 2023-12-07
Attending: INTERNAL MEDICINE
Payer: COMMERCIAL

## 2023-12-07 VITALS
RESPIRATION RATE: 18 BRPM | DIASTOLIC BLOOD PRESSURE: 76 MMHG | TEMPERATURE: 98 F | HEART RATE: 84 BPM | SYSTOLIC BLOOD PRESSURE: 128 MMHG | OXYGEN SATURATION: 97 %

## 2023-12-07 DIAGNOSIS — C25.9 PANCREATIC ADENOCARCINOMA: Primary | ICD-10-CM

## 2023-12-07 PROCEDURE — A4216 STERILE WATER/SALINE, 10 ML: HCPCS | Performed by: NURSE PRACTITIONER

## 2023-12-07 PROCEDURE — 96361 HYDRATE IV INFUSION ADD-ON: CPT

## 2023-12-07 PROCEDURE — 25000003 PHARM REV CODE 250: Performed by: NURSE PRACTITIONER

## 2023-12-07 PROCEDURE — 96372 THER/PROPH/DIAG INJ SC/IM: CPT

## 2023-12-07 PROCEDURE — 63600175 PHARM REV CODE 636 W HCPCS: Mod: JZ,JG | Performed by: NURSE PRACTITIONER

## 2023-12-07 PROCEDURE — 96360 HYDRATION IV INFUSION INIT: CPT

## 2023-12-07 RX ORDER — SODIUM CHLORIDE 0.9 % (FLUSH) 0.9 %
10 SYRINGE (ML) INJECTION
Status: DISCONTINUED | OUTPATIENT
Start: 2023-12-07 | End: 2023-12-07 | Stop reason: HOSPADM

## 2023-12-07 RX ORDER — HEPARIN 100 UNIT/ML
500 SYRINGE INTRAVENOUS
Status: DISCONTINUED | OUTPATIENT
Start: 2023-12-07 | End: 2023-12-07 | Stop reason: HOSPADM

## 2023-12-07 RX ADMIN — Medication 10 ML: at 01:12

## 2023-12-07 RX ADMIN — PEGFILGRASTIM-CBQV 6 MG: 6 INJECTION, SOLUTION SUBCUTANEOUS at 01:12

## 2023-12-07 RX ADMIN — SODIUM CHLORIDE 1000 ML: 9 INJECTION, SOLUTION INTRAVENOUS at 11:12

## 2023-12-07 RX ADMIN — HEPARIN 500 UNITS: 100 SYRINGE at 01:12

## 2023-12-07 NOTE — PLAN OF CARE
C5 D3 DC pump/1L NS/Udenyca given. Tolerated well. Next appts given to pt. Dc'd home in stable condition.

## 2023-12-15 NOTE — PROGRESS NOTES
Subjective:       Patient ID: Darby Gary is a 83 y.o. female.    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  This was staged using CT scan.    Current Treatment:   Modified FOLFIRINOX without the 5FU bolus.  This treatment was started on 10/10/2023    Treatment History:  ERCP on 09/13/2023    HPI:  83-year-old female was seen by her nephrologist, blood work revealed elevated liver enzymes and bilirubin, she was instructed to present to the emergency department on 09/11/2023.  She presented to [facility] on 09/11/2023.  CMP in the emergency department revealed an ALT of 233, AST of 263, alkaline phosphatase of 1113, and a bilirubin of 2.3.  Hepatitis panel was done and was negative, lipase was also within normal limits.  A CEA was normal at 1.96, CA 19 9 was extremely elevated at 2461.89.  The patient underwent a CT scan of the abdomen and pelvis with contrast on 09/11/2023 that revealed suspected obstructive pancreatic head mass measuring 2.2 cm with moderate dilation of the biliary ductal system and severe dilation of the pancreatic duct.  There was 1-2 possible regional enlarged lymph nodes in the periportal region.  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  A biliary sphincterotomy was performed and 1 uncovered metal biliary stent was placed.  Brushings were obtained, cytology revealed adenocarcinoma.  She was discharged home in stable condition on 09/14/2023.  The patient was referred to Medical Oncology.  I saw the patient on 09/25/2023.  She stated that she was feeling okay.  She had no major issues to discuss.  She was seen by Dr. Chucky Olguin on 10/03/2023, underwent diagnostic laparoscopy and port placement on  10/10/2023.  No metastatic disease seen on diagnostic laparoscopy.  Started modified FOLFIRINOX on 10/10/2023.    Interval History:   Patient is here today for a treatment visit.  She started modified FOLFIRINOX on 10/10/2023.  She feels well overall.  She does have some fatigue.  She states that she tolerates treatment very well.  She stated that if we did not tell her that she had cancer, she would not even know.      Past Medical History:   Diagnosis Date    Arthritis     Diabetes mellitus     Hyperlipidemia     Hypertension     Osteoporosis     Pancreatic cancer       Past Surgical History:   Procedure Laterality Date    COLONOSCOPY      DIAGNOSTIC LAPAROSCOPY N/A 10/9/2023    Procedure: LAPAROSCOPY, DIAGNOSTIC  Req 12/12:30;  Surgeon: Chucky Olguin MD;  Location: [facility] OR;  Service: General;  Laterality: N/A;    ERCP N/A 9/13/2023    Procedure: ERCP (ENDOSCOPIC RETROGRADE CHOLANGIOPANCREATOGRAPHY)/ cytology brushing;  Surgeon: Erika Ulloa MD;  Location: [facility] ENDOSCOPY;  Service: Gastroenterology;  Laterality: N/A;    ERCP W/ SPHICTEROTOMY  9/13/2023    Procedure: ERCP, WITH SPHINCTEROTOMY;  Surgeon: Erika Ulloa MD;  Location: [facility] ENDOSCOPY;  Service: Gastroenterology;;    ERCP, WITH STENT INSERTION  9/13/2023    Procedure: ERCP, WITH STENT INSERTION;  Surgeon: Erika Ulloa MD;  Location: [facility] ENDOSCOPY;  Service: Gastroenterology;;    ESOPHAGOGASTRODUODENOSCOPY N/A 9/13/2023    Procedure: EGD (ESOPHAGOGASTRODUODENOSCOPY);  Surgeon: Erika Ulloa MD;  Location: [facility] ENDOSCOPY;  Service: Gastroenterology;  Laterality: N/A;    PLACEMENT, MEDIPORT Right 10/9/2023    Procedure: PLACEMENT, MEDIPORT;  Surgeon: Chucky Olguin MD;  Location: [facility] OR;  Service: General;  Laterality: Right;     Social History     Socioeconomic History    Marital status:    Tobacco Use    Smoking status: Former     Types: Cigarettes    Smokeless tobacco: Never    Tobacco comments:     Quit  over 25 years ago.    Substance and Sexual Activity    Alcohol use: Never    Drug use: Never    Sexual activity: Not Currently     Social Determinants of Health     Financial Resource Strain: High Risk (9/13/2023)    Overall Financial Resource Strain (CARDIA)     Difficulty of Paying Living Expenses: Hard   Food Insecurity: Food Insecurity Present (9/13/2023)    Hunger Vital Sign     Worried About Running Out of Food in the Last Year: Sometimes true     Ran Out of Food in the Last Year: Sometimes true   Transportation Needs: No Transportation Needs (9/13/2023)    PRAPARE - Transportation     Lack of Transportation (Medical): No     Lack of Transportation (Non-Medical): No   Physical Activity: Inactive (9/13/2023)    Exercise Vital Sign     Days of Exercise per Week: 0 days     Minutes of Exercise per Session: 0 min   Stress: No Stress Concern Present (9/13/2023)    [employer] - Occupational Stress Questionnaire     Feeling of Stress : Only a little   Social Connections: Moderately Isolated (9/13/2023)    Social Connection and Isolation Panel [NHANES]     Frequency of Communication with Friends and Family: More than three times a week     Frequency of Social Gatherings with Friends and Family: More than three times a week     Attends Anabaptism Services: More than 4 times per year     Active Member of Clubs or Organizations: No     Attends Club or Organization Meetings: Never     Marital Status:    Housing Stability: Low Risk  (9/13/2023)    Housing Stability Vital Sign     Unable to Pay for Housing in the Last Year: No     Number of Places Lived in the Last Year: 1     Unstable Housing in the Last Year: No      Family History   Problem Relation Age of Onset    Leukemia Father     Prostate cancer Brother     Breast cancer Other 48    Breast cancer Maternal Aunt     Breast cancer Maternal Aunt     Breast cancer Maternal Aunt     Cancer Paternal Uncle       Review of patient's  allergies indicates:  No Known Allergies   Review of Systems   Constitutional:  Negative for chills, diaphoresis, fatigue, fever and unexpected weight change.   HENT:  Negative for nasal congestion, mouth sores, sinus pressure/congestion and sore throat.    Eyes:  Negative for pain and visual disturbance.   Respiratory:  Negative for cough, chest tightness and shortness of breath.    Cardiovascular:  Negative for chest pain, palpitations and leg swelling.   Gastrointestinal:  Negative for abdominal distention, abdominal pain, blood in stool, constipation and diarrhea.   Genitourinary:  Negative for dysuria, frequency and hematuria.   Musculoskeletal:  Negative for arthralgias and back pain.   Integumentary:  Negative for rash.   Neurological:  Negative for dizziness, weakness, numbness and headaches.   Hematological:  Negative for adenopathy.   Psychiatric/Behavioral:  Negative for confusion.          Objective:      Physical Exam  Vitals reviewed.   Constitutional:       General: She is not in acute distress.     Appearance: Normal appearance.   HENT:      Head: Normocephalic and atraumatic.      Nose: Nose normal.      Mouth/Throat:      Mouth: Mucous membranes are moist.   Eyes:      Extraocular Movements: Extraocular movements intact.      Conjunctiva/sclera: Conjunctivae normal.   Cardiovascular:      Rate and Rhythm: Normal rate and regular rhythm.      Pulses: Normal pulses.      Heart sounds: Normal heart sounds.   Pulmonary:      Effort: Pulmonary effort is normal.      Breath sounds: Normal breath sounds.   Abdominal:      General: Bowel sounds are normal.      Palpations: Abdomen is soft.   Musculoskeletal:         General: No swelling. Normal range of motion.      Cervical back: Normal range of motion and neck supple.      Right lower leg: No edema.      Left lower leg: No edema.   Skin:     General: Skin is warm and dry.   Neurological:      General: No focal deficit present.      Mental Status: She is  alert and oriented to person, place, and time. Mental status is at baseline.   Psychiatric:         Mood and Affect: Mood normal.         Behavior: Behavior normal.         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  This was staged using CT scan.        Plan:       Patient has at least stage IIB pancreatic cancer based on CT abdomen/pelvis measurements.     PET/CT scan on 10/05/2023- No FDG avid metastatic disease identified. Focal uptake in the pancreatic head about a CBD stent compatible with patient's primary malignancy.    Patient saw Dr. Chucky Olguin on 10/03/2023, underwent port placement and diagnostic laparoscopy on 10/09/2023.  No metastatic disease seen.  Started modified FOLFIRINOX on 10/10/2023.    She has met with Genetics and results were negative.    Ca 19-9 is trending down and doing so consistently.    Will have her try Creon.     Proceed with Cycle 6 this week. Labs and treat in 2 weeks    RTC prior to Cycle 8 with labs: CBC, CMP, Mg, Ca 19-9    Javed Forman II, MD

## 2023-12-18 ENCOUNTER — CLINICAL SUPPORT (OUTPATIENT)
Dept: HEMATOLOGY/ONCOLOGY | Facility: CLINIC | Age: 83
End: 2023-12-18
Payer: COMMERCIAL

## 2023-12-18 ENCOUNTER — OFFICE VISIT (OUTPATIENT)
Dept: HEMATOLOGY/ONCOLOGY | Facility: CLINIC | Age: 83
End: 2023-12-18
Payer: COMMERCIAL

## 2023-12-18 VITALS
WEIGHT: 139.69 LBS | DIASTOLIC BLOOD PRESSURE: 84 MMHG | HEIGHT: 62 IN | OXYGEN SATURATION: 99 % | HEART RATE: 72 BPM | RESPIRATION RATE: 18 BRPM | BODY MASS INDEX: 25.7 KG/M2 | SYSTOLIC BLOOD PRESSURE: 153 MMHG

## 2023-12-18 DIAGNOSIS — C25.9 PANCREATIC ADENOCARCINOMA: Primary | ICD-10-CM

## 2023-12-18 DIAGNOSIS — R63.0 LOSS OF APPETITE: ICD-10-CM

## 2023-12-18 DIAGNOSIS — R11.0 NAUSEA: ICD-10-CM

## 2023-12-18 DIAGNOSIS — C77.2 MALIGNANT NEOPLASM METASTATIC TO INTRA-ABDOMINAL LYMPH NODE: ICD-10-CM

## 2023-12-18 PROCEDURE — 3077F SYST BP >= 140 MM HG: CPT | Mod: CPTII,S$GLB,, | Performed by: INTERNAL MEDICINE

## 2023-12-18 PROCEDURE — 1159F PR MEDICATION LIST DOCUMENTED IN MEDICAL RECORD: ICD-10-PCS | Mod: CPTII,S$GLB,, | Performed by: INTERNAL MEDICINE

## 2023-12-18 PROCEDURE — 3077F PR MOST RECENT SYSTOLIC BLOOD PRESSURE >= 140 MM HG: ICD-10-PCS | Mod: CPTII,S$GLB,, | Performed by: INTERNAL MEDICINE

## 2023-12-18 PROCEDURE — 1159F MED LIST DOCD IN RCRD: CPT | Mod: CPTII,S$GLB,, | Performed by: INTERNAL MEDICINE

## 2023-12-18 PROCEDURE — 1160F PR REVIEW ALL MEDS BY PRESCRIBER/CLIN PHARMACIST DOCUMENTED: ICD-10-PCS | Mod: CPTII,S$GLB,, | Performed by: INTERNAL MEDICINE

## 2023-12-18 PROCEDURE — 99215 OFFICE O/P EST HI 40 MIN: CPT | Mod: S$GLB,,, | Performed by: INTERNAL MEDICINE

## 2023-12-18 PROCEDURE — 1101F PT FALLS ASSESS-DOCD LE1/YR: CPT | Mod: CPTII,S$GLB,, | Performed by: INTERNAL MEDICINE

## 2023-12-18 PROCEDURE — 1101F PR PT FALLS ASSESS DOC 0-1 FALLS W/OUT INJ PAST YR: ICD-10-PCS | Mod: CPTII,S$GLB,, | Performed by: INTERNAL MEDICINE

## 2023-12-18 PROCEDURE — 99999 PR PBB SHADOW E&M-EST. PATIENT-LVL IV: CPT | Mod: PBBFAC,,, | Performed by: INTERNAL MEDICINE

## 2023-12-18 PROCEDURE — 3079F PR MOST RECENT DIASTOLIC BLOOD PRESSURE 80-89 MM HG: ICD-10-PCS | Mod: CPTII,S$GLB,, | Performed by: INTERNAL MEDICINE

## 2023-12-18 PROCEDURE — 99999 PR PBB SHADOW E&M-EST. PATIENT-LVL IV: ICD-10-PCS | Mod: PBBFAC,,, | Performed by: INTERNAL MEDICINE

## 2023-12-18 PROCEDURE — 3079F DIAST BP 80-89 MM HG: CPT | Mod: CPTII,S$GLB,, | Performed by: INTERNAL MEDICINE

## 2023-12-18 PROCEDURE — 1160F RVW MEDS BY RX/DR IN RCRD: CPT | Mod: CPTII,S$GLB,, | Performed by: INTERNAL MEDICINE

## 2023-12-18 PROCEDURE — 3288F PR FALLS RISK ASSESSMENT DOCUMENTED: ICD-10-PCS | Mod: CPTII,S$GLB,, | Performed by: INTERNAL MEDICINE

## 2023-12-18 PROCEDURE — 3288F FALL RISK ASSESSMENT DOCD: CPT | Mod: CPTII,S$GLB,, | Performed by: INTERNAL MEDICINE

## 2023-12-18 PROCEDURE — 99215 PR OFFICE/OUTPT VISIT, EST, LEVL V, 40-54 MIN: ICD-10-PCS | Mod: S$GLB,,, | Performed by: INTERNAL MEDICINE

## 2023-12-18 RX ORDER — DIPHENHYDRAMINE HYDROCHLORIDE 50 MG/ML
50 INJECTION INTRAMUSCULAR; INTRAVENOUS ONCE AS NEEDED
Status: CANCELLED | OUTPATIENT
Start: 2023-12-19

## 2023-12-18 RX ORDER — ATROPINE SULFATE 0.4 MG/ML
0.4 INJECTION, SOLUTION ENDOTRACHEAL; INTRAMEDULLARY; INTRAMUSCULAR; INTRAVENOUS; SUBCUTANEOUS ONCE AS NEEDED
Status: CANCELLED | OUTPATIENT
Start: 2023-12-19

## 2023-12-18 RX ORDER — HEPARIN 100 UNIT/ML
500 SYRINGE INTRAVENOUS
Status: CANCELLED | OUTPATIENT
Start: 2023-12-19

## 2023-12-18 RX ORDER — FAMOTIDINE 10 MG/ML
20 INJECTION INTRAVENOUS
Status: CANCELLED
Start: 2023-12-19

## 2023-12-18 RX ORDER — PROCHLORPERAZINE EDISYLATE 5 MG/ML
5 INJECTION INTRAMUSCULAR; INTRAVENOUS ONCE AS NEEDED
Status: CANCELLED | OUTPATIENT
Start: 2023-12-19

## 2023-12-18 RX ORDER — FLUOROURACIL 50 MG/ML
2400 INJECTION, SOLUTION INTRAVENOUS
Status: CANCELLED | OUTPATIENT
Start: 2023-12-19

## 2023-12-18 RX ORDER — SODIUM CHLORIDE 0.9 % (FLUSH) 0.9 %
10 SYRINGE (ML) INJECTION
Status: CANCELLED | OUTPATIENT
Start: 2023-12-21

## 2023-12-18 RX ORDER — PANCRELIPASE 36000; 180000; 114000 [USP'U]/1; [USP'U]/1; [USP'U]/1
1 CAPSULE, DELAYED RELEASE PELLETS ORAL
Qty: 90 CAPSULE | Refills: 0 | Status: SHIPPED | OUTPATIENT
Start: 2023-12-18 | End: 2024-01-17

## 2023-12-18 RX ORDER — EPINEPHRINE 0.3 MG/.3ML
0.3 INJECTION SUBCUTANEOUS ONCE AS NEEDED
Status: CANCELLED | OUTPATIENT
Start: 2023-12-19

## 2023-12-18 RX ORDER — SODIUM CHLORIDE 0.9 % (FLUSH) 0.9 %
10 SYRINGE (ML) INJECTION
Status: CANCELLED | OUTPATIENT
Start: 2023-12-19

## 2023-12-18 RX ORDER — DIPHENHYDRAMINE HYDROCHLORIDE 50 MG/ML
25 INJECTION INTRAMUSCULAR; INTRAVENOUS
Status: CANCELLED
Start: 2023-12-19

## 2023-12-18 RX ORDER — HEPARIN 100 UNIT/ML
500 SYRINGE INTRAVENOUS
Status: CANCELLED | OUTPATIENT
Start: 2023-12-21

## 2023-12-18 NOTE — PROGRESS NOTES
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 High kcal/high protein diet as tolerated; avoid ingestion of cold foods/beverages during oxaliplatin infusion and for 4-5 days following  2. Creon per MD. Recommend 36,000 units 1 cap with meals 1 cap with snacks  3. Oral supplement of choice at least once daily  4. RD to continue monitoring    Nutrition Assessment    10/3/23: This is a 83 y.o.female with a medical diagnosis of pancreatic CA, s/p ERCP a few weeks ago. She reports ~30# wt loss over the past 6 months or so. Prior to ERCP she was having frequent/loose bowel movements, especially after eating. Stool was normal in color, however. She states no c/o n/v/c, no indigestion or bloating now or prior to ERCP. She states her appetite has always been good. She is tolerating a regular diet at this time. She notes that wt loss seems to have slowed in the past couple weeks    11/9/23: Pt here for infusion. She reports relatively good appetite and po intake. She is trying to eat healthy; some of her overall wt loss has been intentional. No c/o n/v/c/d at this time. Her wt has been stable for ~6 weeks.     12/4/23: Pt here for NP f/u. She notes some days with good appetite and others it is decreased. She has noticed dysgeusia following each cycle of treatment that improves over time. Also noting some "gurgling," possible indigestion/maldigestion? NP discussed pancreatic enzyme trial and pt is amenable. I did provide high kcal/high protein snack list for pt for days when her appetite is lower. She did have wt loss earlier in the year that had stabilized in recent months but she has begun to lose wt " "again. Total wt loss since April is ~45# (~14# in the past 6-8 weeks).     12/18/23: pt here for MD lema. She reports good appetite and po intake. States "I am eating everything I can find." She did try the Creon and states she thinks it helped her digestion. However, she had not called to have it filled so she never got a prescription. Did note that she lost a further 7# in the past couple weeks. Total loss since April is 52#.    Nutrition Factors Affecting Intake  none identified    PMHx: HLD, HTN, osteoporosis     Allergies: Patient has no known allergies.    Current Medications:    Current Outpatient Medications:     aspirin (ECOTRIN) 81 MG EC tablet, Take 1 tablet (81 mg total) by mouth once daily., Disp: 30 tablet, Rfl: 11    cyclobenzaprine HCl (CYCLOBENZAPRINE ORAL), Take 1 tablet by mouth 3 times daily as needed., Disp: , Rfl:     dexAMETHasone (DECADRON) 4 MG Tab, Take 2 tablets by mouth daily on days 2-3 of each chemotherapy cycle., Disp: 6 tablet, Rfl: 5    ergocalciferol (ERGOCALCIFEROL) 50,000 unit Cap, Take 50,000 Units by mouth every 7 days., Disp: , Rfl:     gabapentin (NEURONTIN) 300 MG capsule, Take 300-600 mg by mouth every evening., Disp: , Rfl:     lisinopriL-hydrochlorothiazide (PRINZIDE,ZESTORETIC) 10-12.5 mg per tablet, , Disp: , Rfl:     loperamide (IMODIUM) 2 mg capsule, Take 2 capsules by mouth at the onset of diarrhea then 1 capsule every 2 hours until diarrhea free for 12 hours. (Patient not taking: Reported on 12/18/2023), Disp: 30 capsule, Rfl: 11    metFORMIN (GLUCOPHAGE) 500 MG tablet, Take 500 mg by mouth once daily., Disp: , Rfl:     mirtazapine (REMERON) 7.5 MG Tab, Take 1 tablet (7.5 mg total) by mouth every evening. (Patient not taking: Reported on 12/18/2023), Disp: 30 tablet, Rfl: 3    mupirocin (BACTROBAN) 2 % ointment, mupirocin 2 % topical ointment, Disp: , Rfl:     ondansetron (ZOFRAN-ODT) 8 MG TbDL, Take 1 tablet (8 mg total) by mouth every 6 (six) hours as needed " "(Nausea)., Disp: 60 tablet, Rfl: 1    potassium chloride (KLOR-CON) 10 MEQ TbSR, Take 10 mEq by mouth once daily., Disp: , Rfl:     prochlorperazine (COMPAZINE) 5 MG tablet, Take 1 tablet (5 mg total) by mouth every 6 (six) hours as needed for Nausea., Disp: 20 tablet, Rfl: 5    PROCTO-MED HC 2.5 % rectal cream, SMARTSIG:Topical, Disp: , Rfl:     simvastatin (ZOCOR) 40 MG tablet, SMARTSI Tablet(s) By Mouth Every Evening, Disp: , Rfl:     simvastatin (ZOCOR) 40 MG tablet, Take 40 mg by mouth once daily., Disp: , Rfl:     triamcinolone acetonide 0.025% (KENALOG) 0.025 % cream, triamcinolone acetonide 0.025 % topical cream  APPLY TO THE AFFECTED AREA(S) ONCE a DAY, Disp: , Rfl:     valACYclovir (VALTREX) 1000 MG tablet, Take 1 tablet (1,000 mg total) by mouth 2 (two) times daily., Disp: 14 tablet, Rfl: 0    Labs: 23 WNL    Anthropometrics    Height:   Ht Readings from Last 1 Encounters:   23 5' 2" (1.575 m)      Weight:   Wt Readings from Last 5 Encounters:   23 63.3 kg (139 lb 10.6 oz)   23 66.6 kg (146 lb 13.2 oz)   23 67.4 kg (148 lb 9.6 oz)   23 67.4 kg (148 lb 9.6 oz)   23 69.5 kg (153 lb 3.5 oz)        Usual Body Weight: 87.4 kg (192 lb)    % Weight Change: -27.6% in 8 months to lowest wt 146 lb    BMI: 25.5 (overweight)    Ideal Weight: 50 kg (110 lb)  % Ideal Weight: 126%    Malnutrition in the context of acute illness or injury  Degree of Malnutrition: non-severe (moderate) malnutrition  Energy Intake: < 75% of estimated energy requirement for > 7 days  Interpretation of Weight Loss: >5% in 1 month  Body Fat:mild depletion  Area of Body Fat Loss: orbital region , upper arm region - triceps / biceps, and thoracic and lumbar region - ribs, lower back, midaxillary line  Muscle Mass Loss: mild depletion  Area of Muscle Mass Loss: temple region - temporalis muscle, clavicle bone region - pectoralis major, deltoid, trapezius muscles, clavicle and acromion bone region - " deltoid muscle, scapular bone region - trapezius, supraspinus, infraspinus muscles, dorsal hand - interosseous musle, patellar region - quadricep muscle, anterior thigh region - quadriceps muscles, posterior calf region - gastrocnemius muscle, and does not meet criteria  Fluid Accumulation: does not meet criteria  Edema: no edema present   Reduced  Strength: unable to obtain  A minimum of two characteristics is recommended for diagnosis of either severe or non-severe malnutrition.    Estimated Needs (using CBW 63.3 kg)  1735 kcal/day  [place] ST. Jeor x 1.1 activity factor x 1.5 stress factor  95 g protein/day 1.5 g/kg cBW  1735 mL fluid/day 1 mL/kcal    Nutrition Diagnosis    PES: Malnutrition related to acute illness as evidenced by <75% intake est needs >7 days, >5% wt loss 1 month, mild muscle/fat wasting. (active)     Nutrition Risk  moderate     Nutrition Intervention    Interventions(treatment strategy):  modified composition of meals/snacks, commercial beverage, prescription medication, and collaboration with other providers      Nutrition Monitoring and Evaluation    Monitor: food and beverage intake, weight change, and electrolyte/renal panel    Follow up at next [facility] provider visit.        Trinity Rubin, MS, RD, , LDN

## 2023-12-19 ENCOUNTER — INFUSION (OUTPATIENT)
Dept: INFUSION THERAPY | Facility: HOSPITAL | Age: 83
End: 2023-12-19
Attending: INTERNAL MEDICINE
Payer: COMMERCIAL

## 2023-12-19 VITALS
WEIGHT: 139.69 LBS | SYSTOLIC BLOOD PRESSURE: 147 MMHG | HEIGHT: 62 IN | HEART RATE: 75 BPM | DIASTOLIC BLOOD PRESSURE: 89 MMHG | BODY MASS INDEX: 25.7 KG/M2 | RESPIRATION RATE: 18 BRPM | TEMPERATURE: 98 F | OXYGEN SATURATION: 99 %

## 2023-12-19 DIAGNOSIS — C25.9 PANCREATIC ADENOCARCINOMA: Primary | ICD-10-CM

## 2023-12-19 PROCEDURE — 96415 CHEMO IV INFUSION ADDL HR: CPT

## 2023-12-19 PROCEDURE — 96417 CHEMO IV INFUS EACH ADDL SEQ: CPT

## 2023-12-19 PROCEDURE — 96368 THER/DIAG CONCURRENT INF: CPT

## 2023-12-19 PROCEDURE — 25000003 PHARM REV CODE 250: Performed by: INTERNAL MEDICINE

## 2023-12-19 PROCEDURE — 96416 CHEMO PROLONG INFUSE W/PUMP: CPT

## 2023-12-19 PROCEDURE — 96413 CHEMO IV INFUSION 1 HR: CPT

## 2023-12-19 PROCEDURE — 96375 TX/PRO/DX INJ NEW DRUG ADDON: CPT

## 2023-12-19 PROCEDURE — 96367 TX/PROPH/DG ADDL SEQ IV INF: CPT

## 2023-12-19 PROCEDURE — 63600175 PHARM REV CODE 636 W HCPCS: Performed by: INTERNAL MEDICINE

## 2023-12-19 RX ORDER — EPINEPHRINE 0.3 MG/.3ML
0.3 INJECTION SUBCUTANEOUS ONCE AS NEEDED
Status: DISCONTINUED | OUTPATIENT
Start: 2023-12-19 | End: 2023-12-19 | Stop reason: HOSPADM

## 2023-12-19 RX ORDER — HEPARIN 100 UNIT/ML
500 SYRINGE INTRAVENOUS
Status: DISCONTINUED | OUTPATIENT
Start: 2023-12-19 | End: 2023-12-19 | Stop reason: HOSPADM

## 2023-12-19 RX ORDER — DIPHENHYDRAMINE HYDROCHLORIDE 50 MG/ML
25 INJECTION INTRAMUSCULAR; INTRAVENOUS
Status: COMPLETED | OUTPATIENT
Start: 2023-12-19 | End: 2023-12-19

## 2023-12-19 RX ORDER — ATROPINE SULFATE 0.4 MG/ML
0.4 INJECTION, SOLUTION ENDOTRACHEAL; INTRAMEDULLARY; INTRAMUSCULAR; INTRAVENOUS; SUBCUTANEOUS ONCE AS NEEDED
Status: COMPLETED | OUTPATIENT
Start: 2023-12-19 | End: 2023-12-19

## 2023-12-19 RX ORDER — DIPHENHYDRAMINE HYDROCHLORIDE 50 MG/ML
50 INJECTION INTRAMUSCULAR; INTRAVENOUS ONCE AS NEEDED
Status: DISCONTINUED | OUTPATIENT
Start: 2023-12-19 | End: 2023-12-19 | Stop reason: HOSPADM

## 2023-12-19 RX ORDER — FAMOTIDINE 10 MG/ML
20 INJECTION INTRAVENOUS
Status: COMPLETED | OUTPATIENT
Start: 2023-12-19 | End: 2023-12-19

## 2023-12-19 RX ORDER — PROCHLORPERAZINE EDISYLATE 5 MG/ML
5 INJECTION INTRAMUSCULAR; INTRAVENOUS ONCE AS NEEDED
Status: DISCONTINUED | OUTPATIENT
Start: 2023-12-19 | End: 2023-12-19 | Stop reason: HOSPADM

## 2023-12-19 RX ORDER — SODIUM CHLORIDE 0.9 % (FLUSH) 0.9 %
10 SYRINGE (ML) INJECTION
Status: DISCONTINUED | OUTPATIENT
Start: 2023-12-19 | End: 2023-12-19 | Stop reason: HOSPADM

## 2023-12-19 RX ADMIN — ATROPINE SULFATE 0.4 MG: 0.4 INJECTION, SOLUTION INTRAVENOUS at 12:12

## 2023-12-19 RX ADMIN — PALONOSETRON 0.25 MG: 0.25 INJECTION, SOLUTION INTRAVENOUS at 09:12

## 2023-12-19 RX ADMIN — LEUCOVORIN CALCIUM 650 MG: 200 INJECTION, POWDER, LYOPHILIZED, FOR SOLUTION INTRAMUSCULAR; INTRAVENOUS at 12:12

## 2023-12-19 RX ADMIN — DIPHENHYDRAMINE HYDROCHLORIDE 25 MG: 50 INJECTION INTRAMUSCULAR; INTRAVENOUS at 09:12

## 2023-12-19 RX ADMIN — FLUOROURACIL 4000 MG: 50 INJECTION, SOLUTION INTRAVENOUS at 02:12

## 2023-12-19 RX ADMIN — SODIUM CHLORIDE 250 MG: 9 INJECTION, SOLUTION INTRAVENOUS at 12:12

## 2023-12-19 RX ADMIN — SODIUM CHLORIDE: 9 INJECTION, SOLUTION INTRAVENOUS at 09:12

## 2023-12-19 RX ADMIN — FAMOTIDINE 20 MG: 10 INJECTION, SOLUTION INTRAVENOUS at 09:12

## 2023-12-19 RX ADMIN — OXALIPLATIN 142 MG: 5 INJECTION, SOLUTION INTRAVENOUS at 10:12

## 2023-12-19 RX ADMIN — DEXTROSE MONOHYDRATE: 50 INJECTION, SOLUTION INTRAVENOUS at 10:12

## 2023-12-21 ENCOUNTER — INFUSION (OUTPATIENT)
Dept: INFUSION THERAPY | Facility: HOSPITAL | Age: 83
End: 2023-12-21
Attending: INTERNAL MEDICINE
Payer: COMMERCIAL

## 2023-12-21 VITALS
HEIGHT: 62 IN | OXYGEN SATURATION: 98 % | HEART RATE: 80 BPM | BODY MASS INDEX: 25.7 KG/M2 | RESPIRATION RATE: 18 BRPM | TEMPERATURE: 98 F | WEIGHT: 139.69 LBS | SYSTOLIC BLOOD PRESSURE: 131 MMHG | DIASTOLIC BLOOD PRESSURE: 75 MMHG

## 2023-12-21 DIAGNOSIS — C25.9 PANCREATIC ADENOCARCINOMA: Primary | ICD-10-CM

## 2023-12-21 PROCEDURE — 63600175 PHARM REV CODE 636 W HCPCS: Performed by: INTERNAL MEDICINE

## 2023-12-21 PROCEDURE — 96361 HYDRATE IV INFUSION ADD-ON: CPT

## 2023-12-21 PROCEDURE — 25000003 PHARM REV CODE 250: Performed by: INTERNAL MEDICINE

## 2023-12-21 PROCEDURE — 96372 THER/PROPH/DIAG INJ SC/IM: CPT | Mod: 59

## 2023-12-21 RX ORDER — SODIUM CHLORIDE 0.9 % (FLUSH) 0.9 %
10 SYRINGE (ML) INJECTION
Status: DISCONTINUED | OUTPATIENT
Start: 2023-12-21 | End: 2023-12-21 | Stop reason: HOSPADM

## 2023-12-21 RX ORDER — HEPARIN 100 UNIT/ML
500 SYRINGE INTRAVENOUS
Status: DISCONTINUED | OUTPATIENT
Start: 2023-12-21 | End: 2023-12-21 | Stop reason: HOSPADM

## 2023-12-21 RX ADMIN — PEGFILGRASTIM-CBQV 6 MG: 6 INJECTION, SOLUTION SUBCUTANEOUS at 12:12

## 2023-12-21 RX ADMIN — HEPARIN 500 UNITS: 100 SYRINGE at 12:12

## 2023-12-21 RX ADMIN — SODIUM CHLORIDE 1000 ML: 9 INJECTION, SOLUTION INTRAVENOUS at 10:12

## 2023-12-27 ENCOUNTER — HOSPITAL ENCOUNTER (EMERGENCY)
Facility: HOSPITAL | Age: 83
Discharge: HOME | End: 2023-12-27
Attending: STUDENT IN AN ORGANIZED HEALTH CARE EDUCATION/TRAINING PROGRAM
Payer: COMMERCIAL

## 2023-12-27 VITALS
TEMPERATURE: 99 F | HEIGHT: 62 IN | DIASTOLIC BLOOD PRESSURE: 72 MMHG | BODY MASS INDEX: 27.23 KG/M2 | HEART RATE: 72 BPM | OXYGEN SATURATION: 96 % | RESPIRATION RATE: 18 BRPM | SYSTOLIC BLOOD PRESSURE: 137 MMHG | WEIGHT: 148 LBS
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
## 2023-12-27 DIAGNOSIS — R07.89 CHEST WALL PAIN: ICD-10-CM

## 2023-12-27 LAB
ABS NEUT (OLG): 13.11 X10(3)/MCL (ref 2.1–9.2)
ALBUMIN SERPL-MCNC: 3.5 G/DL (ref 3.4–4.8)
ALBUMIN/GLOB SERPL: 1.3 RATIO (ref 1.1–2)
ALP SERPL-CCNC: 149 UNIT/L (ref 40–150)
ALT SERPL-CCNC: 17 UNIT/L (ref 0–55)
APPEARANCE UR: CLEAR
AST SERPL-CCNC: 25 UNIT/L (ref 5–34)
BACTERIA #/AREA URNS AUTO: ABNORMAL /HPF
BILIRUB SERPL-MCNC: 0.4 MG/DL
BILIRUB UR QL STRIP.AUTO: NEGATIVE
BUN SERPL-MCNC: 9.2 MG/DL (ref 9.8–20.1)
CALCIUM SERPL-MCNC: 8.7 MG/DL (ref 8.4–10.2)
CHLORIDE SERPL-SCNC: 100 MMOL/L (ref 98–107)
CO2 SERPL-SCNC: 31 MMOL/L (ref 23–31)
COLOR UR AUTO: YELLOW
CREAT SERPL-MCNC: 0.67 MG/DL (ref 0.55–1.02)
EOSINOPHIL NFR BLD MANUAL: 0.37 X10(3)/MCL (ref 0–0.9)
EOSINOPHIL NFR BLD MANUAL: 2 %
ERYTHROCYTE [DISTWIDTH] IN BLOOD BY AUTOMATED COUNT: 15.5 % (ref 11.5–17)
GFR SERPLBLD CREATININE-BSD FMLA CKD-EPI: >60 MLS/MIN/1.73/M2
GLOBULIN SER-MCNC: 2.7 GM/DL (ref 2.4–3.5)
GLUCOSE SERPL-MCNC: 110 MG/DL (ref 82–115)
GLUCOSE UR QL STRIP.AUTO: NORMAL
HCT VFR BLD AUTO: 38.2 % (ref 37–47)
HGB BLD-MCNC: 12.3 G/DL (ref 12–16)
INSTRUMENT WBC (OLG): 18.46 X10(3)/MCL
KETONES UR QL STRIP.AUTO: NEGATIVE
LEUKOCYTE ESTERASE UR QL STRIP.AUTO: 75
LYMPHOCYTES NFR BLD MANUAL: 11 %
LYMPHOCYTES NFR BLD MANUAL: 2.03 X10(3)/MCL
MAGNESIUM SERPL-MCNC: 1.9 MG/DL (ref 1.6–2.6)
MCH RBC QN AUTO: 30.4 PG (ref 27–31)
MCHC RBC AUTO-ENTMCNC: 32.2 G/DL (ref 33–36)
MCV RBC AUTO: 94.6 FL (ref 80–94)
MONOCYTES NFR BLD MANUAL: 15 %
MONOCYTES NFR BLD MANUAL: 2.77 X10(3)/MCL (ref 0.1–1.3)
MUCOUS THREADS URNS QL MICRO: ABNORMAL /LPF
MYELOCYTES NFR BLD MANUAL: 2 %
NEUTROPHILS NFR BLD MANUAL: 71 %
NITRITE UR QL STRIP.AUTO: NEGATIVE
NRBC BLD AUTO-RTO: 0 %
PH UR STRIP.AUTO: 6 [PH]
PLATELET # BLD AUTO: 214 X10(3)/MCL (ref 130–400)
PLATELET # BLD EST: NORMAL 10*3/UL
PMV BLD AUTO: 10.1 FL (ref 7.4–10.4)
POTASSIUM SERPL-SCNC: 2.9 MMOL/L (ref 3.5–5.1)
PROT SERPL-MCNC: 6.2 GM/DL (ref 5.8–7.6)
PROT UR QL STRIP.AUTO: ABNORMAL
RBC # BLD AUTO: 4.04 X10(6)/MCL (ref 4.2–5.4)
RBC #/AREA URNS AUTO: ABNORMAL /HPF
RBC MORPH BLD: NORMAL
RBC UR QL AUTO: ABNORMAL
SODIUM SERPL-SCNC: 141 MMOL/L (ref 136–145)
SP GR UR STRIP.AUTO: 1.02 (ref 1–1.03)
SQUAMOUS #/AREA URNS LPF: ABNORMAL /HPF
TOXIC GRANULES BLD QL SMEAR: ABNORMAL
TROPONIN I SERPL-MCNC: <0.01 NG/ML (ref 0–0.04)
UROBILINOGEN UR STRIP-ACNC: NORMAL
WBC # SPEC AUTO: 18.46 X10(3)/MCL (ref 4.5–11.5)
WBC #/AREA URNS AUTO: ABNORMAL /HPF

## 2023-12-27 PROCEDURE — 93010 ELECTROCARDIOGRAM REPORT: CPT | Mod: ,,, | Performed by: INTERNAL MEDICINE

## 2023-12-27 PROCEDURE — 85027 COMPLETE CBC AUTOMATED: CPT

## 2023-12-27 PROCEDURE — 83735 ASSAY OF MAGNESIUM: CPT | Performed by: PHYSICIAN ASSISTANT

## 2023-12-27 PROCEDURE — 93010 EKG 12-LEAD: ICD-10-PCS | Mod: ,,, | Performed by: INTERNAL MEDICINE

## 2023-12-27 PROCEDURE — 84484 ASSAY OF TROPONIN QUANT: CPT

## 2023-12-27 PROCEDURE — 81001 URINALYSIS AUTO W/SCOPE: CPT

## 2023-12-27 PROCEDURE — 93005 ELECTROCARDIOGRAM TRACING: CPT

## 2023-12-27 PROCEDURE — 25000003 PHARM REV CODE 250: Performed by: PHYSICIAN ASSISTANT

## 2023-12-27 PROCEDURE — 99285 EMERGENCY DEPT VISIT HI MDM: CPT | Mod: 25

## 2023-12-27 PROCEDURE — 80053 COMPREHEN METABOLIC PANEL: CPT

## 2023-12-27 RX ADMIN — POTASSIUM BICARBONATE 40 MEQ: 391 TABLET, EFFERVESCENT ORAL at 01:12

## 2023-12-27 NOTE — ED PROVIDER NOTES
Encounter Date: 12/27/2023       History     Chief Complaint   Patient presents with    Fall     Pt. Reports trip and fall, c/o facial pain, and discomfort around mediport to R chest wall.      83-year-old female with a history of arthritis, diabetes, hyperlipidemia, hypertension, osteoporosis and pancreatic cancer presents to ED following trip and fall.  Patient states she was walking on parking lot when her foot caught something on the ground and she fell face 1st. Notes she had some pain across her chest wall after the fall and headache as well which has since subsided.  She notes that she was worried because of the MediPort she has in her chest.  Denies LOC, nausea, vomiting, blurred vision, neck pain, back pain, shortness breath.    The history is provided by the patient. No  was used.     Review of patient's allergies indicates:  No Known Allergies  Past Medical History:   Diagnosis Date    Arthritis     Diabetes mellitus     Hyperlipidemia     Hypertension     Osteoporosis     Pancreatic cancer      Past Surgical History:   Procedure Laterality Date    COLONOSCOPY      DIAGNOSTIC LAPAROSCOPY N/A 10/9/2023    Procedure: LAPAROSCOPY, DIAGNOSTIC  Req 12/12:30;  Surgeon: Chucky Olguin MD;  Location: [facility] OR;  Service: General;  Laterality: N/A;    ERCP N/A 9/13/2023    Procedure: ERCP (ENDOSCOPIC RETROGRADE CHOLANGIOPANCREATOGRAPHY)/ cytology brushing;  Surgeon: Erika Ulloa MD;  Location: [facility] ENDOSCOPY;  Service: Gastroenterology;  Laterality: N/A;    ERCP W/ SPHICTEROTOMY  9/13/2023    Procedure: ERCP, WITH SPHINCTEROTOMY;  Surgeon: Erika Ulloa MD;  Location: [facility] ENDOSCOPY;  Service: Gastroenterology;;    ERCP, WITH STENT INSERTION  9/13/2023    Procedure: ERCP, WITH STENT INSERTION;  Surgeon: Erika Ulloa MD;  Location: [facility] ENDOSCOPY;  Service: Gastroenterology;;    ESOPHAGOGASTRODUODENOSCOPY N/A 9/13/2023    Procedure: EGD (ESOPHAGOGASTRODUODENOSCOPY);   Surgeon: Erika Ulloa MD;  Location: [facility] ENDOSCOPY;  Service: Gastroenterology;  Laterality: N/A;    PLACEMENT, MEDIPORT Right 10/9/2023    Procedure: PLACEMENT, MEDIPORT;  Surgeon: Chucky Olguin MD;  Location: [facility] OR;  Service: General;  Laterality: Right;     Family History   Problem Relation Age of Onset    Leukemia Father     Prostate cancer Brother     Breast cancer Other 48    Breast cancer Maternal Aunt     Breast cancer Maternal Aunt     Breast cancer Maternal Aunt     Cancer Paternal Uncle      Social History     Tobacco Use    Smoking status: Former     Types: Cigarettes    Smokeless tobacco: Never    Tobacco comments:     Quit over 25 years ago.    Substance Use Topics    Alcohol use: Never    Drug use: Never     Review of Systems   Constitutional:  Negative for chills and fever.   Eyes:  Negative for visual disturbance.   Respiratory:  Negative for cough and shortness of breath.    Cardiovascular:  Positive for chest pain (chest wall pain).   Gastrointestinal:  Negative for abdominal pain, nausea and vomiting.   Genitourinary:  Negative for dysuria.   Musculoskeletal:  Negative for arthralgias, back pain and neck pain.   Skin:  Negative for color change and rash.   Neurological:  Positive for headaches. Negative for dizziness.   Psychiatric/Behavioral:  Negative for behavioral problems.    All other systems reviewed and are negative.      Physical Exam     Initial Vitals [12/27/23 0948]   BP Pulse Resp Temp SpO2   (!) 155/73 83 16 97.9 °F (36.6 °C) 97 %      MAP       --         Physical Exam    Nursing note and vitals reviewed.  Constitutional: She appears well-developed and well-nourished.   HENT:   Head: Normocephalic and atraumatic.   Nose: Nose normal. No sinus tenderness, nasal deformity, septal deviation or nasal septal hematoma. No epistaxis.   Mouth/Throat: Uvula is midline, oropharynx is clear and moist and mucous membranes are normal.   Eyes: EOM are normal. Pupils are equal,  round, and reactive to light.   Neck: Trachea normal and phonation normal. Neck supple.    Full passive range of motion without pain.     Cardiovascular:  Normal rate, regular rhythm and normal heart sounds.           Pulmonary/Chest: Effort normal and breath sounds normal. She exhibits no tenderness, no bony tenderness, no laceration, no crepitus, no edema, no deformity, no swelling and no retraction.     Abdominal: Abdomen is soft. Bowel sounds are normal.   Musculoskeletal:         General: Normal range of motion.      Cervical back: Normal, full passive range of motion without pain and neck supple. No rigidity. No spinous process tenderness or muscular tenderness. Normal range of motion.      Thoracic back: Normal.      Lumbar back: Normal.     Neurological: She is alert and oriented to person, place, and time. She has normal strength. GCS score is 15. GCS eye subscore is 4. GCS verbal subscore is 5. GCS motor subscore is 6.   Skin: Skin is warm and dry.   Psychiatric: She has a normal mood and affect.         ED Course   Procedures  Labs Reviewed   COMPREHENSIVE METABOLIC PANEL - Abnormal; Notable for the following components:       Result Value    Potassium Level 2.9 (*)     Blood Urea Nitrogen 9.2 (*)     All other components within normal limits   URINALYSIS, REFLEX TO URINE CULTURE - Abnormal; Notable for the following components:    Protein, UA Trace (*)     Blood, UA 1+ (*)     Leukocyte Esterase, UA 75 (*)     Mucous, UA Trace (*)     RBC, UA 6-10 (*)     All other components within normal limits   CBC WITH DIFFERENTIAL - Abnormal; Notable for the following components:    WBC 18.46 (*)     RBC 4.04 (*)     MCV 94.6 (*)     MCHC 32.2 (*)     All other components within normal limits   MANUAL DIFFERENTIAL - Abnormal; Notable for the following components:    Myelocytes % 2 (*)     Neutrophils Abs 13.1066 (*)     Monocytes Abs 2.769 (*)     Toxic Granulation 1+ (*)     All other components within normal  limits   TROPONIN I - Normal   MAGNESIUM - Normal   CBC W/ AUTO DIFFERENTIAL    Narrative:     The following orders were created for panel order CBC auto differential.  Procedure                               Abnormality         Status                     ---------                               -----------         ------                     CBC with Differential[[id number]]       Abnormal            Final result               Manual Differential[[id number]]         Abnormal            Final result                 Please view results for these tests on the individual orders.        ECG Results              EKG 12-lead (Final result)  Result time 12/27/23 11:36:23      Final result by Interface, Lab In [facility] (12/27/23 11:36:23)                   Narrative:    Test Reason : R07.89,    Vent. Rate : 075 BPM     Atrial Rate : 075 BPM     P-R Int : 150 ms          QRS Dur : 084 ms      QT Int : 384 ms       P-R-T Axes : 078 063 013 degrees     QTc Int : 428 ms    Normal sinus rhythm  Normal ECG  When compared with ECG of 03-OCT-2023 12:25,  No significant change was found  Confirmed by Derrick Kenyon MD ([id number]) on 12/27/2023 11:36:14 AM    Referred By: JAQUELINE   SELF           Confirmed By:Derrick Kenyon MD                                  Imaging Results              CT Head Without Contrast (Final result)  Result time 12/27/23 11:03:26      Final result by Boom Escobar MD (12/27/23 11:03:26)                   Impression:      No acute intracranial findings identified.      Electronically signed by: Boom Escobar  Date:    12/27/2023  Time:    11:03               Narrative:    EXAMINATION:  CT HEAD WITHOUT CONTRAST    CLINICAL HISTORY:  Head trauma, minor (Age >= 65y);    TECHNIQUE:  Sequential axial images were performed of the brain without contrast.    Dose product length of 866 mGycm. Automated exposure control was utilized to minimize radiation dose.    COMPARISON:  None available.    FINDINGS:  Gray-white  matter differentiation is unremarkable.  There is no intracranial mass effect, midline shift, hydrocephalus or hemorrhage. There is no sulcal effacement or low attenuation changes to suggest recent large vessel territory infarction. There is no acute extra axial fluid collection.  There is mild mucoperiosteal thickening of the left ethmoidal air cells.  Otherwise, visualized paranasal sinuses are clear without mucosal thickening, polypoidal abnormality or air-fluid levels. Mastoid air cells aeration is optimal.                                       CT Cervical Spine Without Contrast (Final result)  Result time 12/27/23 10:55:40      Final result by Boom Escobar MD (12/27/23 10:55:40)                   Impression:      No acute fracture or malalignment identified.      Electronically signed by: Boom Escobar  Date:    12/27/2023  Time:    10:55               Narrative:    EXAMINATION:  CT CERVICAL SPINE WITHOUT CONTRAST    CLINICAL HISTORY:  Trauma.    TECHNIQUE:  Multidetector axial images were performed of the cervical spine without and.  Images were reconstructed.    Automated exposure control was utilized to minimize radiation dose.  .    COMPARISON:  None available.    FINDINGS:  Cervical vertebrae stature is maintained and alignment is unremarkable.  No acute fracture or malalignment identified.  There are intervertebral disc space degenerative changes from C3-C4 through C6-C7 with anterior hypertrophic spurrings and osteophyte disc complexes.  These findings cause mild ventral impression upon the ventral thecal sac.  There are no significant narrowings of the neural foramen.  There is no prevertebral soft tissue prominence.    This study does not exclude the possibility of intrathecal soft tissue, ligamentous or vascular injury.                                       CT Maxillofacial Without Contrast (Final result)  Result time 12/27/23 11:00:40      Final result by Fide Ge MD (12/27/23  11:00:40)                   Impression:      No acute fracture identified.      Electronically signed by: Fide Ge  Date:    12/27/2023  Time:    11:00               Narrative:    EXAMINATION:  CT MAXILLOFACIAL WITHOUT CONTRAST    CLINICAL HISTORY:  Facial trauma, blunt;    TECHNIQUE:  Volumetric CT acquisition of the facial bones without contrast. Axial, coronal and sagittal reconstructions.    Automatic exposure control was utilized to limit radiation dose.    DLP: 544 mGy-cm    COMPARISON:  None    FINDINGS:  There is no acute fracture identified.  There is mild scattered paranasal sinus mucosal thickening.  The orbits and soft tissues are unremarkable.                                       X-Ray Chest PA And Lateral (Final result)  Result time 12/27/23 10:15:31      Final result by Yadiel Jessica MD (12/27/23 10:15:31)                   Impression:      No acute cardiopulmonary process.      Electronically signed by: Yadiel Jessica  Date:    12/27/2023  Time:    10:15               Narrative:    EXAMINATION:  XR CHEST PA AND LATERAL    CLINICAL HISTORY:  Other chest pain    TECHNIQUE:  Two views of the chest    COMPARISON:  10/03/2023    FINDINGS:  No focal opacification, pleural effusion, or pneumothorax.    The cardiomediastinal silhouette is within normal limits.    Right-sided MediPort placed in the interval.    No acute osseous abnormality.                                       Medications   potassium bicarbonate disintegrating tablet 40 mEq (40 mEq Oral Given 12/27/23 1343)     Medical Decision Making  Differential diagnosis:  Fall, contusion, fracture, ACS    83-year-old female with a history of arthritis, diabetes, hyperlipidemia, hypertension, osteoporosis and pancreatic cancer presents to ED following trip and fall.  Patient states she was walking on parking lot when her foot caught something on the ground and she fell face 1st. Notes she had some pain across her chest wall after the fall  "and headache as well which has since subsided.  She notes that she was worried because of the MediPort she has in her chest.  Denies LOC, nausea, vomiting, blurred vision, neck pain, back pain, shortness breath.      Amount and/or Complexity of Data Reviewed  Labs:  Decision-making details documented in ED Course.    Risk  Prescription drug management.               ED Course as of 12/27/23 1343   Wed Dec 27, 2023   1247 Potassium(!): 2.9  Patient takes it daily potassium supplement however did not take it this morning.  Will give dose here in the ER prior to discharge. [MA]   1247 Magnesium : 1.90 [MA]   1315 Offered patient Tylenol, ibuprofen and or other pain medication however she declined at this time.  States her symptoms have all but resolved.  States she was hungry and ready to go eat lunch.   [MA]      ED Course User Index  [MA] Garbiel Avila PA-C          BP (!) 143/72   Pulse 69   Temp 97.9 °F (36.6 °C) (Oral)   Resp 16   Ht 5' 2" (1.575 m)   Wt 67.1 kg (148 lb)   SpO2 99%   BMI 27.07 kg/m²                    Clinical Impression:  Final diagnoses:  [R07.89] Chest wall pain  [W19.XXXA] Fall, initial encounter (Primary)          ED Disposition Condition    Discharge Stable          ED Prescriptions    None       Follow-up Information       Follow up With Specialties Details Why Contact Info    Primary care provider  In 2 days As needed     [facility] - Emergency Dept Emergency Medicine In 1 week If symptoms worsen [address] [id number] [phone]             Gabriel Avila PA-C  12/27/23 1344    "

## 2023-12-27 NOTE — FIRST PROVIDER EVALUATION
"Medical screening examination initiated.  I have conducted a focused provider triage encounter, findings are as follows:    Brief history of present illness:  83 year old female presents to ER with c/o fall. Patient states she was walking in parking lot and fell face first. She does not remember what caused her to fall but remembers falling. States that she did hit her face and head. She reports pain to her face, head, and chest. States that the pain in her chest started on right side near her mediport and now is across her entire chest.     Vitals:    12/27/23 0948   BP: (!) 155/73   Pulse: 83   Resp: 16   Temp: 97.9 °F (36.6 °C)   TempSrc: Oral   SpO2: 97%   Weight: 67.1 kg (148 lb)   Height: 5' 2" (1.575 m)       Pertinent physical exam:  Awake and alert, NAD.    Brief workup plan:  Labs, imaging     Preliminary workup initiated; this workup will be continued and followed by the physician or advanced practice provider that is assigned to the patient when roomed.  "

## 2023-12-29 ENCOUNTER — LAB VISIT (OUTPATIENT)
Dept: LAB | Facility: HOSPITAL | Age: 83
End: 2023-12-29
Attending: INTERNAL MEDICINE
Payer: COMMERCIAL

## 2023-12-29 DIAGNOSIS — C25.9 PANCREATIC ADENOCARCINOMA: ICD-10-CM

## 2023-12-29 DIAGNOSIS — C77.2 MALIGNANT NEOPLASM METASTATIC TO INTRA-ABDOMINAL LYMPH NODE: ICD-10-CM

## 2023-12-29 LAB
ALBUMIN SERPL-MCNC: 3.7 G/DL (ref 3.4–4.8)
ALBUMIN/GLOB SERPL: 1.3 RATIO (ref 1.1–2)
ALP SERPL-CCNC: 141 UNIT/L (ref 40–150)
ALT SERPL-CCNC: 18 UNIT/L (ref 0–55)
AST SERPL-CCNC: 29 UNIT/L (ref 5–34)
BASOPHILS # BLD AUTO: 0.04 X10(3)/MCL
BASOPHILS NFR BLD AUTO: 0.3 %
BILIRUB SERPL-MCNC: 0.4 MG/DL
BUN SERPL-MCNC: 12.2 MG/DL (ref 9.8–20.1)
CALCIUM SERPL-MCNC: 9 MG/DL (ref 8.4–10.2)
CANCER AG19-9 SERPL-ACNC: 447.94 UNIT/ML (ref 0–37)
CHLORIDE SERPL-SCNC: 99 MMOL/L (ref 98–107)
CO2 SERPL-SCNC: 31 MMOL/L (ref 23–31)
CREAT SERPL-MCNC: 0.76 MG/DL (ref 0.55–1.02)
EOSINOPHIL # BLD AUTO: 0.15 X10(3)/MCL (ref 0–0.9)
EOSINOPHIL NFR BLD AUTO: 1.2 %
ERYTHROCYTE [DISTWIDTH] IN BLOOD BY AUTOMATED COUNT: 15.7 % (ref 11.5–17)
GFR SERPLBLD CREATININE-BSD FMLA CKD-EPI: >60 MLS/MIN/1.73/M2
GLOBULIN SER-MCNC: 2.8 GM/DL (ref 2.4–3.5)
GLUCOSE SERPL-MCNC: 128 MG/DL (ref 82–115)
HCT VFR BLD AUTO: 38 % (ref 37–47)
HGB BLD-MCNC: 12.4 G/DL (ref 12–16)
IMM GRANULOCYTES # BLD AUTO: 0.09 X10(3)/MCL (ref 0–0.04)
IMM GRANULOCYTES NFR BLD AUTO: 0.7 %
LYMPHOCYTES # BLD AUTO: 2.28 X10(3)/MCL (ref 0.6–4.6)
LYMPHOCYTES NFR BLD AUTO: 18.1 %
MAGNESIUM SERPL-MCNC: 1.8 MG/DL (ref 1.6–2.6)
MCH RBC QN AUTO: 30.6 PG (ref 27–31)
MCHC RBC AUTO-ENTMCNC: 32.6 G/DL (ref 33–36)
MCV RBC AUTO: 93.8 FL (ref 80–94)
MONOCYTES # BLD AUTO: 1.5 X10(3)/MCL (ref 0.1–1.3)
MONOCYTES NFR BLD AUTO: 11.9 %
NEUTROPHILS # BLD AUTO: 8.52 X10(3)/MCL (ref 2.1–9.2)
NEUTROPHILS NFR BLD AUTO: 67.8 %
PLATELET # BLD AUTO: 246 X10(3)/MCL (ref 130–400)
PMV BLD AUTO: 9.7 FL (ref 7.4–10.4)
POTASSIUM SERPL-SCNC: 3.5 MMOL/L (ref 3.5–5.1)
PROT SERPL-MCNC: 6.5 GM/DL (ref 5.8–7.6)
RBC # BLD AUTO: 4.05 X10(6)/MCL (ref 4.2–5.4)
SODIUM SERPL-SCNC: 141 MMOL/L (ref 136–145)
WBC # SPEC AUTO: 12.58 X10(3)/MCL (ref 4.5–11.5)

## 2023-12-29 PROCEDURE — 80053 COMPREHEN METABOLIC PANEL: CPT

## 2023-12-29 PROCEDURE — 86301 IMMUNOASSAY TUMOR CA 19-9: CPT

## 2023-12-29 PROCEDURE — 85025 COMPLETE CBC W/AUTO DIFF WBC: CPT

## 2023-12-29 PROCEDURE — 36415 COLL VENOUS BLD VENIPUNCTURE: CPT

## 2023-12-29 PROCEDURE — 83735 ASSAY OF MAGNESIUM: CPT

## 2023-12-29 RX ORDER — DEXAMETHASONE 4 MG/1
TABLET ORAL
Qty: 6 TABLET | Refills: 5 | Status: SHIPPED | OUTPATIENT
Start: 2023-12-29

## 2024-01-02 ENCOUNTER — INFUSION (OUTPATIENT)
Dept: INFUSION THERAPY | Facility: HOSPITAL | Age: 84
End: 2024-01-02
Attending: INTERNAL MEDICINE
Payer: COMMERCIAL

## 2024-01-02 VITALS
RESPIRATION RATE: 18 BRPM | HEART RATE: 78 BPM | SYSTOLIC BLOOD PRESSURE: 161 MMHG | DIASTOLIC BLOOD PRESSURE: 84 MMHG | HEIGHT: 62 IN | WEIGHT: 148 LBS | BODY MASS INDEX: 27.23 KG/M2

## 2024-01-02 DIAGNOSIS — C25.9 PANCREATIC ADENOCARCINOMA: Primary | ICD-10-CM

## 2024-01-02 PROCEDURE — 96416 CHEMO PROLONG INFUSE W/PUMP: CPT

## 2024-01-02 PROCEDURE — 96413 CHEMO IV INFUSION 1 HR: CPT

## 2024-01-02 PROCEDURE — 25000003 PHARM REV CODE 250: Performed by: NURSE PRACTITIONER

## 2024-01-02 PROCEDURE — 63600175 PHARM REV CODE 636 W HCPCS: Performed by: NURSE PRACTITIONER

## 2024-01-02 PROCEDURE — 96368 THER/DIAG CONCURRENT INF: CPT

## 2024-01-02 PROCEDURE — 96375 TX/PRO/DX INJ NEW DRUG ADDON: CPT

## 2024-01-02 PROCEDURE — 96417 CHEMO IV INFUS EACH ADDL SEQ: CPT

## 2024-01-02 PROCEDURE — 96415 CHEMO IV INFUSION ADDL HR: CPT

## 2024-01-02 RX ORDER — ATROPINE SULFATE 0.4 MG/ML
0.4 INJECTION, SOLUTION ENDOTRACHEAL; INTRAMEDULLARY; INTRAMUSCULAR; INTRAVENOUS; SUBCUTANEOUS ONCE AS NEEDED
Status: COMPLETED | OUTPATIENT
Start: 2024-01-02 | End: 2024-01-02

## 2024-01-02 RX ORDER — FAMOTIDINE 10 MG/ML
20 INJECTION INTRAVENOUS
Status: CANCELLED
Start: 2024-01-02

## 2024-01-02 RX ORDER — EPINEPHRINE 0.3 MG/.3ML
0.3 INJECTION SUBCUTANEOUS ONCE AS NEEDED
Status: CANCELLED | OUTPATIENT
Start: 2024-01-02

## 2024-01-02 RX ORDER — SODIUM CHLORIDE 0.9 % (FLUSH) 0.9 %
10 SYRINGE (ML) INJECTION
Status: CANCELLED | OUTPATIENT
Start: 2024-01-02

## 2024-01-02 RX ORDER — PROCHLORPERAZINE EDISYLATE 5 MG/ML
5 INJECTION INTRAMUSCULAR; INTRAVENOUS ONCE AS NEEDED
Status: CANCELLED | OUTPATIENT
Start: 2024-01-02

## 2024-01-02 RX ORDER — FLUOROURACIL 50 MG/ML
2400 INJECTION, SOLUTION INTRAVENOUS
Status: CANCELLED | OUTPATIENT
Start: 2024-01-02

## 2024-01-02 RX ORDER — HEPARIN 100 UNIT/ML
500 SYRINGE INTRAVENOUS
Status: CANCELLED | OUTPATIENT
Start: 2024-01-02

## 2024-01-02 RX ORDER — DIPHENHYDRAMINE HYDROCHLORIDE 50 MG/ML
25 INJECTION, SOLUTION INTRAMUSCULAR; INTRAVENOUS
Status: CANCELLED
Start: 2024-01-02

## 2024-01-02 RX ORDER — FAMOTIDINE 10 MG/ML
20 INJECTION INTRAVENOUS
Status: COMPLETED | OUTPATIENT
Start: 2024-01-02 | End: 2024-01-02

## 2024-01-02 RX ORDER — DIPHENHYDRAMINE HYDROCHLORIDE 50 MG/ML
50 INJECTION, SOLUTION INTRAMUSCULAR; INTRAVENOUS ONCE AS NEEDED
Status: CANCELLED | OUTPATIENT
Start: 2024-01-02

## 2024-01-02 RX ORDER — SODIUM CHLORIDE 0.9 % (FLUSH) 0.9 %
10 SYRINGE (ML) INJECTION
Status: DISCONTINUED | OUTPATIENT
Start: 2024-01-02 | End: 2024-01-02 | Stop reason: HOSPADM

## 2024-01-02 RX ORDER — SODIUM CHLORIDE 0.9 % (FLUSH) 0.9 %
10 SYRINGE (ML) INJECTION
Status: CANCELLED | OUTPATIENT
Start: 2024-01-04

## 2024-01-02 RX ORDER — DIPHENHYDRAMINE HYDROCHLORIDE 50 MG/ML
50 INJECTION, SOLUTION INTRAMUSCULAR; INTRAVENOUS ONCE AS NEEDED
Status: DISCONTINUED | OUTPATIENT
Start: 2024-01-02 | End: 2024-01-02 | Stop reason: HOSPADM

## 2024-01-02 RX ORDER — EPINEPHRINE 0.3 MG/.3ML
0.3 INJECTION SUBCUTANEOUS ONCE AS NEEDED
Status: DISCONTINUED | OUTPATIENT
Start: 2024-01-02 | End: 2024-01-02 | Stop reason: HOSPADM

## 2024-01-02 RX ORDER — ATROPINE SULFATE 0.4 MG/ML
0.4 INJECTION, SOLUTION ENDOTRACHEAL; INTRAMEDULLARY; INTRAMUSCULAR; INTRAVENOUS; SUBCUTANEOUS ONCE AS NEEDED
Status: CANCELLED | OUTPATIENT
Start: 2024-01-02

## 2024-01-02 RX ORDER — DIPHENHYDRAMINE HYDROCHLORIDE 50 MG/ML
25 INJECTION, SOLUTION INTRAMUSCULAR; INTRAVENOUS
Status: COMPLETED | OUTPATIENT
Start: 2024-01-02 | End: 2024-01-02

## 2024-01-02 RX ORDER — HEPARIN 100 UNIT/ML
500 SYRINGE INTRAVENOUS
Status: DISCONTINUED | OUTPATIENT
Start: 2024-01-02 | End: 2024-01-02 | Stop reason: HOSPADM

## 2024-01-02 RX ORDER — HEPARIN 100 UNIT/ML
500 SYRINGE INTRAVENOUS
Status: CANCELLED | OUTPATIENT
Start: 2024-01-04

## 2024-01-02 RX ORDER — PROCHLORPERAZINE EDISYLATE 5 MG/ML
5 INJECTION INTRAMUSCULAR; INTRAVENOUS ONCE AS NEEDED
Status: DISCONTINUED | OUTPATIENT
Start: 2024-01-02 | End: 2024-01-02 | Stop reason: HOSPADM

## 2024-01-02 RX ADMIN — LEUCOVORIN CALCIUM 650 MG: 200 INJECTION, POWDER, LYOPHILIZED, FOR SOLUTION INTRAMUSCULAR; INTRAVENOUS at 10:01

## 2024-01-02 RX ADMIN — ATROPINE SULFATE 0.4 MG: 0.4 INJECTION, SOLUTION INTRAVENOUS at 12:01

## 2024-01-02 RX ADMIN — OXALIPLATIN 140 MG: 5 INJECTION, SOLUTION INTRAVENOUS at 10:01

## 2024-01-02 RX ADMIN — DIPHENHYDRAMINE HYDROCHLORIDE 25 MG: 50 INJECTION INTRAMUSCULAR; INTRAVENOUS at 10:01

## 2024-01-02 RX ADMIN — FAMOTIDINE 20 MG: 10 INJECTION, SOLUTION INTRAVENOUS at 09:01

## 2024-01-02 RX ADMIN — FLUOROURACIL 4000 MG: 50 INJECTION, SOLUTION INTRAVENOUS at 02:01

## 2024-01-02 RX ADMIN — IRINOTECAN HYDROCHLORIDE 250 MG: 20 INJECTION, SOLUTION INTRAVENOUS at 12:01

## 2024-01-02 RX ADMIN — PALONOSETRON 0.25 MG: 0.25 INJECTION, SOLUTION INTRAVENOUS at 09:01

## 2024-01-04 ENCOUNTER — INFUSION (OUTPATIENT)
Dept: INFUSION THERAPY | Facility: HOSPITAL | Age: 84
End: 2024-01-04
Attending: INTERNAL MEDICINE
Payer: COMMERCIAL

## 2024-01-04 VITALS
DIASTOLIC BLOOD PRESSURE: 75 MMHG | WEIGHT: 148 LBS | SYSTOLIC BLOOD PRESSURE: 144 MMHG | RESPIRATION RATE: 20 BRPM | BODY MASS INDEX: 27.23 KG/M2 | HEART RATE: 72 BPM | HEIGHT: 62 IN

## 2024-01-04 DIAGNOSIS — C25.9 PANCREATIC ADENOCARCINOMA: Primary | ICD-10-CM

## 2024-01-04 PROCEDURE — 96360 HYDRATION IV INFUSION INIT: CPT

## 2024-01-04 PROCEDURE — 25000003 PHARM REV CODE 250: Performed by: NURSE PRACTITIONER

## 2024-01-04 PROCEDURE — 63600175 PHARM REV CODE 636 W HCPCS: Mod: JZ,JG | Performed by: NURSE PRACTITIONER

## 2024-01-04 PROCEDURE — 96372 THER/PROPH/DIAG INJ SC/IM: CPT | Mod: 59

## 2024-01-04 RX ORDER — SODIUM CHLORIDE 0.9 % (FLUSH) 0.9 %
10 SYRINGE (ML) INJECTION
Status: DISCONTINUED | OUTPATIENT
Start: 2024-01-04 | End: 2024-01-04 | Stop reason: HOSPADM

## 2024-01-04 RX ORDER — HEPARIN 100 UNIT/ML
500 SYRINGE INTRAVENOUS
Status: DISCONTINUED | OUTPATIENT
Start: 2024-01-04 | End: 2024-01-04 | Stop reason: HOSPADM

## 2024-01-04 RX ADMIN — PEGFILGRASTIM-CBQV 6 MG: 6 INJECTION, SOLUTION SUBCUTANEOUS at 02:01

## 2024-01-04 RX ADMIN — SODIUM CHLORIDE 1000 ML: 9 INJECTION, SOLUTION INTRAVENOUS at 12:01

## 2024-01-16 ENCOUNTER — INFUSION (OUTPATIENT)
Dept: INFUSION THERAPY | Facility: HOSPITAL | Age: 84
End: 2024-01-16
Attending: INTERNAL MEDICINE
Payer: COMMERCIAL

## 2024-01-16 ENCOUNTER — CLINICAL SUPPORT (OUTPATIENT)
Dept: HEMATOLOGY/ONCOLOGY | Facility: CLINIC | Age: 84
End: 2024-01-16
Payer: COMMERCIAL

## 2024-01-16 ENCOUNTER — OFFICE VISIT (OUTPATIENT)
Dept: HEMATOLOGY/ONCOLOGY | Facility: CLINIC | Age: 84
End: 2024-01-16
Payer: COMMERCIAL

## 2024-01-16 VITALS
HEART RATE: 82 BPM | WEIGHT: 141.56 LBS | DIASTOLIC BLOOD PRESSURE: 84 MMHG | BODY MASS INDEX: 26.05 KG/M2 | HEIGHT: 62 IN | RESPIRATION RATE: 18 BRPM | SYSTOLIC BLOOD PRESSURE: 148 MMHG | OXYGEN SATURATION: 98 %

## 2024-01-16 DIAGNOSIS — G89.3 CANCER ASSOCIATED PAIN: ICD-10-CM

## 2024-01-16 DIAGNOSIS — Z79.899 ON ANTINEOPLASTIC CHEMOTHERAPY: ICD-10-CM

## 2024-01-16 DIAGNOSIS — C25.9 PANCREATIC ADENOCARCINOMA: Primary | ICD-10-CM

## 2024-01-16 DIAGNOSIS — C77.2 MALIGNANT NEOPLASM METASTATIC TO INTRA-ABDOMINAL LYMPH NODE: ICD-10-CM

## 2024-01-16 DIAGNOSIS — K52.1 CHEMOTHERAPY INDUCED DIARRHEA: ICD-10-CM

## 2024-01-16 DIAGNOSIS — T45.1X5A CHEMOTHERAPY INDUCED DIARRHEA: ICD-10-CM

## 2024-01-16 DIAGNOSIS — E87.6 HYPOKALEMIA: ICD-10-CM

## 2024-01-16 PROCEDURE — 99999 PR PBB SHADOW E&M-EST. PATIENT-LVL IV: CPT | Mod: PBBFAC,,, | Performed by: NURSE PRACTITIONER

## 2024-01-16 PROCEDURE — 1160F RVW MEDS BY RX/DR IN RCRD: CPT | Mod: CPTII,S$GLB,, | Performed by: NURSE PRACTITIONER

## 2024-01-16 PROCEDURE — 3288F FALL RISK ASSESSMENT DOCD: CPT | Mod: CPTII,S$GLB,, | Performed by: NURSE PRACTITIONER

## 2024-01-16 PROCEDURE — 99215 OFFICE O/P EST HI 40 MIN: CPT | Mod: S$GLB,,, | Performed by: NURSE PRACTITIONER

## 2024-01-16 PROCEDURE — 3079F DIAST BP 80-89 MM HG: CPT | Mod: CPTII,S$GLB,, | Performed by: NURSE PRACTITIONER

## 2024-01-16 PROCEDURE — 3077F SYST BP >= 140 MM HG: CPT | Mod: CPTII,S$GLB,, | Performed by: NURSE PRACTITIONER

## 2024-01-16 PROCEDURE — 96374 THER/PROPH/DIAG INJ IV PUSH: CPT

## 2024-01-16 PROCEDURE — 1159F MED LIST DOCD IN RCRD: CPT | Mod: CPTII,S$GLB,, | Performed by: NURSE PRACTITIONER

## 2024-01-16 PROCEDURE — 96361 HYDRATE IV INFUSION ADD-ON: CPT

## 2024-01-16 PROCEDURE — 1101F PT FALLS ASSESS-DOCD LE1/YR: CPT | Mod: CPTII,S$GLB,, | Performed by: NURSE PRACTITIONER

## 2024-01-16 RX ORDER — POTASSIUM CHLORIDE 750 MG/1
10 TABLET, EXTENDED RELEASE ORAL 2 TIMES DAILY
Qty: 60 TABLET | Refills: 3 | Status: SHIPPED | OUTPATIENT
Start: 2024-01-16

## 2024-01-16 RX ORDER — CYPROHEPTADINE HYDROCHLORIDE 4 MG/1
4 TABLET ORAL 2 TIMES DAILY
COMMUNITY
Start: 2024-01-10 | End: 2024-06-19

## 2024-01-16 NOTE — PROGRESS NOTES
Subjective:       Patient ID: Darby Gary is a 83 y.o. female.    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  This was staged using CT scan.    Current Treatment:   Modified FOLFIRINOX without the 5FU bolus.  This treatment was started on 10/10/2023    Treatment History:  ERCP on 09/13/2023    HPI:  83-year-old female was seen by her nephrologist, blood work revealed elevated liver enzymes and bilirubin, she was instructed to present to the emergency department on 09/11/2023.  She presented to [facility] Emergency Department on 09/11/2023.  CMP in the emergency department revealed an ALT of 233, AST of 263, alkaline phosphatase of 1113, and a bilirubin of 2.3.  Hepatitis panel was done and was negative, lipase was also within normal limits.  A CEA was normal at 1.96, CA 19 9 was extremely elevated at 2461.89.  The patient underwent a CT scan of the abdomen and pelvis with contrast on 09/11/2023 that revealed suspected obstructive pancreatic head mass measuring 2.2 cm with moderate dilation of the biliary ductal system and severe dilation of the pancreatic duct.  There was 1-2 possible regional enlarged lymph nodes in the periportal region.  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  A biliary sphincterotomy was performed and 1 uncovered metal biliary stent was placed.  Brushings were obtained, cytology revealed adenocarcinoma.  She was discharged home in stable condition on 09/14/2023.  The patient was referred to Medical Oncology.  I saw the patient on 09/25/2023.  She stated that she was feeling okay.  She had no major issues to discuss.  She was seen by Dr. Chucky Olguin on 10/03/2023, underwent diagnostic laparoscopy and port placement on  10/10/2023.  No metastatic disease seen on diagnostic laparoscopy.  Started modified FOLFIRINOX on 10/10/2023.    Interval History:   Patient is here today for a treatment visit.  She started modified FOLFIRINOX on 10/10/2023.  She isnt feeling well today due to abdominal pain and diarrhea.  That it could possibly have been what she ate yesterday and this morning.  She has already had 3 bowel movements this morning.  She states that she has not usually been having issues with abdominal pain and diarrhea following treatment.  She does have some fatigue but overall tolerates treatment very well.     Past Medical History:   Diagnosis Date    Arthritis     Diabetes mellitus     Hyperlipidemia     Hypertension     Osteoporosis     Pancreatic cancer       Past Surgical History:   Procedure Laterality Date    COLONOSCOPY      DIAGNOSTIC LAPAROSCOPY N/A 10/9/2023    Procedure: LAPAROSCOPY, DIAGNOSTIC  Req 12/12:30;  Surgeon: Chucky Olguin MD;  Location: [facility] OR;  Service: General;  Laterality: N/A;    ERCP N/A 9/13/2023    Procedure: ERCP (ENDOSCOPIC RETROGRADE CHOLANGIOPANCREATOGRAPHY)/ cytology brushing;  Surgeon: Erika Ulloa MD;  Location: [facility] ENDOSCOPY;  Service: Gastroenterology;  Laterality: N/A;    ERCP W/ SPHICTEROTOMY  9/13/2023    Procedure: ERCP, WITH SPHINCTEROTOMY;  Surgeon: Erika Ulloa MD;  Location: [facility] ENDOSCOPY;  Service: Gastroenterology;;    ERCP, WITH STENT INSERTION  9/13/2023    Procedure: ERCP, WITH STENT INSERTION;  Surgeon: Erika Ulloa MD;  Location: [facility] ENDOSCOPY;  Service: Gastroenterology;;    ESOPHAGOGASTRODUODENOSCOPY N/A 9/13/2023    Procedure: EGD (ESOPHAGOGASTRODUODENOSCOPY);  Surgeon: Erika Ulloa MD;  Location: [facility] ENDOSCOPY;  Service: Gastroenterology;  Laterality: N/A;    PLACEMENT, MEDIPORT Right 10/9/2023    Procedure: PLACEMENT, MEDIPORT;  Surgeon: Chucky Olguin MD;  Location: [facility] OR;  Service: General;  Laterality: Right;     Social  History     Socioeconomic History    Marital status:    Tobacco Use    Smoking status: Former     Types: Cigarettes    Smokeless tobacco: Never    Tobacco comments:     Quit over 25 years ago.    Substance and Sexual Activity    Alcohol use: Never    Drug use: Never    Sexual activity: Not Currently     Social Determinants of Health     Financial Resource Strain: High Risk (9/13/2023)    Overall Financial Resource Strain (CARDIA)     Difficulty of Paying Living Expenses: Hard   Food Insecurity: Food Insecurity Present (9/13/2023)    Hunger Vital Sign     Worried About Running Out of Food in the Last Year: Sometimes true     Ran Out of Food in the Last Year: Sometimes true   Transportation Needs: No Transportation Needs (9/13/2023)    PRAPARE - Transportation     Lack of Transportation (Medical): No     Lack of Transportation (Non-Medical): No   Physical Activity: Inactive (9/13/2023)    Exercise Vital Sign     Days of Exercise per Week: 0 days     Minutes of Exercise per Session: 0 min   Stress: No Stress Concern Present (9/13/2023)    [employer] [place] [employer] - Occupational Stress Questionnaire     Feeling of Stress : Only a little   Social Connections: Moderately Isolated (9/13/2023)    Social Connection and Isolation Panel [NHANES]     Frequency of Communication with Friends and Family: More than three times a week     Frequency of Social Gatherings with Friends and Family: More than three times a week     Attends Anabaptism Services: More than 4 times per year     Active Member of Clubs or Organizations: No     Attends Club or Organization Meetings: Never     Marital Status:    Housing Stability: Low Risk  (9/13/2023)    Housing Stability Vital Sign     Unable to Pay for Housing in the Last Year: No     Number of Places Lived in the Last Year: 1     Unstable Housing in the Last Year: No      Family History   Problem Relation Age of Onset    Leukemia Father     Prostate cancer Brother      Breast cancer Other 48    Breast cancer Maternal Aunt     Breast cancer Maternal Aunt     Breast cancer Maternal Aunt     Cancer Paternal Uncle       Review of patient's allergies indicates:  No Known Allergies   Review of Systems   Constitutional:  Negative for chills, diaphoresis, fatigue, fever and unexpected weight change.   HENT:  Negative for nasal congestion, mouth sores, sinus pressure/congestion and sore throat.    Eyes:  Negative for pain and visual disturbance.   Respiratory:  Negative for cough, chest tightness and shortness of breath.    Cardiovascular:  Negative for chest pain, palpitations and leg swelling.   Gastrointestinal:  Positive for abdominal pain and diarrhea. Negative for abdominal distention, blood in stool and constipation.   Genitourinary:  Negative for dysuria, frequency and hematuria.   Musculoskeletal:  Negative for arthralgias and back pain.   Integumentary:  Negative for rash.   Neurological:  Negative for dizziness, weakness, numbness and headaches.   Hematological:  Negative for adenopathy.   Psychiatric/Behavioral:  Negative for confusion.          Objective:      Physical Exam  Vitals reviewed.   Constitutional:       General: She is not in acute distress.     Appearance: Normal appearance.   HENT:      Head: Normocephalic and atraumatic.      Nose: Nose normal.      Mouth/Throat:      Mouth: Mucous membranes are moist.   Eyes:      Extraocular Movements: Extraocular movements intact.      Conjunctiva/sclera: Conjunctivae normal.   Cardiovascular:      Rate and Rhythm: Normal rate and regular rhythm.      Pulses: Normal pulses.      Heart sounds: Normal heart sounds.   Pulmonary:      Effort: Pulmonary effort is normal.      Breath sounds: Normal breath sounds.   Abdominal:      General: Bowel sounds are normal.      Palpations: Abdomen is soft.   Musculoskeletal:         General: No swelling. Normal range of motion.      Cervical back: Normal range of motion and neck supple.       Right lower leg: No edema.      Left lower leg: No edema.   Skin:     General: Skin is warm and dry.   Neurological:      General: No focal deficit present.      Mental Status: She is alert and oriented to person, place, and time. Mental status is at baseline.   Psychiatric:         Mood and Affect: Mood normal.         Behavior: Behavior normal.         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  This was staged using CT scan.        Plan:       Patient has at least stage IIB pancreatic cancer based on CT abdomen/pelvis measurements.     PET/CT scan on 10/05/2023- No FDG avid metastatic disease identified. Focal uptake in the pancreatic head about a CBD stent compatible with patient's primary malignancy.    Patient saw Dr. Chucky Olguin on 10/03/2023, underwent port placement and diagnostic laparoscopy on 10/09/2023.  No metastatic disease seen.  Started modified FOLFIRINOX on 10/10/2023.    She has met with Genetics and results were negative.    Ca 19-9 is trending down and doing so consistently.    Will have her try Creon.     Will hold cycle 8 today due to abd pain and diarrhea, Will give IV fluid bolus and atropine.  Plan to treat next week    RTC prior to Cycle 9 with labs: CBC, CMP, Mg, Ca 19-9    KAR Tang

## 2024-01-19 NOTE — PROGRESS NOTES
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 High kcal/high protein diet as tolerated; avoid ingestion of cold foods/beverages during oxaliplatin infusion and for 4-5 days following  2. Creon per MD. Recommend 36,000 units 1 cap with meals 1 cap with snacks  3. Oral supplement of choice at least once daily  4. RD to continue monitoring    Nutrition Assessment    10/3/23: This is a 83 y.o.female with a medical diagnosis of pancreatic CA, s/p ERCP a few weeks ago. She reports ~30# wt loss over the past 6 months or so. Prior to ERCP she was having frequent/loose bowel movements, especially after eating. Stool was normal in color, however. She states no c/o n/v/c, no indigestion or bloating now or prior to ERCP. She states her appetite has always been good. She is tolerating a regular diet at this time. She notes that wt loss seems to have slowed in the past couple weeks    11/9/23: Pt here for infusion. She reports relatively good appetite and po intake. She is trying to eat healthy; some of her overall wt loss has been intentional. No c/o n/v/c/d at this time. Her wt has been stable for ~6 weeks.     12/4/23: Pt here for NP f/u. She notes some days with good appetite and others it is decreased. She has noticed dysgeusia following each cycle of treatment that improves over time. Also noting some "gurgling," possible indigestion/maldigestion? NP discussed pancreatic enzyme trial and pt is amenable. I did provide high kcal/high protein snack list for pt for days when her appetite is lower. She did have wt loss earlier in the year that had stabilized in recent months but she has begun to lose wt " "again. Total wt loss since April is ~45# (~14# in the past 6-8 weeks).     12/18/23: pt here for MD [place]. She reports good appetite and po intake. States "I am eating everything I can find." She did try the Creon and states she thinks it helped her digestion. However, she had not called to have it filled so she never got a prescription. Did note that she lost a further 7# in the past couple weeks. Total loss since April is 52#.     1/19/24: Called and spoke to pt over the phone. Her chemo was held this week per her request 2/2 abd pain/cramping/diarrhea. Pt states she thinks she had a GI bug. She has now recovered and notes her appetite is excellent, she is eating crawfish etouffee and rice and gravy. Noted further wt loss earlier this week since last visit.     Nutrition Factors Affecting Intake  none identified    PMHx: HLD, HTN, osteoporosis      Allergies: Patient has no known allergies.    Current Medications:    Current Outpatient Medications:     aspirin (ECOTRIN) 81 MG EC tablet, Take 1 tablet (81 mg total) by mouth once daily., Disp: 30 tablet, Rfl: 11    cyclobenzaprine HCl (CYCLOBENZAPRINE ORAL), Take 1 tablet by mouth 3 times daily as needed., Disp: , Rfl:     cyproheptadine (PERIACTIN) 4 mg tablet, Take 4 mg by mouth 2 (two) times daily., Disp: , Rfl:     dexAMETHasone (DECADRON) 4 MG Tab, TAKE TWO TABLETS BY MOUTH EVERY DAY ON DAYS 2 TO 3 OF EACH CHEMOTHERAPY CYCLE, Disp: 6 tablet, Rfl: 5    ergocalciferol (ERGOCALCIFEROL) 50,000 unit Cap, Take 50,000 Units by mouth every 7 days., Disp: , Rfl:     gabapentin (NEURONTIN) 300 MG capsule, Take 300-600 mg by mouth every evening., Disp: , Rfl:     lisinopriL-hydrochlorothiazide (PRINZIDE,ZESTORETIC) 10-12.5 mg per tablet, , Disp: , Rfl:     loperamide (IMODIUM) 2 mg capsule, Take 2 capsules by mouth at the onset of diarrhea then 1 capsule every 2 hours until diarrhea free for 12 hours. (Patient not taking: Reported on 12/18/2023), Disp: 30 capsule, Rfl: 11   " " metFORMIN (GLUCOPHAGE) 500 MG tablet, Take 500 mg by mouth once daily., Disp: , Rfl:     mirtazapine (REMERON) 7.5 MG Tab, Take 1 tablet (7.5 mg total) by mouth every evening. (Patient not taking: Reported on 2023), Disp: 30 tablet, Rfl: 3    mupirocin (BACTROBAN) 2 % ointment, mupirocin 2 % topical ointment, Disp: , Rfl:     ondansetron (ZOFRAN-ODT) 8 MG TbDL, Take 1 tablet (8 mg total) by mouth every 6 (six) hours as needed (Nausea)., Disp: 60 tablet, Rfl: 1    potassium chloride (KLOR-CON) 10 MEQ TbSR, Take 1 tablet (10 mEq total) by mouth 2 (two) times daily., Disp: 60 tablet, Rfl: 3    prochlorperazine (COMPAZINE) 5 MG tablet, Take 1 tablet (5 mg total) by mouth every 6 (six) hours as needed for Nausea., Disp: 20 tablet, Rfl: 5    PROCTO-MED HC 2.5 % rectal cream, SMARTSIG:Topical, Disp: , Rfl:     simvastatin (ZOCOR) 40 MG tablet, SMARTSI Tablet(s) By Mouth Every Evening, Disp: , Rfl:     simvastatin (ZOCOR) 40 MG tablet, Take 40 mg by mouth once daily., Disp: , Rfl:     triamcinolone acetonide 0.025% (KENALOG) 0.025 % cream, triamcinolone acetonide 0.025 % topical cream  APPLY TO THE AFFECTED AREA(S) ONCE a DAY, Disp: , Rfl:     valACYclovir (VALTREX) 1000 MG tablet, Take 1 tablet (1,000 mg total) by mouth 2 (two) times daily., Disp: 14 tablet, Rfl: 0    Labs: 24 K+ 2.9 (L), glu 149 (H)    Anthropometrics    Height:   Ht Readings from Last 1 Encounters:   24 5' 2" (1.575 m)      Weight:   Wt Readings from Last 5 Encounters:   24 64.2 kg (141 lb 8.6 oz)   24 64.2 kg (141 lb 8.6 oz)   24 67.1 kg (147 lb 15.9 oz)   24 67.1 kg (147 lb 15.9 oz)   23 67.1 kg (148 lb)        Usual Body Weight: 87.4 kg (192 lb)     % Weight Change: -26.5% past 9 months to lowest wt 141 lb    BMI: 25.8 (overweight)    Ideal Weight: 50 kg (110 lb)  % Ideal Weight: 128%    Malnutrition in the context of chronic illness  Degree of Malnutrition: non-severe (moderate) malnutrition  Energy " Intake: < 75% of estimated energy requirement for >/= 1 month  Interpretation of Weight Loss: >10% in 6 months  Body Fat: mild depletion  Area of Body Fat Loss: orbital region , upper arm region - triceps / biceps, and thoracic and lumbar region - ribs, lower back, midaxillary line  Muscle Mass Loss: mild depletion  Area of Muscle Mass Loss: temple region - temporalis muscle, clavicle bone region - pectoralis major, deltoid, trapezius muscles, clavicle and acromion bone region - deltoid muscle, scapular bone region - trapezius, supraspinus, infraspinus muscles, dorsal hand - interosseous musle, patellar region - quadricep muscle, anterior thigh region - quadriceps muscles, and posterior calf region - gastrocnemius muscle  Fluid Accumulation: does not meet criteria  Edema: no edema present  Reduced  Strength: unable to obtain  A minimum of two characteristics is recommended for diagnosis of either severe or non-severe malnutrition.    Estimated Needs (using CBW 64.2 kg)  1750 kcal/day  [place]. Jeor x 1.1 activity factor x 1.5 stress factor  96 g protein/day 1.5 g/kg CBW  1750 ml fluid/day 1 mL/kcal    Nutrition Diagnosis    PES: Malnutrition related to chronic illness as evidenced by <75% intake est needs >/=1 month, >10% wt loss 6 months, mild muscle/fat wasting. (active)     Nutrition Risk  moderate      Nutrition Intervention    Interventions(treatment strategy):  modified composition of meals/snacks, commercial beverage, prescription medication, and collaboration with other providers      Nutrition Monitoring and Evaluation    Monitor: food and beverage intake, weight change, and electrolyte/renal panel    Follow up next [facility] provider visit.        Trinity Rubin, MS, RD, , LDN

## 2024-01-22 DIAGNOSIS — C25.9 PANCREATIC ADENOCARCINOMA: Primary | ICD-10-CM

## 2024-01-23 ENCOUNTER — INFUSION (OUTPATIENT)
Dept: INFUSION THERAPY | Facility: HOSPITAL | Age: 84
End: 2024-01-23
Attending: INTERNAL MEDICINE
Payer: COMMERCIAL

## 2024-01-23 VITALS
RESPIRATION RATE: 18 BRPM | WEIGHT: 141.56 LBS | SYSTOLIC BLOOD PRESSURE: 115 MMHG | HEART RATE: 76 BPM | TEMPERATURE: 98 F | DIASTOLIC BLOOD PRESSURE: 70 MMHG | HEIGHT: 62 IN | BODY MASS INDEX: 26.05 KG/M2

## 2024-01-23 DIAGNOSIS — C25.9 PANCREATIC ADENOCARCINOMA: Primary | ICD-10-CM

## 2024-01-23 PROCEDURE — 96368 THER/DIAG CONCURRENT INF: CPT

## 2024-01-23 PROCEDURE — 96417 CHEMO IV INFUS EACH ADDL SEQ: CPT

## 2024-01-23 PROCEDURE — 96416 CHEMO PROLONG INFUSE W/PUMP: CPT

## 2024-01-23 PROCEDURE — 96413 CHEMO IV INFUSION 1 HR: CPT

## 2024-01-23 PROCEDURE — 96415 CHEMO IV INFUSION ADDL HR: CPT

## 2024-01-23 PROCEDURE — 63600175 PHARM REV CODE 636 W HCPCS: Performed by: NURSE PRACTITIONER

## 2024-01-23 PROCEDURE — 96375 TX/PRO/DX INJ NEW DRUG ADDON: CPT

## 2024-01-23 PROCEDURE — 25000003 PHARM REV CODE 250: Performed by: NURSE PRACTITIONER

## 2024-01-23 RX ORDER — SODIUM CHLORIDE 0.9 % (FLUSH) 0.9 %
10 SYRINGE (ML) INJECTION
Status: DISCONTINUED | OUTPATIENT
Start: 2024-01-23 | End: 2024-01-23 | Stop reason: HOSPADM

## 2024-01-23 RX ORDER — HEPARIN 100 UNIT/ML
500 SYRINGE INTRAVENOUS
Status: CANCELLED | OUTPATIENT
Start: 2024-01-24

## 2024-01-23 RX ORDER — PROCHLORPERAZINE EDISYLATE 5 MG/ML
5 INJECTION INTRAMUSCULAR; INTRAVENOUS ONCE AS NEEDED
Status: DISCONTINUED | OUTPATIENT
Start: 2024-01-23 | End: 2024-01-23 | Stop reason: HOSPADM

## 2024-01-23 RX ORDER — DIPHENHYDRAMINE HYDROCHLORIDE 50 MG/ML
50 INJECTION INTRAMUSCULAR; INTRAVENOUS ONCE AS NEEDED
Status: DISCONTINUED | OUTPATIENT
Start: 2024-01-23 | End: 2024-01-23 | Stop reason: HOSPADM

## 2024-01-23 RX ORDER — ATROPINE SULFATE 0.4 MG/ML
0.4 INJECTION, SOLUTION ENDOTRACHEAL; INTRAMEDULLARY; INTRAMUSCULAR; INTRAVENOUS; SUBCUTANEOUS ONCE AS NEEDED
Status: CANCELLED | OUTPATIENT
Start: 2024-01-23

## 2024-01-23 RX ORDER — FAMOTIDINE 10 MG/ML
20 INJECTION INTRAVENOUS
Status: CANCELLED
Start: 2024-01-23

## 2024-01-23 RX ORDER — FAMOTIDINE 10 MG/ML
20 INJECTION INTRAVENOUS
Status: COMPLETED | OUTPATIENT
Start: 2024-01-23 | End: 2024-01-23

## 2024-01-23 RX ORDER — EPINEPHRINE 0.3 MG/.3ML
0.3 INJECTION SUBCUTANEOUS ONCE AS NEEDED
Status: DISCONTINUED | OUTPATIENT
Start: 2024-01-23 | End: 2024-01-23 | Stop reason: HOSPADM

## 2024-01-23 RX ORDER — ATROPINE SULFATE 0.4 MG/ML
0.4 INJECTION, SOLUTION ENDOTRACHEAL; INTRAMEDULLARY; INTRAMUSCULAR; INTRAVENOUS; SUBCUTANEOUS ONCE AS NEEDED
Status: COMPLETED | OUTPATIENT
Start: 2024-01-23 | End: 2024-01-23

## 2024-01-23 RX ORDER — DIPHENHYDRAMINE HYDROCHLORIDE 50 MG/ML
25 INJECTION INTRAMUSCULAR; INTRAVENOUS
Status: COMPLETED | OUTPATIENT
Start: 2024-01-23 | End: 2024-01-23

## 2024-01-23 RX ORDER — SODIUM CHLORIDE 0.9 % (FLUSH) 0.9 %
10 SYRINGE (ML) INJECTION
Status: CANCELLED | OUTPATIENT
Start: 2024-01-23

## 2024-01-23 RX ORDER — HEPARIN 100 UNIT/ML
500 SYRINGE INTRAVENOUS
Status: CANCELLED | OUTPATIENT
Start: 2024-01-23

## 2024-01-23 RX ORDER — HEPARIN 100 UNIT/ML
500 SYRINGE INTRAVENOUS
Status: DISCONTINUED | OUTPATIENT
Start: 2024-01-23 | End: 2024-01-23 | Stop reason: HOSPADM

## 2024-01-23 RX ORDER — FLUOROURACIL 50 MG/ML
2400 INJECTION, SOLUTION INTRAVENOUS
Status: CANCELLED | OUTPATIENT
Start: 2024-01-23

## 2024-01-23 RX ORDER — DIPHENHYDRAMINE HYDROCHLORIDE 50 MG/ML
50 INJECTION INTRAMUSCULAR; INTRAVENOUS ONCE AS NEEDED
Status: CANCELLED | OUTPATIENT
Start: 2024-01-23

## 2024-01-23 RX ORDER — SODIUM CHLORIDE 0.9 % (FLUSH) 0.9 %
10 SYRINGE (ML) INJECTION
Status: CANCELLED | OUTPATIENT
Start: 2024-01-24

## 2024-01-23 RX ORDER — EPINEPHRINE 0.3 MG/.3ML
0.3 INJECTION SUBCUTANEOUS ONCE AS NEEDED
Status: CANCELLED | OUTPATIENT
Start: 2024-01-23

## 2024-01-23 RX ORDER — PROCHLORPERAZINE EDISYLATE 5 MG/ML
5 INJECTION INTRAMUSCULAR; INTRAVENOUS ONCE AS NEEDED
Status: CANCELLED | OUTPATIENT
Start: 2024-01-23

## 2024-01-23 RX ORDER — DIPHENHYDRAMINE HYDROCHLORIDE 50 MG/ML
25 INJECTION INTRAMUSCULAR; INTRAVENOUS
Status: CANCELLED
Start: 2024-01-23

## 2024-01-23 RX ADMIN — LEUCOVORIN CALCIUM 650 MG: 200 INJECTION, POWDER, LYOPHILIZED, FOR SOLUTION INTRAMUSCULAR; INTRAVENOUS at 11:01

## 2024-01-23 RX ADMIN — FAMOTIDINE 20 MG: 10 INJECTION INTRAVENOUS at 09:01

## 2024-01-23 RX ADMIN — DEXAMETHASONE SODIUM PHOSPHATE 0.25 MG: 4 INJECTION, SOLUTION INTRA-ARTICULAR; INTRALESIONAL; INTRAMUSCULAR; INTRAVENOUS; SOFT TISSUE at 09:01

## 2024-01-23 RX ADMIN — DIPHENHYDRAMINE HYDROCHLORIDE 25 MG: 50 INJECTION INTRAMUSCULAR; INTRAVENOUS at 09:01

## 2024-01-23 RX ADMIN — ATROPINE SULFATE 0.4 MG: 0.4 INJECTION, SOLUTION INTRAVENOUS at 11:01

## 2024-01-23 RX ADMIN — IRINOTECAN HYDROCHLORIDE 250 MG: 20 INJECTION, SOLUTION INTRAVENOUS at 11:01

## 2024-01-23 RX ADMIN — OXALIPLATIN 140 MG: 5 INJECTION, SOLUTION, CONCENTRATE INTRAVENOUS at 09:01

## 2024-01-23 RX ADMIN — FLUOROURACIL 4000 MG: 50 INJECTION, SOLUTION INTRAVENOUS at 01:01

## 2024-01-25 ENCOUNTER — INFUSION (OUTPATIENT)
Dept: INFUSION THERAPY | Facility: HOSPITAL | Age: 84
End: 2024-01-25
Attending: INTERNAL MEDICINE
Payer: COMMERCIAL

## 2024-01-25 VITALS
TEMPERATURE: 99 F | HEART RATE: 68 BPM | SYSTOLIC BLOOD PRESSURE: 133 MMHG | OXYGEN SATURATION: 98 % | DIASTOLIC BLOOD PRESSURE: 78 MMHG

## 2024-01-25 DIAGNOSIS — C25.9 PANCREATIC ADENOCARCINOMA: Primary | ICD-10-CM

## 2024-01-25 PROCEDURE — 25000003 PHARM REV CODE 250: Performed by: NURSE PRACTITIONER

## 2024-01-25 PROCEDURE — 63600175 PHARM REV CODE 636 W HCPCS: Performed by: NURSE PRACTITIONER

## 2024-01-25 PROCEDURE — A4216 STERILE WATER/SALINE, 10 ML: HCPCS | Performed by: NURSE PRACTITIONER

## 2024-01-25 PROCEDURE — 96372 THER/PROPH/DIAG INJ SC/IM: CPT | Mod: 59

## 2024-01-25 PROCEDURE — 96360 HYDRATION IV INFUSION INIT: CPT

## 2024-01-25 RX ORDER — HEPARIN 100 UNIT/ML
500 SYRINGE INTRAVENOUS
Status: DISCONTINUED | OUTPATIENT
Start: 2024-01-25 | End: 2024-01-25 | Stop reason: HOSPADM

## 2024-01-25 RX ORDER — SODIUM CHLORIDE 0.9 % (FLUSH) 0.9 %
10 SYRINGE (ML) INJECTION
Status: DISCONTINUED | OUTPATIENT
Start: 2024-01-25 | End: 2024-01-25 | Stop reason: HOSPADM

## 2024-01-25 RX ADMIN — PEGFILGRASTIM-CBQV 6 MG: 6 INJECTION, SOLUTION SUBCUTANEOUS at 11:01

## 2024-01-25 RX ADMIN — SODIUM CHLORIDE 1000 ML: 9 INJECTION, SOLUTION INTRAVENOUS at 10:01

## 2024-01-25 RX ADMIN — HEPARIN 500 UNITS: 100 SYRINGE at 11:01

## 2024-01-25 RX ADMIN — Medication 10 ML: at 11:01

## 2024-02-06 ENCOUNTER — OFFICE VISIT (OUTPATIENT)
Dept: HEMATOLOGY/ONCOLOGY | Facility: CLINIC | Age: 84
End: 2024-02-06
Payer: COMMERCIAL

## 2024-02-06 ENCOUNTER — CLINICAL SUPPORT (OUTPATIENT)
Dept: HEMATOLOGY/ONCOLOGY | Facility: CLINIC | Age: 84
End: 2024-02-06
Payer: COMMERCIAL

## 2024-02-06 ENCOUNTER — INFUSION (OUTPATIENT)
Dept: INFUSION THERAPY | Facility: HOSPITAL | Age: 84
End: 2024-02-06
Attending: INTERNAL MEDICINE
Payer: COMMERCIAL

## 2024-02-06 VITALS
DIASTOLIC BLOOD PRESSURE: 77 MMHG | OXYGEN SATURATION: 99 % | WEIGHT: 138 LBS | BODY MASS INDEX: 25.4 KG/M2 | HEART RATE: 72 BPM | RESPIRATION RATE: 18 BRPM | SYSTOLIC BLOOD PRESSURE: 125 MMHG | HEIGHT: 62 IN

## 2024-02-06 DIAGNOSIS — C25.9 PANCREATIC ADENOCARCINOMA: Primary | ICD-10-CM

## 2024-02-06 DIAGNOSIS — E87.6 HYPOKALEMIA: ICD-10-CM

## 2024-02-06 DIAGNOSIS — Z79.899 ON ANTINEOPLASTIC CHEMOTHERAPY: ICD-10-CM

## 2024-02-06 DIAGNOSIS — C77.2 MALIGNANT NEOPLASM METASTATIC TO INTRA-ABDOMINAL LYMPH NODE: ICD-10-CM

## 2024-02-06 PROCEDURE — 96368 THER/DIAG CONCURRENT INF: CPT

## 2024-02-06 PROCEDURE — 3288F FALL RISK ASSESSMENT DOCD: CPT | Mod: CPTII,S$GLB,, | Performed by: NURSE PRACTITIONER

## 2024-02-06 PROCEDURE — 96416 CHEMO PROLONG INFUSE W/PUMP: CPT

## 2024-02-06 PROCEDURE — 96366 THER/PROPH/DIAG IV INF ADDON: CPT

## 2024-02-06 PROCEDURE — 96367 TX/PROPH/DG ADDL SEQ IV INF: CPT

## 2024-02-06 PROCEDURE — 99999 PR PBB SHADOW E&M-EST. PATIENT-LVL IV: CPT | Mod: PBBFAC,,, | Performed by: NURSE PRACTITIONER

## 2024-02-06 PROCEDURE — 96413 CHEMO IV INFUSION 1 HR: CPT

## 2024-02-06 PROCEDURE — 25000003 PHARM REV CODE 250: Performed by: NURSE PRACTITIONER

## 2024-02-06 PROCEDURE — 96417 CHEMO IV INFUS EACH ADDL SEQ: CPT

## 2024-02-06 PROCEDURE — 3078F DIAST BP <80 MM HG: CPT | Mod: CPTII,S$GLB,, | Performed by: NURSE PRACTITIONER

## 2024-02-06 PROCEDURE — 1159F MED LIST DOCD IN RCRD: CPT | Mod: CPTII,S$GLB,, | Performed by: NURSE PRACTITIONER

## 2024-02-06 PROCEDURE — 96415 CHEMO IV INFUSION ADDL HR: CPT

## 2024-02-06 PROCEDURE — 96375 TX/PRO/DX INJ NEW DRUG ADDON: CPT

## 2024-02-06 PROCEDURE — 1160F RVW MEDS BY RX/DR IN RCRD: CPT | Mod: CPTII,S$GLB,, | Performed by: NURSE PRACTITIONER

## 2024-02-06 PROCEDURE — 99215 OFFICE O/P EST HI 40 MIN: CPT | Mod: S$GLB,,, | Performed by: NURSE PRACTITIONER

## 2024-02-06 PROCEDURE — 1101F PT FALLS ASSESS-DOCD LE1/YR: CPT | Mod: CPTII,S$GLB,, | Performed by: NURSE PRACTITIONER

## 2024-02-06 PROCEDURE — 63600175 PHARM REV CODE 636 W HCPCS: Performed by: NURSE PRACTITIONER

## 2024-02-06 PROCEDURE — 3074F SYST BP LT 130 MM HG: CPT | Mod: CPTII,S$GLB,, | Performed by: NURSE PRACTITIONER

## 2024-02-06 RX ORDER — DIPHENHYDRAMINE HYDROCHLORIDE 50 MG/ML
25 INJECTION INTRAMUSCULAR; INTRAVENOUS
Status: CANCELLED
Start: 2024-02-06

## 2024-02-06 RX ORDER — ATROPINE SULFATE 0.4 MG/ML
0.4 INJECTION, SOLUTION ENDOTRACHEAL; INTRAMEDULLARY; INTRAMUSCULAR; INTRAVENOUS; SUBCUTANEOUS ONCE AS NEEDED
Status: COMPLETED | OUTPATIENT
Start: 2024-02-06 | End: 2024-02-06

## 2024-02-06 RX ORDER — ATROPINE SULFATE 0.4 MG/ML
0.4 INJECTION, SOLUTION ENDOTRACHEAL; INTRAMEDULLARY; INTRAMUSCULAR; INTRAVENOUS; SUBCUTANEOUS ONCE AS NEEDED
Status: CANCELLED | OUTPATIENT
Start: 2024-02-06

## 2024-02-06 RX ORDER — FAMOTIDINE 10 MG/ML
20 INJECTION INTRAVENOUS
Status: CANCELLED
Start: 2024-02-06

## 2024-02-06 RX ORDER — PROCHLORPERAZINE EDISYLATE 5 MG/ML
5 INJECTION INTRAMUSCULAR; INTRAVENOUS ONCE AS NEEDED
Status: CANCELLED | OUTPATIENT
Start: 2024-02-06

## 2024-02-06 RX ORDER — HEPARIN 100 UNIT/ML
500 SYRINGE INTRAVENOUS
Status: CANCELLED | OUTPATIENT
Start: 2024-02-07

## 2024-02-06 RX ORDER — FLUOROURACIL 50 MG/ML
2400 INJECTION, SOLUTION INTRAVENOUS
Status: CANCELLED | OUTPATIENT
Start: 2024-02-06

## 2024-02-06 RX ORDER — SODIUM CHLORIDE 0.9 % (FLUSH) 0.9 %
10 SYRINGE (ML) INJECTION
Status: DISCONTINUED | OUTPATIENT
Start: 2024-02-06 | End: 2024-02-06 | Stop reason: HOSPADM

## 2024-02-06 RX ORDER — EPINEPHRINE 0.3 MG/.3ML
0.3 INJECTION SUBCUTANEOUS ONCE AS NEEDED
Status: DISCONTINUED | OUTPATIENT
Start: 2024-02-06 | End: 2024-02-06 | Stop reason: HOSPADM

## 2024-02-06 RX ORDER — DIPHENHYDRAMINE HYDROCHLORIDE 50 MG/ML
50 INJECTION INTRAMUSCULAR; INTRAVENOUS ONCE AS NEEDED
Status: DISCONTINUED | OUTPATIENT
Start: 2024-02-06 | End: 2024-02-06 | Stop reason: HOSPADM

## 2024-02-06 RX ORDER — FAMOTIDINE 10 MG/ML
20 INJECTION INTRAVENOUS
Status: COMPLETED | OUTPATIENT
Start: 2024-02-06 | End: 2024-02-06

## 2024-02-06 RX ORDER — SODIUM CHLORIDE 0.9 % (FLUSH) 0.9 %
10 SYRINGE (ML) INJECTION
Status: CANCELLED | OUTPATIENT
Start: 2024-02-07

## 2024-02-06 RX ORDER — PANCRELIPASE 36000; 180000; 114000 [USP'U]/1; [USP'U]/1; [USP'U]/1
CAPSULE, DELAYED RELEASE PELLETS ORAL
COMMUNITY
End: 2024-04-29 | Stop reason: SDUPTHER

## 2024-02-06 RX ORDER — DIPHENHYDRAMINE HYDROCHLORIDE 50 MG/ML
25 INJECTION INTRAMUSCULAR; INTRAVENOUS
Status: COMPLETED | OUTPATIENT
Start: 2024-02-06 | End: 2024-02-06

## 2024-02-06 RX ORDER — PROCHLORPERAZINE EDISYLATE 5 MG/ML
5 INJECTION INTRAMUSCULAR; INTRAVENOUS ONCE AS NEEDED
Status: DISCONTINUED | OUTPATIENT
Start: 2024-02-06 | End: 2024-02-06 | Stop reason: HOSPADM

## 2024-02-06 RX ORDER — DIPHENHYDRAMINE HYDROCHLORIDE 50 MG/ML
50 INJECTION INTRAMUSCULAR; INTRAVENOUS ONCE AS NEEDED
Status: CANCELLED | OUTPATIENT
Start: 2024-02-06

## 2024-02-06 RX ORDER — SODIUM CHLORIDE 0.9 % (FLUSH) 0.9 %
10 SYRINGE (ML) INJECTION
Status: CANCELLED | OUTPATIENT
Start: 2024-02-06

## 2024-02-06 RX ORDER — HEPARIN 100 UNIT/ML
500 SYRINGE INTRAVENOUS
Status: CANCELLED | OUTPATIENT
Start: 2024-02-06

## 2024-02-06 RX ORDER — EPINEPHRINE 0.3 MG/.3ML
0.3 INJECTION SUBCUTANEOUS ONCE AS NEEDED
Status: CANCELLED | OUTPATIENT
Start: 2024-02-06

## 2024-02-06 RX ORDER — HEPARIN 100 UNIT/ML
500 SYRINGE INTRAVENOUS
Status: DISCONTINUED | OUTPATIENT
Start: 2024-02-06 | End: 2024-02-06 | Stop reason: HOSPADM

## 2024-02-06 RX ADMIN — DIPHENHYDRAMINE HYDROCHLORIDE 25 MG: 50 INJECTION INTRAMUSCULAR; INTRAVENOUS at 10:02

## 2024-02-06 RX ADMIN — LEUCOVORIN CALCIUM 650 MG: 200 INJECTION, POWDER, LYOPHILIZED, FOR SOLUTION INTRAMUSCULAR; INTRAVENOUS at 12:02

## 2024-02-06 RX ADMIN — OXALIPLATIN 140 MG: 5 INJECTION, SOLUTION INTRAVENOUS at 10:02

## 2024-02-06 RX ADMIN — DEXAMETHASONE SODIUM PHOSPHATE 0.25 MG: 4 INJECTION, SOLUTION INTRA-ARTICULAR; INTRALESIONAL; INTRAMUSCULAR; INTRAVENOUS; SOFT TISSUE at 10:02

## 2024-02-06 RX ADMIN — FAMOTIDINE 20 MG: 10 INJECTION INTRAVENOUS at 10:02

## 2024-02-06 RX ADMIN — SODIUM CHLORIDE: 9 INJECTION, SOLUTION INTRAVENOUS at 09:02

## 2024-02-06 RX ADMIN — DEXTROSE MONOHYDRATE: 50 INJECTION, SOLUTION INTRAVENOUS at 10:02

## 2024-02-06 RX ADMIN — ATROPINE SULFATE 0.4 MG: 0.4 INJECTION, SOLUTION INTRAVENOUS at 12:02

## 2024-02-06 RX ADMIN — IRINOTECAN HYDROCHLORIDE 250 MG: 20 INJECTION, SOLUTION INTRAVENOUS at 12:02

## 2024-02-06 RX ADMIN — FLUOROURACIL 4000 MG: 50 INJECTION, SOLUTION INTRAVENOUS at 02:02

## 2024-02-06 NOTE — PROGRESS NOTES
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 High kcal/high protein diet as tolerated; avoid ingestion of cold foods/beverages during oxaliplatin infusion and for 4-5 days following  2. Creon per MD. Recommend 36,000 units 1 cap with meals 1 cap with snacks  3. Oral supplement of choice at least once daily  4. RD to continue monitoring    Nutrition Assessment    10/3/23: This is a 83 y.o.female with a medical diagnosis of pancreatic CA, s/p ERCP a few weeks ago. She reports ~30# wt loss over the past 6 months or so. Prior to ERCP she was having frequent/loose bowel movements, especially after eating. Stool was normal in color, however. She states no c/o n/v/c, no indigestion or bloating now or prior to ERCP. She states her appetite has always been good. She is tolerating a regular diet at this time. She notes that wt loss seems to have slowed in the past couple weeks    11/9/23: Pt here for infusion. She reports relatively good appetite and po intake. She is trying to eat healthy; some of her overall wt loss has been intentional. No c/o n/v/c/d at this time. Her wt has been stable for ~6 weeks.     12/4/23: Pt here for NP f/u. She notes some days with good appetite and others it is decreased. She has noticed dysgeusia following each cycle of treatment that improves over time. Also noting some "gurgling," possible indigestion/maldigestion? NP discussed pancreatic enzyme trial and pt is amenable. I did provide high kcal/high protein snack list for pt for days when her appetite is lower. She did have wt loss earlier in the year that had stabilized in recent months but she has begun to lose wt " "again. Total wt loss since April is ~45# (~14# in the past 6-8 weeks).     12/18/23: pt here for MD [place]. She reports good appetite and po intake. States "I am eating everything I can find." She did try the Creon and states she thinks it helped her digestion. However, she had not called to have it filled so she never got a prescription. Did note that she lost a further 7# in the past couple weeks. Total loss since April is 52#.     1/19/24: Called and spoke to pt over the phone. Her chemo was held this week per her request 2/2 abd pain/cramping/diarrhea. Pt states she thinks she had a GI bug. She has now recovered and notes her appetite is excellent, she is eating crawfish etouffee and rice and gravy. Noted further wt loss earlier this week since last visit.     2/6/24: Pt here for NP f/solis. She reports good appetite and po intake, but her wt continues to decline slowly. Since last visit no major n/v/c/d. She does have some fatigue.      Nutrition Factors Affecting Intake  none identified    PMHx: HLD, HTN, osteoporosis     Allergies: Patient has no known allergies.    Current Medications:    Current Outpatient Medications:     aspirin (ECOTRIN) 81 MG EC tablet, Take 1 tablet (81 mg total) by mouth once daily., Disp: 30 tablet, Rfl: 11    cyclobenzaprine HCl (CYCLOBENZAPRINE ORAL), Take 1 tablet by mouth 3 times daily as needed., Disp: , Rfl:     cyproheptadine (PERIACTIN) 4 mg tablet, Take 4 mg by mouth 2 (two) times daily., Disp: , Rfl:     dexAMETHasone (DECADRON) 4 MG Tab, TAKE TWO TABLETS BY MOUTH EVERY DAY ON DAYS 2 TO 3 OF EACH CHEMOTHERAPY CYCLE, Disp: 6 tablet, Rfl: 5    ergocalciferol (ERGOCALCIFEROL) 50,000 unit Cap, Take 50,000 Units by mouth every 7 days., Disp: , Rfl:     gabapentin (NEURONTIN) 300 MG capsule, Take 300-600 mg by mouth every evening., Disp: , Rfl:     lipase-protease-amylase (CREON) 36,000-114,000- 180,000 unit CpDR, , Disp: , Rfl:     lisinopriL-hydrochlorothiazide (PRINZIDE,ZESTORETIC) " 10-12.5 mg per tablet, , Disp: , Rfl:     loperamide (IMODIUM) 2 mg capsule, Take 2 capsules by mouth at the onset of diarrhea then 1 capsule every 2 hours until diarrhea free for 12 hours. (Patient not taking: Reported on 2023), Disp: 30 capsule, Rfl: 11    metFORMIN (GLUCOPHAGE) 500 MG tablet, Take 500 mg by mouth once daily., Disp: , Rfl:     mirtazapine (REMERON) 7.5 MG Tab, Take 1 tablet (7.5 mg total) by mouth every evening. (Patient not taking: Reported on 2023), Disp: 30 tablet, Rfl: 3    mupirocin (BACTROBAN) 2 % ointment, mupirocin 2 % topical ointment, Disp: , Rfl:     ondansetron (ZOFRAN-ODT) 8 MG TbDL, Take 1 tablet (8 mg total) by mouth every 6 (six) hours as needed (Nausea)., Disp: 60 tablet, Rfl: 1    potassium chloride (KLOR-CON) 10 MEQ TbSR, Take 1 tablet (10 mEq total) by mouth 2 (two) times daily., Disp: 60 tablet, Rfl: 3    prochlorperazine (COMPAZINE) 5 MG tablet, Take 1 tablet (5 mg total) by mouth every 6 (six) hours as needed for Nausea., Disp: 20 tablet, Rfl: 5    PROCTO-MED HC 2.5 % rectal cream, SMARTSIG:Topical, Disp: , Rfl:     simvastatin (ZOCOR) 40 MG tablet, SMARTSI Tablet(s) By Mouth Every Evening, Disp: , Rfl:     simvastatin (ZOCOR) 40 MG tablet, Take 40 mg by mouth once daily., Disp: , Rfl:     triamcinolone acetonide 0.025% (KENALOG) 0.025 % cream, triamcinolone acetonide 0.025 % topical cream  APPLY TO THE AFFECTED AREA(S) ONCE a DAY, Disp: , Rfl:     valACYclovir (VALTREX) 1000 MG tablet, Take 1 tablet (1,000 mg total) by mouth 2 (two) times daily., Disp: 14 tablet, Rfl: 0  No current facility-administered medications for this visit.    Facility-Administered Medications Ordered in Other Visits:     alteplase injection 2 mg, 2 mg, Intra-Catheter, PRN, Karoline English, FNP    diphenhydrAMINE injection 50 mg, 50 mg, Intravenous, Once PRN, Karoline English, DONNIEP    EPINEPHrine (EPIPEN) 0.3 mg/0.3 mL pen injection 0.3 mg, 0.3 mg, Intramuscular, Once PRN, Karoline English,  "FNP    fluorouracil (ADRUCIL) 4,000 mg in sodium chloride 0.9% 138 mL chemo infusion, 4,000 mg, Intravenous, over 46 hr, Karoline English, FNP, 4,000 mg at 02/06/24 1440    heparin, porcine (PF) 100 unit/mL injection flush 500 Units, 500 Units, Intravenous, PRN, Karoline English, FNP    hydrocortisone sodium succinate injection 100 mg, 100 mg, Intravenous, Once PRN, Karoline English, FNP    prochlorperazine injection Soln 5 mg, 5 mg, Intravenous, Once PRN, Karoline English L, FNP    sodium chloride 0.9% flush 10 mL, 10 mL, Intravenous, PRN, Karoline English, FNP    Labs: 2/6/24 glu 135 (H)    Anthropometrics    Height:   Ht Readings from Last 1 Encounters:   02/06/24 5' 2" (1.575 m)      Weight:   Wt Readings from Last 5 Encounters:   02/06/24 62.6 kg (138 lb 0.1 oz)   01/23/24 64.2 kg (141 lb 8.6 oz)   01/16/24 64.2 kg (141 lb 8.6 oz)   01/16/24 64.2 kg (141 lb 8.6 oz)   01/04/24 67.1 kg (147 lb 15.9 oz)        Usual Body Weight: 87.4 kg (192 lb)   % Weight Change: -28% past 9-10 months to lowest wt 138 lb    BMI: 25.2 (overweight)    Ideal Weight: 50 kg (110 lb)  % Ideal Weight: 125%    Malnutrition in the context of chronic illness  Degree of Malnutrition: non-severe (moderate) malnutrition  Energy Intake: does not meet criteria  Interpretation of Weight Loss: >10% in 6 months  Body Fat: mild depletion  Area of Body Fat Loss: orbital region , upper arm region - triceps / biceps, and thoracic and lumbar region - ribs, lower back, midaxillary line  Muscle Mass Loss: mild depletion  Area of Muscle Mass Loss: temple region - temporalis muscle, clavicle bone region - pectoralis major, deltoid, trapezius muscles, clavicle and acromion bone region - deltoid muscle, scapular bone region - trapezius, supraspinus, infraspinus muscles, dorsal hand - interosseous musle, patellar region - quadricep muscle, anterior thigh region - quadriceps muscles, and posterior calf region - gastrocnemius muscle  Fluid Accumulation: does not meet " criteria  Edema: no edema present  Reduced  Strength: unable to obtain  A minimum of two characteristics is recommended for diagnosis of either severe or non-severe malnutrition.    Estimated Needs (using CBW 62.6 kg)  1723 kcal/day  [place] [address]. Jeor x 1.1 activity factor x 1.5 stress factor  94 g protein/day 1.5 g/kg CBW  1723 mL fluid/day 1 mL/kcal    Nutrition Diagnosis    PES: Malnutrition related to chronic illness as evidenced by <75% intake est needs >/=1 month, >10% wt loss 6 months, mild muscle/fat wasting. (active)     Nutrition Risk  moderate       Nutrition Intervention    Interventions(treatment strategy):  modified composition of meals/snacks, commercial beverage, prescription medication, and collaboration with other providers      Nutrition Monitoring and Evaluation    Monitor: food and beverage intake, weight change, and electrolyte/renal panel    Follow up in 3-4 weeks.        Trinity Rubin, MS, RD, , LDN

## 2024-02-06 NOTE — PLAN OF CARE
Plan of care reviewed with patient; patient in agreement. C9D1 completed; pt DC to home with chemo pump. Will return 2/8 ~1230 to DC pump/fluids/injection.

## 2024-02-06 NOTE — PROGRESS NOTES
Subjective:       Patient ID: Darby Gary is a 83 y.o. female.    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  This was staged using CT scan.    Current Treatment:   Modified FOLFIRINOX without the 5FU bolus.  This treatment was started on 10/10/2023    Treatment History:  ERCP on 09/13/2023    HPI:  83-year-old female was seen by her nephrologist, blood work revealed elevated liver enzymes and bilirubin, she was instructed to present to the emergency department on 09/11/2023.  She presented to [facility] on 09/11/2023.  CMP in the emergency department revealed an ALT of 233, AST of 263, alkaline phosphatase of 1113, and a bilirubin of 2.3.  Hepatitis panel was done and was negative, lipase was also within normal limits.  A CEA was normal at 1.96, CA 19 9 was extremely elevated at 2461.89.  The patient underwent a CT scan of the abdomen and pelvis with contrast on 09/11/2023 that revealed suspected obstructive pancreatic head mass measuring 2.2 cm with moderate dilation of the biliary ductal system and severe dilation of the pancreatic duct.  There was 1-2 possible regional enlarged lymph nodes in the periportal region.  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  A biliary sphincterotomy was performed and 1 uncovered metal biliary stent was placed.  Brushings were obtained, cytology revealed adenocarcinoma.  She was discharged home in stable condition on 09/14/2023.  The patient was referred to Medical Oncology.  I saw the patient on 09/25/2023.  She stated that she was feeling okay.  She had no major issues to discuss.  She was seen by Dr. Chucky Olguin on 10/03/2023, underwent diagnostic laparoscopy and port placement on 10/10/2023.  No metastatic  disease seen on diagnostic laparoscopy.  Started modified FOLFIRINOX on 10/10/2023.    Interval History:   Patient is here today for a treatment visit.  She started modified FOLFIRINOX on 10/10/2023. She does have some fatigue but overall tolerates treatment very well.  Denies any recent issues with abdominal pain, nausea, diarrhea.  She also denies any issues with neuropathy.    Past Medical History:   Diagnosis Date    Arthritis     Diabetes mellitus     Hyperlipidemia     Hypertension     Osteoporosis     Pancreatic cancer       Past Surgical History:   Procedure Laterality Date    COLONOSCOPY      DIAGNOSTIC LAPAROSCOPY N/A 10/9/2023    Procedure: LAPAROSCOPY, DIAGNOSTIC  Req 12/12:30;  Surgeon: Chucky Olguin MD;  Location: [facility] OR;  Service: General;  Laterality: N/A;    ERCP N/A 9/13/2023    Procedure: ERCP (ENDOSCOPIC RETROGRADE CHOLANGIOPANCREATOGRAPHY)/ cytology brushing;  Surgeon: Erika Ulloa MD;  Location: [facility] ENDOSCOPY;  Service: Gastroenterology;  Laterality: N/A;    ERCP W/ SPHICTEROTOMY  9/13/2023    Procedure: ERCP, WITH SPHINCTEROTOMY;  Surgeon: Erika Ulloa MD;  Location: [facility] ENDOSCOPY;  Service: Gastroenterology;;    ERCP, WITH STENT INSERTION  9/13/2023    Procedure: ERCP, WITH STENT INSERTION;  Surgeon: Erika Ulloa MD;  Location: [facility] ENDOSCOPY;  Service: Gastroenterology;;    ESOPHAGOGASTRODUODENOSCOPY N/A 9/13/2023    Procedure: EGD (ESOPHAGOGASTRODUODENOSCOPY);  Surgeon: Erika Ulloa MD;  Location: [facility] ENDOSCOPY;  Service: Gastroenterology;  Laterality: N/A;    PLACEMENT, MEDIPORT Right 10/9/2023    Procedure: PLACEMENT, MEDIPORT;  Surgeon: Chucky Olguin MD;  Location: [facility] OR;  Service: General;  Laterality: Right;     Social History     Socioeconomic History    Marital status:    Tobacco Use    Smoking status: Former     Types: Cigarettes    Smokeless tobacco: Never    Tobacco comments:     Quit over 25 years ago.    Substance and Sexual  Activity    Alcohol use: Never    Drug use: Never    Sexual activity: Not Currently     Social Determinants of Health     Financial Resource Strain: High Risk (9/13/2023)    Overall Financial Resource Strain (CARDIA)     Difficulty of Paying Living Expenses: Hard   Food Insecurity: Food Insecurity Present (9/13/2023)    Hunger Vital Sign     Worried About Running Out of Food in the Last Year: Sometimes true     Ran Out of Food in the Last Year: Sometimes true   Transportation Needs: No Transportation Needs (9/13/2023)    PRAPARE - Transportation     Lack of Transportation (Medical): No     Lack of Transportation (Non-Medical): No   Physical Activity: Inactive (9/13/2023)    Exercise Vital Sign     Days of Exercise per Week: 0 days     Minutes of Exercise per Session: 0 min   Stress: No Stress Concern Present (9/13/2023)    [employer] [place] [employer] - Occupational Stress Questionnaire     Feeling of Stress : Only a little   Social Connections: Moderately Isolated (9/13/2023)    Social Connection and Isolation Panel [NHANES]     Frequency of Communication with Friends and Family: More than three times a week     Frequency of Social Gatherings with Friends and Family: More than three times a week     Attends [employer] Services: More than 4 times per year     Active Member of Clubs or Organizations: No     Attends Club or Organization Meetings: Never     Marital Status:    Housing Stability: Low Risk  (9/13/2023)    Housing Stability Vital Sign     Unable to Pay for Housing in the Last Year: No     Number of Places Lived in the Last Year: 1     Unstable Housing in the Last Year: No      Family History   Problem Relation Age of Onset    Leukemia Father     Prostate cancer Brother     Breast cancer Other 48    Breast cancer Maternal Aunt     Breast cancer Maternal Aunt     Breast cancer Maternal Aunt     Cancer Paternal Uncle       Review of patient's allergies indicates:  No Known Allergies   Review  of Systems   Constitutional:  Negative for chills, diaphoresis, fatigue, fever and unexpected weight change.   HENT:  Negative for nasal congestion, mouth sores, sinus pressure/congestion and sore throat.    Eyes:  Negative for pain and visual disturbance.   Respiratory:  Negative for cough, chest tightness and shortness of breath.    Cardiovascular:  Negative for chest pain, palpitations and leg swelling.   Gastrointestinal:  Negative for abdominal distention, abdominal pain, blood in stool, constipation and diarrhea.   Genitourinary:  Negative for dysuria, frequency and hematuria.   Musculoskeletal:  Negative for arthralgias and back pain.   Integumentary:  Negative for rash.   Neurological:  Negative for dizziness, weakness, numbness and headaches.   Hematological:  Negative for adenopathy.   Psychiatric/Behavioral:  Negative for confusion.          Objective:      Physical Exam  Vitals reviewed.   Constitutional:       General: She is not in acute distress.     Appearance: Normal appearance.   HENT:      Head: Normocephalic and atraumatic.      Nose: Nose normal.      Mouth/Throat:      Mouth: Mucous membranes are moist.   Eyes:      Extraocular Movements: Extraocular movements intact.      Conjunctiva/sclera: Conjunctivae normal.   Cardiovascular:      Rate and Rhythm: Normal rate and regular rhythm.      Pulses: Normal pulses.      Heart sounds: Normal heart sounds.   Pulmonary:      Effort: Pulmonary effort is normal.      Breath sounds: Normal breath sounds.   Abdominal:      General: Bowel sounds are normal.      Palpations: Abdomen is soft.   Musculoskeletal:         General: No swelling. Normal range of motion.      Cervical back: Normal range of motion and neck supple.      Right lower leg: No edema.      Left lower leg: No edema.   Skin:     General: Skin is warm and dry.   Neurological:      General: No focal deficit present.      Mental Status: She is alert and oriented to person, place, and time.  Mental status is at baseline.   Psychiatric:         Mood and Affect: Mood normal.         Behavior: Behavior normal.       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  This was staged using CT scan.        Plan:       Patient has at least stage IIB pancreatic cancer based on CT abdomen/pelvis measurements.     PET/CT scan on 10/05/2023- No FDG avid metastatic disease identified. Focal uptake in the pancreatic head about a CBD stent compatible with patient's primary malignancy.    Patient saw Dr. Chucky Olguin on 10/03/2023, underwent port placement and diagnostic laparoscopy on 10/09/2023.  No metastatic disease seen.  Started modified FOLFIRINOX on 10/10/2023.    She has met with Genetics and results were negative.    Ca 19-9 is trending down and doing so consistently.    proceed with cycle 9    RTC prior to Cycle 10 with labs: CBC, CMP, Mg, Ca 19-9    SAHRA TangC

## 2024-02-08 ENCOUNTER — INFUSION (OUTPATIENT)
Dept: INFUSION THERAPY | Facility: HOSPITAL | Age: 84
End: 2024-02-08
Attending: INTERNAL MEDICINE
Payer: COMMERCIAL

## 2024-02-08 VITALS
RESPIRATION RATE: 18 BRPM | SYSTOLIC BLOOD PRESSURE: 147 MMHG | DIASTOLIC BLOOD PRESSURE: 69 MMHG | HEART RATE: 72 BPM | TEMPERATURE: 98 F | OXYGEN SATURATION: 98 %

## 2024-02-08 DIAGNOSIS — C25.9 PANCREATIC ADENOCARCINOMA: Primary | ICD-10-CM

## 2024-02-08 PROCEDURE — 96360 HYDRATION IV INFUSION INIT: CPT

## 2024-02-08 PROCEDURE — 96372 THER/PROPH/DIAG INJ SC/IM: CPT

## 2024-02-08 PROCEDURE — 63600175 PHARM REV CODE 636 W HCPCS: Performed by: NURSE PRACTITIONER

## 2024-02-08 PROCEDURE — A4216 STERILE WATER/SALINE, 10 ML: HCPCS | Performed by: NURSE PRACTITIONER

## 2024-02-08 PROCEDURE — 25000003 PHARM REV CODE 250: Performed by: NURSE PRACTITIONER

## 2024-02-08 RX ORDER — SODIUM CHLORIDE 0.9 % (FLUSH) 0.9 %
10 SYRINGE (ML) INJECTION
Status: DISCONTINUED | OUTPATIENT
Start: 2024-02-08 | End: 2024-02-08 | Stop reason: HOSPADM

## 2024-02-08 RX ORDER — HEPARIN 100 UNIT/ML
500 SYRINGE INTRAVENOUS
Status: DISCONTINUED | OUTPATIENT
Start: 2024-02-08 | End: 2024-02-08 | Stop reason: HOSPADM

## 2024-02-08 RX ADMIN — SODIUM CHLORIDE 1000 ML: 9 INJECTION, SOLUTION INTRAVENOUS at 11:02

## 2024-02-08 RX ADMIN — Medication 10 ML: at 12:02

## 2024-02-08 RX ADMIN — PEGFILGRASTIM-CBQV 6 MG: 6 INJECTION, SOLUTION SUBCUTANEOUS at 12:02

## 2024-02-08 RX ADMIN — HEPARIN 500 UNITS: 100 SYRINGE at 12:02

## 2024-02-08 NOTE — PLAN OF CARE
Plan of care reviewed with patient; patient in agreement. C9D3 DC CADD/1L NS/Udenyca completed. Appts reviewed and printed for patient.

## 2024-02-20 ENCOUNTER — INFUSION (OUTPATIENT)
Dept: INFUSION THERAPY | Facility: HOSPITAL | Age: 84
End: 2024-02-20
Attending: INTERNAL MEDICINE
Payer: COMMERCIAL

## 2024-02-20 ENCOUNTER — OFFICE VISIT (OUTPATIENT)
Dept: HEMATOLOGY/ONCOLOGY | Facility: CLINIC | Age: 84
End: 2024-02-20
Payer: COMMERCIAL

## 2024-02-20 VITALS
HEIGHT: 62 IN | WEIGHT: 134.25 LBS | SYSTOLIC BLOOD PRESSURE: 137 MMHG | RESPIRATION RATE: 18 BRPM | BODY MASS INDEX: 24.7 KG/M2 | OXYGEN SATURATION: 98 % | HEART RATE: 77 BPM | DIASTOLIC BLOOD PRESSURE: 88 MMHG

## 2024-02-20 DIAGNOSIS — C25.9 PANCREATIC ADENOCARCINOMA: Primary | ICD-10-CM

## 2024-02-20 DIAGNOSIS — Z79.899 ON ANTINEOPLASTIC CHEMOTHERAPY: ICD-10-CM

## 2024-02-20 DIAGNOSIS — G62.0 CHEMOTHERAPY-INDUCED PERIPHERAL NEUROPATHY: ICD-10-CM

## 2024-02-20 DIAGNOSIS — T45.1X5A CHEMOTHERAPY-INDUCED PERIPHERAL NEUROPATHY: ICD-10-CM

## 2024-02-20 DIAGNOSIS — C77.2 MALIGNANT NEOPLASM METASTATIC TO INTRA-ABDOMINAL LYMPH NODE: ICD-10-CM

## 2024-02-20 PROCEDURE — 96416 CHEMO PROLONG INFUSE W/PUMP: CPT

## 2024-02-20 PROCEDURE — 96413 CHEMO IV INFUSION 1 HR: CPT

## 2024-02-20 PROCEDURE — 3075F SYST BP GE 130 - 139MM HG: CPT | Mod: CPTII,S$GLB,, | Performed by: NURSE PRACTITIONER

## 2024-02-20 PROCEDURE — 3079F DIAST BP 80-89 MM HG: CPT | Mod: CPTII,S$GLB,, | Performed by: NURSE PRACTITIONER

## 2024-02-20 PROCEDURE — 99215 OFFICE O/P EST HI 40 MIN: CPT | Mod: S$GLB,,, | Performed by: NURSE PRACTITIONER

## 2024-02-20 PROCEDURE — 99999 PR PBB SHADOW E&M-EST. PATIENT-LVL IV: CPT | Mod: PBBFAC,,, | Performed by: NURSE PRACTITIONER

## 2024-02-20 PROCEDURE — 25000003 PHARM REV CODE 250: Performed by: NURSE PRACTITIONER

## 2024-02-20 PROCEDURE — 1160F RVW MEDS BY RX/DR IN RCRD: CPT | Mod: CPTII,S$GLB,, | Performed by: NURSE PRACTITIONER

## 2024-02-20 PROCEDURE — 96375 TX/PRO/DX INJ NEW DRUG ADDON: CPT

## 2024-02-20 PROCEDURE — 1101F PT FALLS ASSESS-DOCD LE1/YR: CPT | Mod: CPTII,S$GLB,, | Performed by: NURSE PRACTITIONER

## 2024-02-20 PROCEDURE — 96366 THER/PROPH/DIAG IV INF ADDON: CPT

## 2024-02-20 PROCEDURE — 96415 CHEMO IV INFUSION ADDL HR: CPT

## 2024-02-20 PROCEDURE — 63600175 PHARM REV CODE 636 W HCPCS: Performed by: NURSE PRACTITIONER

## 2024-02-20 PROCEDURE — 1159F MED LIST DOCD IN RCRD: CPT | Mod: CPTII,S$GLB,, | Performed by: NURSE PRACTITIONER

## 2024-02-20 PROCEDURE — 3288F FALL RISK ASSESSMENT DOCD: CPT | Mod: CPTII,S$GLB,, | Performed by: NURSE PRACTITIONER

## 2024-02-20 RX ORDER — DIPHENHYDRAMINE HYDROCHLORIDE 50 MG/ML
25 INJECTION INTRAMUSCULAR; INTRAVENOUS
Status: CANCELLED
Start: 2024-02-20

## 2024-02-20 RX ORDER — DIPHENHYDRAMINE HYDROCHLORIDE 50 MG/ML
25 INJECTION INTRAMUSCULAR; INTRAVENOUS
Status: COMPLETED | OUTPATIENT
Start: 2024-02-20 | End: 2024-02-20

## 2024-02-20 RX ORDER — SODIUM CHLORIDE 0.9 % (FLUSH) 0.9 %
10 SYRINGE (ML) INJECTION
Status: DISCONTINUED | OUTPATIENT
Start: 2024-02-20 | End: 2024-02-20 | Stop reason: HOSPADM

## 2024-02-20 RX ORDER — PROCHLORPERAZINE EDISYLATE 5 MG/ML
5 INJECTION INTRAMUSCULAR; INTRAVENOUS ONCE AS NEEDED
Status: DISCONTINUED | OUTPATIENT
Start: 2024-02-20 | End: 2024-02-20 | Stop reason: HOSPADM

## 2024-02-20 RX ORDER — HEPARIN 100 UNIT/ML
500 SYRINGE INTRAVENOUS
Status: CANCELLED | OUTPATIENT
Start: 2024-02-22

## 2024-02-20 RX ORDER — ATROPINE SULFATE 0.4 MG/ML
0.4 INJECTION, SOLUTION ENDOTRACHEAL; INTRAMEDULLARY; INTRAMUSCULAR; INTRAVENOUS; SUBCUTANEOUS ONCE AS NEEDED
Status: CANCELLED | OUTPATIENT
Start: 2024-02-20

## 2024-02-20 RX ORDER — SODIUM CHLORIDE 0.9 % (FLUSH) 0.9 %
10 SYRINGE (ML) INJECTION
Status: CANCELLED | OUTPATIENT
Start: 2024-02-20

## 2024-02-20 RX ORDER — FAMOTIDINE 10 MG/ML
20 INJECTION INTRAVENOUS
Status: CANCELLED
Start: 2024-02-20

## 2024-02-20 RX ORDER — ATROPINE SULFATE 0.4 MG/ML
0.4 INJECTION, SOLUTION ENDOTRACHEAL; INTRAMEDULLARY; INTRAMUSCULAR; INTRAVENOUS; SUBCUTANEOUS ONCE AS NEEDED
Status: COMPLETED | OUTPATIENT
Start: 2024-02-20 | End: 2024-02-20

## 2024-02-20 RX ORDER — HEPARIN 100 UNIT/ML
500 SYRINGE INTRAVENOUS
Status: DISCONTINUED | OUTPATIENT
Start: 2024-02-20 | End: 2024-02-20 | Stop reason: HOSPADM

## 2024-02-20 RX ORDER — EPINEPHRINE 0.3 MG/.3ML
0.3 INJECTION SUBCUTANEOUS ONCE AS NEEDED
Status: DISCONTINUED | OUTPATIENT
Start: 2024-02-20 | End: 2024-02-20 | Stop reason: HOSPADM

## 2024-02-20 RX ORDER — EPINEPHRINE 0.3 MG/.3ML
0.3 INJECTION SUBCUTANEOUS ONCE AS NEEDED
Status: CANCELLED | OUTPATIENT
Start: 2024-02-20

## 2024-02-20 RX ORDER — PROCHLORPERAZINE EDISYLATE 5 MG/ML
5 INJECTION INTRAMUSCULAR; INTRAVENOUS ONCE AS NEEDED
Status: CANCELLED | OUTPATIENT
Start: 2024-02-20

## 2024-02-20 RX ORDER — SODIUM CHLORIDE 0.9 % (FLUSH) 0.9 %
10 SYRINGE (ML) INJECTION
Status: CANCELLED | OUTPATIENT
Start: 2024-02-22

## 2024-02-20 RX ORDER — FAMOTIDINE 10 MG/ML
20 INJECTION INTRAVENOUS
Status: COMPLETED | OUTPATIENT
Start: 2024-02-20 | End: 2024-02-20

## 2024-02-20 RX ORDER — DIPHENHYDRAMINE HYDROCHLORIDE 50 MG/ML
50 INJECTION INTRAMUSCULAR; INTRAVENOUS ONCE AS NEEDED
Status: DISCONTINUED | OUTPATIENT
Start: 2024-02-20 | End: 2024-02-20 | Stop reason: HOSPADM

## 2024-02-20 RX ORDER — DIPHENHYDRAMINE HYDROCHLORIDE 50 MG/ML
50 INJECTION INTRAMUSCULAR; INTRAVENOUS ONCE AS NEEDED
Status: CANCELLED | OUTPATIENT
Start: 2024-02-20

## 2024-02-20 RX ORDER — HEPARIN 100 UNIT/ML
500 SYRINGE INTRAVENOUS
Status: CANCELLED | OUTPATIENT
Start: 2024-02-20

## 2024-02-20 RX ORDER — FLUOROURACIL 50 MG/ML
2400 INJECTION, SOLUTION INTRAVENOUS
Status: CANCELLED | OUTPATIENT
Start: 2024-02-20

## 2024-02-20 RX ADMIN — ATROPINE SULFATE 0.4 MG: 0.4 INJECTION, SOLUTION INTRAVENOUS at 01:02

## 2024-02-20 RX ADMIN — DEXAMETHASONE SODIUM PHOSPHATE 0.25 MG: 4 INJECTION, SOLUTION INTRA-ARTICULAR; INTRALESIONAL; INTRAMUSCULAR; INTRAVENOUS; SOFT TISSUE at 11:02

## 2024-02-20 RX ADMIN — DEXTROSE MONOHYDRATE: 50 INJECTION, SOLUTION INTRAVENOUS at 11:02

## 2024-02-20 RX ADMIN — OXALIPLATIN 139 MG: 5 INJECTION, SOLUTION INTRAVENOUS at 11:02

## 2024-02-20 RX ADMIN — FAMOTIDINE 20 MG: 10 INJECTION INTRAVENOUS at 11:02

## 2024-02-20 RX ADMIN — LEUCOVORIN CALCIUM 650 MG: 200 INJECTION, POWDER, LYOPHILIZED, FOR SOLUTION INTRAMUSCULAR; INTRAVENOUS at 01:02

## 2024-02-20 RX ADMIN — DIPHENHYDRAMINE HYDROCHLORIDE 25 MG: 50 INJECTION INTRAMUSCULAR; INTRAVENOUS at 11:02

## 2024-02-20 RX ADMIN — IRINOTECAN HYDROCHLORIDE 244 MG: 20 INJECTION, SOLUTION INTRAVENOUS at 01:02

## 2024-02-20 RX ADMIN — FLUOROURACIL 3900 MG: 50 INJECTION, SOLUTION INTRAVENOUS at 03:02

## 2024-02-20 NOTE — PLAN OF CARE
Problem: Adult Inpatient Plan of Care  Goal: Plan of Care Review  Outcome: Met  Flowsheets (Taken 2/20/2024 1533)  Plan of Care Reviewed With: patient  Goal: Absence of Hospital-Acquired Illness or Injury  Outcome: Met  Intervention: Identify and Manage Fall Risk  Flowsheets (Taken 2/20/2024 1533)  Safety Promotion/Fall Prevention:   assistive device/personal item within reach   Fall Risk reviewed with patient/family   in recliner, wheels locked     Pt tolerated infusion well. Next appt reviewed; pt denied questions or further needs at the time of discharge.

## 2024-02-20 NOTE — PROGRESS NOTES
Subjective:       Patient ID: Darby Gary is a 83 y.o. female.    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  This was staged using CT scan.    Current Treatment:   Modified FOLFIRINOX without the 5FU bolus.  This treatment was started on 10/10/2023    Treatment History:  ERCP on 09/13/2023    HPI:  83-year-old female was seen by her nephrologist, blood work revealed elevated liver enzymes and bilirubin, she was instructed to present to the emergency department on 09/11/2023.  She presented to [facility] on 09/11/2023.  CMP in the emergency department revealed an ALT of 233, AST of 263, alkaline phosphatase of 1113, and a bilirubin of 2.3.  Hepatitis panel was done and was negative, lipase was also within normal limits.  A CEA was normal at 1.96, CA 19 9 was extremely elevated at 2461.89.  The patient underwent a CT scan of the abdomen and pelvis with contrast on 09/11/2023 that revealed suspected obstructive pancreatic head mass measuring 2.2 cm with moderate dilation of the biliary ductal system and severe dilation of the pancreatic duct.  There was 1-2 possible regional enlarged lymph nodes in the periportal region.  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  A biliary sphincterotomy was performed and 1 uncovered metal biliary stent was placed.  Brushings were obtained, cytology revealed adenocarcinoma.  She was discharged home in stable condition on 09/14/2023.  The patient was referred to Medical Oncology.  I saw the patient on 09/25/2023.  She stated that she was feeling okay.  She had no major issues to discuss.  She was seen by Dr. Chucky Olguin on 10/03/2023, underwent diagnostic laparoscopy and port placement on 10/10/2023.  No metastatic  disease seen on diagnostic laparoscopy.  Started modified FOLFIRINOX on 10/10/2023.    Interval History:   Patient is here today for a treatment visit.  She started modified FOLFIRINOX on 10/10/2023. She does have some fatigue but overall tolerates treatment very well.  She states that she is starting to notice some tingling in her lower extremities, none in her hands.  She describes this is intermittent.  Denies any recent issues with abdominal pain, nausea, diarrhea.       Past Medical History:   Diagnosis Date    Arthritis     Diabetes mellitus     Hyperlipidemia     Hypertension     Osteoporosis     Pancreatic cancer       Past Surgical History:   Procedure Laterality Date    COLONOSCOPY      DIAGNOSTIC LAPAROSCOPY N/A 10/9/2023    Procedure: LAPAROSCOPY, DIAGNOSTIC  Req 12/12:30;  Surgeon: Chucky Olguin MD;  Location: [facility] OR;  Service: General;  Laterality: N/A;    ERCP N/A 9/13/2023    Procedure: ERCP (ENDOSCOPIC RETROGRADE CHOLANGIOPANCREATOGRAPHY)/ cytology brushing;  Surgeon: Erika Ulloa MD;  Location: [facility] ENDOSCOPY;  Service: Gastroenterology;  Laterality: N/A;    ERCP W/ SPHICTEROTOMY  9/13/2023    Procedure: ERCP, WITH SPHINCTEROTOMY;  Surgeon: Erika Ulloa MD;  Location: [facility] ENDOSCOPY;  Service: Gastroenterology;;    ERCP, WITH STENT INSERTION  9/13/2023    Procedure: ERCP, WITH STENT INSERTION;  Surgeon: Erika Ulloa MD;  Location: [facility] ENDOSCOPY;  Service: Gastroenterology;;    ESOPHAGOGASTRODUODENOSCOPY N/A 9/13/2023    Procedure: EGD (ESOPHAGOGASTRODUODENOSCOPY);  Surgeon: Erika Ulloa MD;  Location: [facility] ENDOSCOPY;  Service: Gastroenterology;  Laterality: N/A;    PLACEMENT, MEDIPORT Right 10/9/2023    Procedure: PLACEMENT, MEDIPORT;  Surgeon: Chucky Olguin MD;  Location: [facility] OR;  Service: General;  Laterality: Right;     Social History     Socioeconomic History    Marital status:    Tobacco Use    Smoking status: Former     Types: Cigarettes     Smokeless tobacco: Never    Tobacco comments:     Quit over 25 years ago.    Substance and Sexual Activity    Alcohol use: Never    Drug use: Never    Sexual activity: Not Currently     Social Determinants of Health     Financial Resource Strain: High Risk (9/13/2023)    Overall Financial Resource Strain (CARDIA)     Difficulty of Paying Living Expenses: Hard   Food Insecurity: Food Insecurity Present (9/13/2023)    Hunger Vital Sign     Worried About Running Out of Food in the Last Year: Sometimes true     Ran Out of Food in the Last Year: Sometimes true   Transportation Needs: No Transportation Needs (9/13/2023)    PRAPARE - Transportation     Lack of Transportation (Medical): No     Lack of Transportation (Non-Medical): No   Physical Activity: Inactive (9/13/2023)    Exercise Vital Sign     Days of Exercise per Week: 0 days     Minutes of Exercise per Session: 0 min   Stress: No Stress Concern Present (9/13/2023)    [employer] [place] [place] [employer] - Occupational Stress Questionnaire     Feeling of Stress : Only a little   Social Connections: Moderately Isolated (9/13/2023)    Social Connection and Isolation Panel [NHANES]     Frequency of Communication with Friends and Family: More than three times a week     Frequency of Social Gatherings with Friends and Family: More than three times a week     Attends Faith Services: More than 4 times per year     Active Member of Clubs or Organizations: No     Attends Club or Organization Meetings: Never     Marital Status:    Housing Stability: Low Risk  (9/13/2023)    Housing Stability Vital Sign     Unable to Pay for Housing in the Last Year: No     Number of Places Lived in the Last Year: 1     Unstable Housing in the Last Year: No      Family History   Problem Relation Age of Onset    Leukemia Father     Prostate cancer Brother     Breast cancer Other 48    Breast cancer Maternal Aunt     Breast cancer Maternal Aunt     Breast cancer Maternal Aunt      Cancer Paternal Uncle       Review of patient's allergies indicates:  No Known Allergies   Review of Systems   Constitutional:  Negative for chills, diaphoresis, fatigue, fever and unexpected weight change.   HENT:  Negative for nasal congestion, mouth sores, sinus pressure/congestion and sore throat.    Eyes:  Negative for pain and visual disturbance.   Respiratory:  Negative for cough, chest tightness and shortness of breath.    Cardiovascular:  Negative for chest pain, palpitations and leg swelling.   Gastrointestinal:  Negative for abdominal distention, abdominal pain, blood in stool, constipation and diarrhea.   Genitourinary:  Negative for dysuria, frequency and hematuria.   Musculoskeletal:  Negative for arthralgias and back pain.   Integumentary:  Negative for rash.   Neurological:  Negative for dizziness, weakness, numbness and headaches.   Hematological:  Negative for adenopathy.   Psychiatric/Behavioral:  Negative for confusion.          Objective:      Physical Exam  Vitals reviewed.   Constitutional:       General: She is not in acute distress.     Appearance: Normal appearance.   HENT:      Head: Normocephalic and atraumatic.      Nose: Nose normal.      Mouth/Throat:      Mouth: Mucous membranes are moist.   Eyes:      Extraocular Movements: Extraocular movements intact.      Conjunctiva/sclera: Conjunctivae normal.   Cardiovascular:      Rate and Rhythm: Normal rate and regular rhythm.      Pulses: Normal pulses.      Heart sounds: Normal heart sounds.   Pulmonary:      Effort: Pulmonary effort is normal.      Breath sounds: Normal breath sounds.   Musculoskeletal:         General: No swelling. Normal range of motion.      Cervical back: Normal range of motion and neck supple.      Right lower leg: No edema.      Left lower leg: No edema.   Skin:     General: Skin is warm and dry.   Neurological:      General: No focal deficit present.      Mental Status: She is alert and oriented to person, place,  and time. Mental status is at baseline.   Psychiatric:         Mood and Affect: Mood normal.         Behavior: Behavior normal.         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  This was staged using CT scan.        Plan:       Patient has at least stage IIB pancreatic cancer based on CT abdomen/pelvis measurements.     PET/CT scan on 10/05/2023- No FDG avid metastatic disease identified. Focal uptake in the pancreatic head about a CBD stent compatible with patient's primary malignancy.    Patient saw Dr. Chucky Olguin on 10/03/2023, underwent port placement and diagnostic laparoscopy on 10/09/2023.  No metastatic disease seen.  Started modified FOLFIRINOX on 10/10/2023.    She has met with Genetics and results were negative.    Ca 19-9 is trending down and doing so consistently.    proceed with cycle 10    RTC prior to Cycle 11 with labs: CBC, CMP, Mg, Ca 19-9    DONNIE TangP-C

## 2024-02-22 ENCOUNTER — INFUSION (OUTPATIENT)
Dept: INFUSION THERAPY | Facility: HOSPITAL | Age: 84
End: 2024-02-22
Attending: INTERNAL MEDICINE
Payer: COMMERCIAL

## 2024-02-22 VITALS
SYSTOLIC BLOOD PRESSURE: 136 MMHG | DIASTOLIC BLOOD PRESSURE: 73 MMHG | OXYGEN SATURATION: 96 % | TEMPERATURE: 98 F | RESPIRATION RATE: 16 BRPM | HEART RATE: 74 BPM

## 2024-02-22 DIAGNOSIS — C25.9 PANCREATIC ADENOCARCINOMA: Primary | ICD-10-CM

## 2024-02-22 PROCEDURE — 25000003 PHARM REV CODE 250: Performed by: NURSE PRACTITIONER

## 2024-02-22 PROCEDURE — 63600175 PHARM REV CODE 636 W HCPCS: Performed by: NURSE PRACTITIONER

## 2024-02-22 PROCEDURE — 96360 HYDRATION IV INFUSION INIT: CPT

## 2024-02-22 PROCEDURE — 96372 THER/PROPH/DIAG INJ SC/IM: CPT | Mod: 59

## 2024-02-22 RX ORDER — HEPARIN 100 UNIT/ML
500 SYRINGE INTRAVENOUS
Status: DISCONTINUED | OUTPATIENT
Start: 2024-02-22 | End: 2024-02-22 | Stop reason: HOSPADM

## 2024-02-22 RX ORDER — SODIUM CHLORIDE 0.9 % (FLUSH) 0.9 %
10 SYRINGE (ML) INJECTION
Status: DISCONTINUED | OUTPATIENT
Start: 2024-02-22 | End: 2024-02-22 | Stop reason: HOSPADM

## 2024-02-22 RX ADMIN — SODIUM CHLORIDE 1000 ML: 9 INJECTION, SOLUTION INTRAVENOUS at 11:02

## 2024-02-22 RX ADMIN — PEGFILGRASTIM-CBQV 6 MG: 6 INJECTION, SOLUTION SUBCUTANEOUS at 11:02

## 2024-02-22 RX ADMIN — HEPARIN 500 UNITS: 100 SYRINGE at 12:02

## 2024-02-22 NOTE — PLAN OF CARE
Problem: Adult Inpatient Plan of Care  Goal: Plan of Care Review  Outcome: Met  Flowsheets (Taken 2/22/2024 1246)  Plan of Care Reviewed With: patient  Goal: Absence of Hospital-Acquired Illness or Injury  Outcome: Met  Intervention: Identify and Manage Fall Risk  Flowsheets (Taken 2/22/2024 1246)  Safety Promotion/Fall Prevention:   assistive device/personal item within reach   Fall Risk reviewed with patient/family   in recliner, wheels locked     Pt tolerated treatment well. Next appt reviewed; pt denied questions or further needs at the time of discharge.

## 2024-03-05 ENCOUNTER — OFFICE VISIT (OUTPATIENT)
Dept: HEMATOLOGY/ONCOLOGY | Facility: CLINIC | Age: 84
End: 2024-03-05
Payer: COMMERCIAL

## 2024-03-05 ENCOUNTER — INFUSION (OUTPATIENT)
Dept: INFUSION THERAPY | Facility: HOSPITAL | Age: 84
End: 2024-03-05
Attending: INTERNAL MEDICINE
Payer: COMMERCIAL

## 2024-03-05 ENCOUNTER — PATIENT MESSAGE (OUTPATIENT)
Dept: HEMATOLOGY/ONCOLOGY | Facility: CLINIC | Age: 84
End: 2024-03-05

## 2024-03-05 VITALS
DIASTOLIC BLOOD PRESSURE: 89 MMHG | OXYGEN SATURATION: 99 % | RESPIRATION RATE: 18 BRPM | WEIGHT: 134.13 LBS | BODY MASS INDEX: 24.68 KG/M2 | SYSTOLIC BLOOD PRESSURE: 152 MMHG | HEART RATE: 76 BPM | HEIGHT: 62 IN

## 2024-03-05 DIAGNOSIS — C25.9 PANCREATIC ADENOCARCINOMA: Primary | ICD-10-CM

## 2024-03-05 DIAGNOSIS — Z79.899 ON ANTINEOPLASTIC CHEMOTHERAPY: ICD-10-CM

## 2024-03-05 DIAGNOSIS — C77.2 MALIGNANT NEOPLASM METASTATIC TO INTRA-ABDOMINAL LYMPH NODE: ICD-10-CM

## 2024-03-05 DIAGNOSIS — G62.0 CHEMOTHERAPY-INDUCED PERIPHERAL NEUROPATHY: ICD-10-CM

## 2024-03-05 DIAGNOSIS — T45.1X5A CHEMOTHERAPY-INDUCED PERIPHERAL NEUROPATHY: ICD-10-CM

## 2024-03-05 PROCEDURE — 96415 CHEMO IV INFUSION ADDL HR: CPT

## 2024-03-05 PROCEDURE — 96367 TX/PROPH/DG ADDL SEQ IV INF: CPT

## 2024-03-05 PROCEDURE — 25000003 PHARM REV CODE 250: Performed by: NURSE PRACTITIONER

## 2024-03-05 PROCEDURE — 63600175 PHARM REV CODE 636 W HCPCS: Performed by: NURSE PRACTITIONER

## 2024-03-05 PROCEDURE — 3077F SYST BP >= 140 MM HG: CPT | Mod: CPTII,S$GLB,, | Performed by: NURSE PRACTITIONER

## 2024-03-05 PROCEDURE — 99215 OFFICE O/P EST HI 40 MIN: CPT | Mod: S$GLB,,, | Performed by: NURSE PRACTITIONER

## 2024-03-05 PROCEDURE — 96413 CHEMO IV INFUSION 1 HR: CPT

## 2024-03-05 PROCEDURE — 3079F DIAST BP 80-89 MM HG: CPT | Mod: CPTII,S$GLB,, | Performed by: NURSE PRACTITIONER

## 2024-03-05 PROCEDURE — 1160F RVW MEDS BY RX/DR IN RCRD: CPT | Mod: CPTII,S$GLB,, | Performed by: NURSE PRACTITIONER

## 2024-03-05 PROCEDURE — 96416 CHEMO PROLONG INFUSE W/PUMP: CPT

## 2024-03-05 PROCEDURE — 3288F FALL RISK ASSESSMENT DOCD: CPT | Mod: CPTII,S$GLB,, | Performed by: NURSE PRACTITIONER

## 2024-03-05 PROCEDURE — 99999 PR PBB SHADOW E&M-EST. PATIENT-LVL IV: CPT | Mod: PBBFAC,,, | Performed by: NURSE PRACTITIONER

## 2024-03-05 PROCEDURE — 96368 THER/DIAG CONCURRENT INF: CPT

## 2024-03-05 PROCEDURE — 1159F MED LIST DOCD IN RCRD: CPT | Mod: CPTII,S$GLB,, | Performed by: NURSE PRACTITIONER

## 2024-03-05 PROCEDURE — 1101F PT FALLS ASSESS-DOCD LE1/YR: CPT | Mod: CPTII,S$GLB,, | Performed by: NURSE PRACTITIONER

## 2024-03-05 PROCEDURE — 96375 TX/PRO/DX INJ NEW DRUG ADDON: CPT

## 2024-03-05 RX ORDER — FAMOTIDINE 10 MG/ML
20 INJECTION INTRAVENOUS
Status: CANCELLED
Start: 2024-03-05

## 2024-03-05 RX ORDER — DIPHENHYDRAMINE HYDROCHLORIDE 50 MG/ML
50 INJECTION INTRAMUSCULAR; INTRAVENOUS ONCE AS NEEDED
Status: DISCONTINUED | OUTPATIENT
Start: 2024-03-05 | End: 2024-03-05 | Stop reason: HOSPADM

## 2024-03-05 RX ORDER — ATROPINE SULFATE 0.4 MG/ML
0.4 INJECTION, SOLUTION ENDOTRACHEAL; INTRAMEDULLARY; INTRAMUSCULAR; INTRAVENOUS; SUBCUTANEOUS ONCE AS NEEDED
Status: CANCELLED | OUTPATIENT
Start: 2024-03-05

## 2024-03-05 RX ORDER — SODIUM CHLORIDE 0.9 % (FLUSH) 0.9 %
10 SYRINGE (ML) INJECTION
Status: CANCELLED | OUTPATIENT
Start: 2024-03-07

## 2024-03-05 RX ORDER — DIPHENHYDRAMINE HYDROCHLORIDE 50 MG/ML
25 INJECTION INTRAMUSCULAR; INTRAVENOUS
Status: COMPLETED | OUTPATIENT
Start: 2024-03-05 | End: 2024-03-05

## 2024-03-05 RX ORDER — PROCHLORPERAZINE EDISYLATE 5 MG/ML
5 INJECTION INTRAMUSCULAR; INTRAVENOUS ONCE AS NEEDED
Status: CANCELLED | OUTPATIENT
Start: 2024-03-05

## 2024-03-05 RX ORDER — HEPARIN 100 UNIT/ML
500 SYRINGE INTRAVENOUS
Status: DISCONTINUED | OUTPATIENT
Start: 2024-03-05 | End: 2024-03-05 | Stop reason: HOSPADM

## 2024-03-05 RX ORDER — EPINEPHRINE 0.3 MG/.3ML
0.3 INJECTION SUBCUTANEOUS ONCE AS NEEDED
Status: DISCONTINUED | OUTPATIENT
Start: 2024-03-05 | End: 2024-03-05 | Stop reason: HOSPADM

## 2024-03-05 RX ORDER — ATROPINE SULFATE 0.4 MG/ML
0.4 INJECTION, SOLUTION ENDOTRACHEAL; INTRAMEDULLARY; INTRAMUSCULAR; INTRAVENOUS; SUBCUTANEOUS ONCE AS NEEDED
Status: DISCONTINUED | OUTPATIENT
Start: 2024-03-05 | End: 2024-03-05 | Stop reason: HOSPADM

## 2024-03-05 RX ORDER — DIPHENHYDRAMINE HYDROCHLORIDE 50 MG/ML
25 INJECTION INTRAMUSCULAR; INTRAVENOUS
Status: CANCELLED
Start: 2024-03-05

## 2024-03-05 RX ORDER — HEPARIN 100 UNIT/ML
500 SYRINGE INTRAVENOUS
Status: CANCELLED | OUTPATIENT
Start: 2024-03-07

## 2024-03-05 RX ORDER — HEPARIN 100 UNIT/ML
500 SYRINGE INTRAVENOUS
Status: CANCELLED | OUTPATIENT
Start: 2024-03-05

## 2024-03-05 RX ORDER — SODIUM CHLORIDE 0.9 % (FLUSH) 0.9 %
10 SYRINGE (ML) INJECTION
Status: CANCELLED | OUTPATIENT
Start: 2024-03-05

## 2024-03-05 RX ORDER — FAMOTIDINE 10 MG/ML
20 INJECTION INTRAVENOUS
Status: COMPLETED | OUTPATIENT
Start: 2024-03-05 | End: 2024-03-05

## 2024-03-05 RX ORDER — SODIUM CHLORIDE 0.9 % (FLUSH) 0.9 %
10 SYRINGE (ML) INJECTION
Status: DISCONTINUED | OUTPATIENT
Start: 2024-03-05 | End: 2024-03-05 | Stop reason: HOSPADM

## 2024-03-05 RX ORDER — EPINEPHRINE 0.3 MG/.3ML
0.3 INJECTION SUBCUTANEOUS ONCE AS NEEDED
Status: CANCELLED | OUTPATIENT
Start: 2024-03-05

## 2024-03-05 RX ORDER — DIPHENHYDRAMINE HYDROCHLORIDE 50 MG/ML
50 INJECTION INTRAMUSCULAR; INTRAVENOUS ONCE AS NEEDED
Status: CANCELLED | OUTPATIENT
Start: 2024-03-05

## 2024-03-05 RX ORDER — PROCHLORPERAZINE EDISYLATE 5 MG/ML
5 INJECTION INTRAMUSCULAR; INTRAVENOUS ONCE AS NEEDED
Status: DISCONTINUED | OUTPATIENT
Start: 2024-03-05 | End: 2024-03-05 | Stop reason: HOSPADM

## 2024-03-05 RX ORDER — FLUOROURACIL 50 MG/ML
2400 INJECTION, SOLUTION INTRAVENOUS
Status: CANCELLED | OUTPATIENT
Start: 2024-03-05

## 2024-03-05 RX ADMIN — IRINOTECAN HYDROCHLORIDE 244 MG: 20 INJECTION, SOLUTION INTRAVENOUS at 10:03

## 2024-03-05 RX ADMIN — FLUOROURACIL 3900 MG: 50 INJECTION, SOLUTION INTRAVENOUS at 11:03

## 2024-03-05 RX ADMIN — LEUCOVORIN CALCIUM 650 MG: 200 INJECTION, POWDER, LYOPHILIZED, FOR SOLUTION INTRAMUSCULAR; INTRAVENOUS at 10:03

## 2024-03-05 RX ADMIN — SODIUM CHLORIDE: 9 INJECTION, SOLUTION INTRAVENOUS at 09:03

## 2024-03-05 RX ADMIN — PALONOSETRON HYDROCHLORIDE 0.25 MG: 0.25 INJECTION INTRAVENOUS at 09:03

## 2024-03-05 RX ADMIN — DIPHENHYDRAMINE HYDROCHLORIDE 25 MG: 50 INJECTION INTRAMUSCULAR; INTRAVENOUS at 09:03

## 2024-03-05 RX ADMIN — FAMOTIDINE 20 MG: 10 INJECTION INTRAVENOUS at 09:03

## 2024-03-05 NOTE — PROGRESS NOTES
Subjective:       Patient ID: Darby Gary is a 83 y.o. female.    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  This was staged using CT scan.    Current Treatment:   Modified FOLFIRINOX without the 5FU bolus.  This treatment was started on 10/10/2023    Treatment History:  ERCP on 09/13/2023    HPI:  83-year-old female was seen by her nephrologist, blood work revealed elevated liver enzymes and bilirubin, she was instructed to present to the emergency department on 09/11/2023.  She presented to [facility] on 09/11/2023.  CMP in the emergency department revealed an ALT of 233, AST of 263, alkaline phosphatase of 1113, and a bilirubin of 2.3.  Hepatitis panel was done and was negative, lipase was also within normal limits.  A CEA was normal at 1.96, CA 19 9 was extremely elevated at 2461.89.  The patient underwent a CT scan of the abdomen and pelvis with contrast on 09/11/2023 that revealed suspected obstructive pancreatic head mass measuring 2.2 cm with moderate dilation of the biliary ductal system and severe dilation of the pancreatic duct.  There was 1-2 possible regional enlarged lymph nodes in the periportal region.  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  A biliary sphincterotomy was performed and 1 uncovered metal biliary stent was placed.  Brushings were obtained, cytology revealed adenocarcinoma.  She was discharged home in stable condition on 09/14/2023.  The patient was referred to Medical Oncology.  I saw the patient on 09/25/2023.  She stated that she was feeling okay.  She had no major issues to discuss.  She was seen by Dr. Chucky Olguin on 10/03/2023, underwent diagnostic laparoscopy and port placement on 10/10/2023.  No  metastatic disease seen on diagnostic laparoscopy.  Started modified FOLFIRINOX on 10/10/2023.    Interval History:   Patient is here today for a treatment visit.  She started modified FOLFIRINOX on 10/10/2023 and is due for cycle 11 today.  She has been tolerating treatment well and feels well overall.  She denies any pain, bowel difficulties, nausea.  Her main complaint today is numbness and tingling mainly in her legs and feet.  It does remain intermittent but is bothersome..     Past Medical History:   Diagnosis Date    Arthritis     Diabetes mellitus     Hyperlipidemia     Hypertension     Osteoporosis     Pancreatic cancer       Past Surgical History:   Procedure Laterality Date    COLONOSCOPY      DIAGNOSTIC LAPAROSCOPY N/A 10/9/2023    Procedure: LAPAROSCOPY, DIAGNOSTIC  Req 12/12:30;  Surgeon: Chucky Olguin MD;  Location: [facility] OR;  Service: General;  Laterality: N/A;    ERCP N/A 9/13/2023    Procedure: ERCP (ENDOSCOPIC RETROGRADE CHOLANGIOPANCREATOGRAPHY)/ cytology brushing;  Surgeon: Erika Ulloa MD;  Location: [facility] ENDOSCOPY;  Service: Gastroenterology;  Laterality: N/A;    ERCP W/ SPHICTEROTOMY  9/13/2023    Procedure: ERCP, WITH SPHINCTEROTOMY;  Surgeon: Erika Ulloa MD;  Location: [facility] ENDOSCOPY;  Service: Gastroenterology;;    ERCP, WITH STENT INSERTION  9/13/2023    Procedure: ERCP, WITH STENT INSERTION;  Surgeon: Erika Ulloa MD;  Location: [facility] ENDOSCOPY;  Service: Gastroenterology;;    ESOPHAGOGASTRODUODENOSCOPY N/A 9/13/2023    Procedure: EGD (ESOPHAGOGASTRODUODENOSCOPY);  Surgeon: Erika Ulloa MD;  Location: [facility] ENDOSCOPY;  Service: Gastroenterology;  Laterality: N/A;    PLACEMENT, MEDIPORT Right 10/9/2023    Procedure: PLACEMENT, MEDIPORT;  Surgeon: Chucky Olguin MD;  Location: [facility] OR;  Service: General;  Laterality: Right;     Social History     Socioeconomic History    Marital status:    Tobacco Use    Smoking status: Former     Types:  Cigarettes    Smokeless tobacco: Never    Tobacco comments:     Quit over 25 years ago.    Substance and Sexual Activity    Alcohol use: Never    Drug use: Never    Sexual activity: Not Currently     Social Determinants of Health     Financial Resource Strain: High Risk (9/13/2023)    Overall Financial Resource Strain (CARDIA)     Difficulty of Paying Living Expenses: Hard   Food Insecurity: Food Insecurity Present (9/13/2023)    Hunger Vital Sign     Worried About Running Out of Food in the Last Year: Sometimes true     Ran Out of Food in the Last Year: Sometimes true   Transportation Needs: No Transportation Needs (9/13/2023)    PRAPARE - Transportation     Lack of Transportation (Medical): No     Lack of Transportation (Non-Medical): No   Physical Activity: Inactive (9/13/2023)    Exercise Vital Sign     Days of Exercise per Week: 0 days     Minutes of Exercise per Session: 0 min   Stress: No Stress Concern Present (9/13/2023)    [place] of Occupational [employer] - Occupational Stress Questionnaire     Feeling of Stress : Only a little   Social Connections: Moderately Isolated (9/13/2023)    Social Connection and Isolation Panel [NHANES]     Frequency of Communication with Friends and Family: More than three times a week     Frequency of Social Gatherings with Friends and Family: More than three times a week     Attends [employer] Services: More than 4 times per year     Active Member of Clubs or Organizations: No     Attends Club or Organization Meetings: Never     Marital Status:    Housing Stability: Low Risk  (9/13/2023)    Housing Stability Vital Sign     Unable to Pay for Housing in the Last Year: No     Number of Places Lived in the Last Year: 1     Unstable Housing in the Last Year: No      Family History   Problem Relation Age of Onset    Leukemia Father     Prostate cancer Brother     Breast cancer Other 48    Breast cancer Maternal Aunt     Breast cancer Maternal Aunt     Breast cancer  Maternal Aunt     Cancer Paternal Uncle       Review of patient's allergies indicates:  No Known Allergies   Review of Systems   Constitutional:  Negative for chills, diaphoresis, fatigue, fever and unexpected weight change.   HENT:  Negative for nasal congestion, mouth sores, sinus pressure/congestion and sore throat.    Eyes:  Negative for pain and visual disturbance.   Respiratory:  Negative for cough, chest tightness and shortness of breath.    Cardiovascular:  Negative for chest pain, palpitations and leg swelling.   Gastrointestinal:  Negative for abdominal distention, abdominal pain, blood in stool, constipation and diarrhea.   Genitourinary:  Negative for dysuria, frequency and hematuria.   Musculoskeletal:  Negative for arthralgias and back pain.   Integumentary:  Negative for rash.   Neurological:  Positive for numbness. Negative for dizziness, weakness and headaches.   Hematological:  Negative for adenopathy.   Psychiatric/Behavioral:  Negative for confusion.          Objective:      Physical Exam  Vitals reviewed.   Constitutional:       General: She is not in acute distress.     Appearance: Normal appearance.   HENT:      Head: Normocephalic and atraumatic.      Nose: Nose normal.      Mouth/Throat:      Mouth: Mucous membranes are moist.   Eyes:      Extraocular Movements: Extraocular movements intact.      Conjunctiva/sclera: Conjunctivae normal.   Cardiovascular:      Rate and Rhythm: Normal rate and regular rhythm.      Pulses: Normal pulses.      Heart sounds: Normal heart sounds.   Pulmonary:      Effort: Pulmonary effort is normal.      Breath sounds: Normal breath sounds.   Musculoskeletal:         General: No swelling. Normal range of motion.      Cervical back: Normal range of motion and neck supple.      Right lower leg: No edema.      Left lower leg: No edema.   Skin:     General: Skin is warm and dry.   Neurological:      General: No focal deficit present.      Mental Status: She is alert  and oriented to person, place, and time. Mental status is at baseline.   Psychiatric:         Mood and Affect: Mood normal.         Behavior: Behavior normal.         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  This was staged using CT scan.        Plan:       Patient has at least stage IIB pancreatic cancer based on CT abdomen/pelvis measurements.     PET/CT scan on 10/05/2023- No FDG avid metastatic disease identified. Focal uptake in the pancreatic head about a CBD stent compatible with patient's primary malignancy.    Patient saw Dr. Chucky Olguin on 10/03/2023, underwent port placement and diagnostic laparoscopy on 10/09/2023.  No metastatic disease seen.  Started modified FOLFIRINOX on 10/10/2023.    She has met with Genetics and results were negative.    Ca 19-9 is trending down and doing so consistently.    proceed with cycle 11, we will discontinue oxaliplatin starting today and going forward.    RTC prior to Cycle 12 with labs: CBC, CMP, Mg, Ca 19-9    KAR Tang

## 2024-03-05 NOTE — PLAN OF CARE
Cycle 11 Camptosar, Lecovorin given.  Tolerated well.  Discharged home with 5fu in cadd pump.  She will return on Thursday at 10am.  She is aware of plan of care.

## 2024-03-06 DIAGNOSIS — C25.9 PANCREATIC ADENOCARCINOMA: Primary | ICD-10-CM

## 2024-03-07 ENCOUNTER — INFUSION (OUTPATIENT)
Dept: INFUSION THERAPY | Facility: HOSPITAL | Age: 84
End: 2024-03-07
Attending: INTERNAL MEDICINE
Payer: COMMERCIAL

## 2024-03-07 VITALS
HEART RATE: 69 BPM | SYSTOLIC BLOOD PRESSURE: 127 MMHG | OXYGEN SATURATION: 98 % | DIASTOLIC BLOOD PRESSURE: 74 MMHG | WEIGHT: 134 LBS | TEMPERATURE: 98 F | BODY MASS INDEX: 24.51 KG/M2

## 2024-03-07 DIAGNOSIS — C25.9 PANCREATIC ADENOCARCINOMA: Primary | ICD-10-CM

## 2024-03-07 PROCEDURE — 96360 HYDRATION IV INFUSION INIT: CPT

## 2024-03-07 PROCEDURE — 63600175 PHARM REV CODE 636 W HCPCS: Mod: JZ,JG | Performed by: NURSE PRACTITIONER

## 2024-03-07 PROCEDURE — 96372 THER/PROPH/DIAG INJ SC/IM: CPT | Mod: 59

## 2024-03-07 PROCEDURE — 25000003 PHARM REV CODE 250: Performed by: NURSE PRACTITIONER

## 2024-03-07 RX ORDER — HEPARIN 100 UNIT/ML
500 SYRINGE INTRAVENOUS
Status: DISCONTINUED | OUTPATIENT
Start: 2024-03-07 | End: 2024-03-07 | Stop reason: HOSPADM

## 2024-03-07 RX ORDER — SODIUM CHLORIDE 0.9 % (FLUSH) 0.9 %
10 SYRINGE (ML) INJECTION
Status: DISCONTINUED | OUTPATIENT
Start: 2024-03-07 | End: 2024-03-07 | Stop reason: HOSPADM

## 2024-03-07 RX ADMIN — HEPARIN 500 UNITS: 100 SYRINGE at 11:03

## 2024-03-07 RX ADMIN — PEGFILGRASTIM-CBQV 6 MG: 6 INJECTION, SOLUTION SUBCUTANEOUS at 10:03

## 2024-03-07 RX ADMIN — SODIUM CHLORIDE 1000 ML: 9 INJECTION, SOLUTION INTRAVENOUS at 10:03

## 2024-03-15 ENCOUNTER — TELEPHONE (OUTPATIENT)
Dept: HEMATOLOGY/ONCOLOGY | Facility: CLINIC | Age: 84
End: 2024-03-15
Payer: COMMERCIAL

## 2024-03-15 NOTE — TELEPHONE ENCOUNTER
Received message from [place], specialty pharmacist. Pt had reached out with concerns about Creon costs. I called and spoke to pt and she states that her current script was written for 3 month supply rather than 1 month supply. She was able to financially cover 1 month at a time but not 3 months. However, she was able to go ahead and purchase the 3 month script with some family help. I advised that there is a patient assistance program available if she needs to utilize that in the future. She verbalized understanding and has clinic contact info.

## 2024-03-19 ENCOUNTER — OFFICE VISIT (OUTPATIENT)
Dept: HEMATOLOGY/ONCOLOGY | Facility: CLINIC | Age: 84
End: 2024-03-19
Payer: COMMERCIAL

## 2024-03-19 ENCOUNTER — INFUSION (OUTPATIENT)
Dept: INFUSION THERAPY | Facility: HOSPITAL | Age: 84
End: 2024-03-19
Attending: INTERNAL MEDICINE
Payer: COMMERCIAL

## 2024-03-19 VITALS
HEART RATE: 73 BPM | DIASTOLIC BLOOD PRESSURE: 80 MMHG | WEIGHT: 137.38 LBS | BODY MASS INDEX: 25.12 KG/M2 | OXYGEN SATURATION: 98 % | SYSTOLIC BLOOD PRESSURE: 127 MMHG | RESPIRATION RATE: 18 BRPM

## 2024-03-19 VITALS — WEIGHT: 137.38 LBS | BODY MASS INDEX: 25.12 KG/M2

## 2024-03-19 DIAGNOSIS — C77.2 MALIGNANT NEOPLASM METASTATIC TO INTRA-ABDOMINAL LYMPH NODE: ICD-10-CM

## 2024-03-19 DIAGNOSIS — Z79.899 ON ANTINEOPLASTIC CHEMOTHERAPY: ICD-10-CM

## 2024-03-19 DIAGNOSIS — C25.9 PANCREATIC ADENOCARCINOMA: Primary | ICD-10-CM

## 2024-03-19 PROCEDURE — 96367 TX/PROPH/DG ADDL SEQ IV INF: CPT

## 2024-03-19 PROCEDURE — 3079F DIAST BP 80-89 MM HG: CPT | Mod: CPTII,S$GLB,, | Performed by: NURSE PRACTITIONER

## 2024-03-19 PROCEDURE — 25000003 PHARM REV CODE 250: Performed by: NURSE PRACTITIONER

## 2024-03-19 PROCEDURE — 96413 CHEMO IV INFUSION 1 HR: CPT

## 2024-03-19 PROCEDURE — 63600175 PHARM REV CODE 636 W HCPCS: Performed by: NURSE PRACTITIONER

## 2024-03-19 PROCEDURE — 96415 CHEMO IV INFUSION ADDL HR: CPT

## 2024-03-19 PROCEDURE — 99999 PR PBB SHADOW E&M-EST. PATIENT-LVL IV: CPT | Mod: PBBFAC,,, | Performed by: NURSE PRACTITIONER

## 2024-03-19 PROCEDURE — 3074F SYST BP LT 130 MM HG: CPT | Mod: CPTII,S$GLB,, | Performed by: NURSE PRACTITIONER

## 2024-03-19 PROCEDURE — 1160F RVW MEDS BY RX/DR IN RCRD: CPT | Mod: CPTII,S$GLB,, | Performed by: NURSE PRACTITIONER

## 2024-03-19 PROCEDURE — 1159F MED LIST DOCD IN RCRD: CPT | Mod: CPTII,S$GLB,, | Performed by: NURSE PRACTITIONER

## 2024-03-19 PROCEDURE — 96416 CHEMO PROLONG INFUSE W/PUMP: CPT

## 2024-03-19 PROCEDURE — 1101F PT FALLS ASSESS-DOCD LE1/YR: CPT | Mod: CPTII,S$GLB,, | Performed by: NURSE PRACTITIONER

## 2024-03-19 PROCEDURE — 96375 TX/PRO/DX INJ NEW DRUG ADDON: CPT

## 2024-03-19 PROCEDURE — 3288F FALL RISK ASSESSMENT DOCD: CPT | Mod: CPTII,S$GLB,, | Performed by: NURSE PRACTITIONER

## 2024-03-19 PROCEDURE — 99215 OFFICE O/P EST HI 40 MIN: CPT | Mod: S$GLB,,, | Performed by: NURSE PRACTITIONER

## 2024-03-19 RX ORDER — DIPHENHYDRAMINE HYDROCHLORIDE 50 MG/ML
25 INJECTION INTRAMUSCULAR; INTRAVENOUS
Status: CANCELLED
Start: 2024-03-19

## 2024-03-19 RX ORDER — FLUOROURACIL 50 MG/ML
2400 INJECTION, SOLUTION INTRAVENOUS
Status: CANCELLED | OUTPATIENT
Start: 2024-03-19

## 2024-03-19 RX ORDER — PROCHLORPERAZINE EDISYLATE 5 MG/ML
5 INJECTION INTRAMUSCULAR; INTRAVENOUS ONCE AS NEEDED
Status: DISCONTINUED | OUTPATIENT
Start: 2024-03-19 | End: 2024-03-19 | Stop reason: HOSPADM

## 2024-03-19 RX ORDER — FAMOTIDINE 10 MG/ML
20 INJECTION INTRAVENOUS
Status: CANCELLED
Start: 2024-03-19

## 2024-03-19 RX ORDER — HEPARIN 100 UNIT/ML
500 SYRINGE INTRAVENOUS
Status: DISCONTINUED | OUTPATIENT
Start: 2024-03-19 | End: 2024-03-19 | Stop reason: HOSPADM

## 2024-03-19 RX ORDER — HEPARIN 100 UNIT/ML
500 SYRINGE INTRAVENOUS
Status: CANCELLED | OUTPATIENT
Start: 2024-03-21

## 2024-03-19 RX ORDER — SODIUM CHLORIDE 0.9 % (FLUSH) 0.9 %
10 SYRINGE (ML) INJECTION
Status: CANCELLED | OUTPATIENT
Start: 2024-03-21

## 2024-03-19 RX ORDER — EPINEPHRINE 0.3 MG/.3ML
0.3 INJECTION SUBCUTANEOUS ONCE AS NEEDED
Status: DISCONTINUED | OUTPATIENT
Start: 2024-03-19 | End: 2024-03-19 | Stop reason: HOSPADM

## 2024-03-19 RX ORDER — SODIUM CHLORIDE 0.9 % (FLUSH) 0.9 %
10 SYRINGE (ML) INJECTION
Status: CANCELLED | OUTPATIENT
Start: 2024-03-19

## 2024-03-19 RX ORDER — ATROPINE SULFATE 0.4 MG/ML
0.4 INJECTION, SOLUTION ENDOTRACHEAL; INTRAMEDULLARY; INTRAMUSCULAR; INTRAVENOUS; SUBCUTANEOUS ONCE AS NEEDED
Status: CANCELLED | OUTPATIENT
Start: 2024-03-19

## 2024-03-19 RX ORDER — FAMOTIDINE 10 MG/ML
20 INJECTION INTRAVENOUS
Status: COMPLETED | OUTPATIENT
Start: 2024-03-19 | End: 2024-03-19

## 2024-03-19 RX ORDER — SODIUM CHLORIDE 0.9 % (FLUSH) 0.9 %
10 SYRINGE (ML) INJECTION
Status: DISCONTINUED | OUTPATIENT
Start: 2024-03-19 | End: 2024-03-19 | Stop reason: HOSPADM

## 2024-03-19 RX ORDER — DIPHENHYDRAMINE HYDROCHLORIDE 50 MG/ML
50 INJECTION INTRAMUSCULAR; INTRAVENOUS ONCE AS NEEDED
Status: DISCONTINUED | OUTPATIENT
Start: 2024-03-19 | End: 2024-03-19 | Stop reason: HOSPADM

## 2024-03-19 RX ORDER — DIPHENHYDRAMINE HYDROCHLORIDE 50 MG/ML
50 INJECTION INTRAMUSCULAR; INTRAVENOUS ONCE AS NEEDED
Status: CANCELLED | OUTPATIENT
Start: 2024-03-19

## 2024-03-19 RX ORDER — DIPHENHYDRAMINE HYDROCHLORIDE 50 MG/ML
25 INJECTION INTRAMUSCULAR; INTRAVENOUS
Status: COMPLETED | OUTPATIENT
Start: 2024-03-19 | End: 2024-03-19

## 2024-03-19 RX ORDER — HEPARIN 100 UNIT/ML
500 SYRINGE INTRAVENOUS
Status: CANCELLED | OUTPATIENT
Start: 2024-03-19

## 2024-03-19 RX ORDER — ATROPINE SULFATE 0.4 MG/ML
0.4 INJECTION, SOLUTION ENDOTRACHEAL; INTRAMEDULLARY; INTRAMUSCULAR; INTRAVENOUS; SUBCUTANEOUS ONCE AS NEEDED
Status: COMPLETED | OUTPATIENT
Start: 2024-03-19 | End: 2024-03-19

## 2024-03-19 RX ORDER — EPINEPHRINE 0.3 MG/.3ML
0.3 INJECTION SUBCUTANEOUS ONCE AS NEEDED
Status: CANCELLED | OUTPATIENT
Start: 2024-03-19

## 2024-03-19 RX ORDER — PROCHLORPERAZINE EDISYLATE 5 MG/ML
5 INJECTION INTRAMUSCULAR; INTRAVENOUS ONCE AS NEEDED
Status: CANCELLED | OUTPATIENT
Start: 2024-03-19

## 2024-03-19 RX ADMIN — DEXAMETHASONE SODIUM PHOSPHATE 0.25 MG: 4 INJECTION, SOLUTION INTRA-ARTICULAR; INTRALESIONAL; INTRAMUSCULAR; INTRAVENOUS; SOFT TISSUE at 11:03

## 2024-03-19 RX ADMIN — LEUCOVORIN CALCIUM 650 MG: 350 INJECTION, POWDER, LYOPHILIZED, FOR SOLUTION INTRAMUSCULAR; INTRAVENOUS at 11:03

## 2024-03-19 RX ADMIN — DIPHENHYDRAMINE HYDROCHLORIDE 25 MG: 50 INJECTION INTRAMUSCULAR; INTRAVENOUS at 11:03

## 2024-03-19 RX ADMIN — IRINOTECAN HYDROCHLORIDE 244 MG: 20 INJECTION, SOLUTION INTRAVENOUS at 11:03

## 2024-03-19 RX ADMIN — FLUOROURACIL 3900 MG: 50 INJECTION, SOLUTION INTRAVENOUS at 01:03

## 2024-03-19 RX ADMIN — ATROPINE SULFATE 0.4 MG: 0.4 INJECTION, SOLUTION INTRAVENOUS at 11:03

## 2024-03-19 RX ADMIN — FAMOTIDINE 20 MG: 10 INJECTION INTRAVENOUS at 11:03

## 2024-03-19 NOTE — PROGRESS NOTES
Subjective:       Patient ID: Darby Gary is a 83 y.o. female.    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  This was staged using CT scan.    Current Treatment:   Modified FOLFIRINOX without the 5FU bolus.  This treatment was started on 10/10/2023    Treatment History:  ERCP on 09/13/2023    HPI:  83-year-old female was seen by her nephrologist, blood work revealed elevated liver enzymes and bilirubin, she was instructed to present to the emergency department on 09/11/2023.  She presented to [facility] on 09/11/2023.  CMP in the emergency department revealed an ALT of 233, AST of 263, alkaline phosphatase of 1113, and a bilirubin of 2.3.  Hepatitis panel was done and was negative, lipase was also within normal limits.  A CEA was normal at 1.96, CA 19 9 was extremely elevated at 2461.89.  The patient underwent a CT scan of the abdomen and pelvis with contrast on 09/11/2023 that revealed suspected obstructive pancreatic head mass measuring 2.2 cm with moderate dilation of the biliary ductal system and severe dilation of the pancreatic duct.  There was 1-2 possible regional enlarged lymph nodes in the periportal region.  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  A biliary sphincterotomy was performed and 1 uncovered metal biliary stent was placed.  Brushings were obtained, cytology revealed adenocarcinoma.  She was discharged home in stable condition on 09/14/2023.  The patient was referred to Medical Oncology.  I saw the patient on 09/25/2023.  She stated that she was feeling okay.  She had no major issues to discuss.  She was seen by Dr. Chucky Olguin on 10/03/2023, underwent diagnostic laparoscopy and port placement on 10/10/2023.  No metastatic  disease seen on diagnostic laparoscopy.  Started modified FOLFIRINOX on 10/10/2023.    Interval History:   Patient is here today for a treatment visit.  She started modified FOLFIRINOX on 10/10/2023 and is due for cycle 12 today.  She has been tolerating treatment well and feels well overall.  She denies any pain, bowel difficulties, nausea.  Her main complaint today is numbness and tingling mainly in her legs and feet.  It does remain intermittent and somewhat improved since oxaliplatin discontinued.     Past Medical History:   Diagnosis Date    Arthritis     Diabetes mellitus     Hyperlipidemia     Hypertension     Osteoporosis     Pancreatic cancer       Past Surgical History:   Procedure Laterality Date    COLONOSCOPY      DIAGNOSTIC LAPAROSCOPY N/A 10/9/2023    Procedure: LAPAROSCOPY, DIAGNOSTIC  Req 12/12:30;  Surgeon: Chucky Olguin MD;  Location: [facility] OR;  Service: General;  Laterality: N/A;    ERCP N/A 9/13/2023    Procedure: ERCP (ENDOSCOPIC RETROGRADE CHOLANGIOPANCREATOGRAPHY)/ cytology brushing;  Surgeon: Erika Ulloa MD;  Location: [facility] ENDOSCOPY;  Service: Gastroenterology;  Laterality: N/A;    ERCP W/ SPHICTEROTOMY  9/13/2023    Procedure: ERCP, WITH SPHINCTEROTOMY;  Surgeon: Erika Ulloa MD;  Location: [facility] ENDOSCOPY;  Service: Gastroenterology;;    ERCP, WITH STENT INSERTION  9/13/2023    Procedure: ERCP, WITH STENT INSERTION;  Surgeon: Erika Ulloa MD;  Location: [facility] ENDOSCOPY;  Service: Gastroenterology;;    ESOPHAGOGASTRODUODENOSCOPY N/A 9/13/2023    Procedure: EGD (ESOPHAGOGASTRODUODENOSCOPY);  Surgeon: Erika Ulloa MD;  Location: [facility] ENDOSCOPY;  Service: Gastroenterology;  Laterality: N/A;    PLACEMENT, MEDIPORT Right 10/9/2023    Procedure: PLACEMENT, MEDIPORT;  Surgeon: Chucky Olguin MD;  Location: [facility] OR;  Service: General;  Laterality: Right;     Social History     Socioeconomic History    Marital status:    Tobacco Use    Smoking status:  Former     Types: Cigarettes    Smokeless tobacco: Never    Tobacco comments:     Quit over 25 years ago.    Substance and Sexual Activity    Alcohol use: Never    Drug use: Never    Sexual activity: Not Currently     Social Determinants of Health     Financial Resource Strain: High Risk (9/13/2023)    Overall Financial Resource Strain (CARDIA)     Difficulty of Paying Living Expenses: Hard   Food Insecurity: Food Insecurity Present (9/13/2023)    Hunger Vital Sign     Worried About Running Out of Food in the Last Year: Sometimes true     Ran Out of Food in the Last Year: Sometimes true   Transportation Needs: No Transportation Needs (9/13/2023)    PRAPARE - Transportation     Lack of Transportation (Medical): No     Lack of Transportation (Non-Medical): No   Physical Activity: Inactive (9/13/2023)    Exercise Vital Sign     Days of Exercise per Week: 0 days     Minutes of Exercise per Session: 0 min   Stress: No Stress Concern Present (9/13/2023)    [place] [employer] [place] of Occupational Health - Occupational Stress Questionnaire     Feeling of Stress : Only a little   Social Connections: Moderately Isolated (9/13/2023)    Social Connection and Isolation Panel [NHANES]     Frequency of Communication with Friends and Family: More than three times a week     Frequency of Social Gatherings with Friends and Family: More than three times a week     Attends [employer] Services: More than 4 times per year     Active Member of Clubs or Organizations: No     Attends Club or Organization Meetings: Never     Marital Status:    Housing Stability: Low Risk  (9/13/2023)    Housing Stability Vital Sign     Unable to Pay for Housing in the Last Year: No     Number of Places Lived in the Last Year: 1     Unstable Housing in the Last Year: No      Family History   Problem Relation Age of Onset    Leukemia Father     Prostate cancer Brother     Breast cancer Other 48    Breast cancer Maternal Aunt     Breast cancer Maternal Aunt      Breast cancer Maternal Aunt     Cancer Paternal Uncle       Review of patient's allergies indicates:  No Known Allergies   Review of Systems   Constitutional:  Negative for chills, diaphoresis, fatigue, fever and unexpected weight change.   HENT:  Negative for nasal congestion, mouth sores, sinus pressure/congestion and sore throat.    Eyes:  Negative for pain and visual disturbance.   Respiratory:  Negative for cough, chest tightness and shortness of breath.    Cardiovascular:  Negative for chest pain, palpitations and leg swelling.   Gastrointestinal:  Negative for abdominal distention, abdominal pain, blood in stool, constipation and diarrhea.   Genitourinary:  Negative for dysuria, frequency and hematuria.   Musculoskeletal:  Negative for arthralgias and back pain.   Integumentary:  Negative for rash.   Neurological:  Positive for numbness. Negative for dizziness, weakness and headaches.   Hematological:  Negative for adenopathy.   Psychiatric/Behavioral:  Negative for confusion.          Objective:      Physical Exam  Vitals reviewed.   Constitutional:       General: She is not in acute distress.     Appearance: Normal appearance.   HENT:      Head: Normocephalic and atraumatic.      Nose: Nose normal.      Mouth/Throat:      Mouth: Mucous membranes are moist.   Eyes:      Extraocular Movements: Extraocular movements intact.      Conjunctiva/sclera: Conjunctivae normal.   Cardiovascular:      Rate and Rhythm: Normal rate and regular rhythm.      Pulses: Normal pulses.      Heart sounds: Normal heart sounds.   Pulmonary:      Effort: Pulmonary effort is normal.      Breath sounds: Normal breath sounds.   Musculoskeletal:         General: No swelling. Normal range of motion.      Cervical back: Normal range of motion and neck supple.      Right lower leg: No edema.      Left lower leg: No edema.   Skin:     General: Skin is warm and dry.   Neurological:      General: No focal deficit present.      Mental Status:  She is alert and oriented to person, place, and time. Mental status is at baseline.   Psychiatric:         Mood and Affect: Mood normal.         Behavior: Behavior normal.         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  This was staged using CT scan.        Plan:       Patient has at least stage IIB pancreatic cancer based on CT abdomen/pelvis measurements.     PET/CT scan on 10/05/2023- No FDG avid metastatic disease identified. Focal uptake in the pancreatic head about a CBD stent compatible with patient's primary malignancy.    Patient saw Dr. Chucky Olguin on 10/03/2023, underwent port placement and diagnostic laparoscopy on 10/09/2023.  No metastatic disease seen.  Started modified FOLFIRINOX on 10/10/2023.  Oxaliplatin discontinued with cycle 11.    She has met with Genetics and results were negative.    Ca 19-9 is trending down and doing so consistently.    proceed with cycle 12.    Repeat scans in 2 weeks    Refer back to Dr. Olguin    RTC in 8 weeks     CBC, CMP, Mg, Ca 19-9    DONNIE TangP-C

## 2024-03-21 ENCOUNTER — INFUSION (OUTPATIENT)
Dept: INFUSION THERAPY | Facility: HOSPITAL | Age: 84
End: 2024-03-21
Attending: INTERNAL MEDICINE
Payer: COMMERCIAL

## 2024-03-21 VITALS
OXYGEN SATURATION: 98 % | DIASTOLIC BLOOD PRESSURE: 57 MMHG | TEMPERATURE: 98 F | HEART RATE: 85 BPM | SYSTOLIC BLOOD PRESSURE: 120 MMHG

## 2024-03-21 DIAGNOSIS — C25.9 PANCREATIC ADENOCARCINOMA: Primary | ICD-10-CM

## 2024-03-21 PROCEDURE — 96372 THER/PROPH/DIAG INJ SC/IM: CPT | Mod: 59

## 2024-03-21 PROCEDURE — 63600175 PHARM REV CODE 636 W HCPCS: Mod: JZ,JG | Performed by: NURSE PRACTITIONER

## 2024-03-21 PROCEDURE — 25000003 PHARM REV CODE 250: Performed by: NURSE PRACTITIONER

## 2024-03-21 PROCEDURE — A4216 STERILE WATER/SALINE, 10 ML: HCPCS | Performed by: NURSE PRACTITIONER

## 2024-03-21 PROCEDURE — 96360 HYDRATION IV INFUSION INIT: CPT

## 2024-03-21 RX ORDER — HEPARIN 100 UNIT/ML
500 SYRINGE INTRAVENOUS
Status: DISCONTINUED | OUTPATIENT
Start: 2024-03-21 | End: 2024-03-21 | Stop reason: HOSPADM

## 2024-03-21 RX ORDER — SODIUM CHLORIDE 0.9 % (FLUSH) 0.9 %
10 SYRINGE (ML) INJECTION
Status: DISCONTINUED | OUTPATIENT
Start: 2024-03-21 | End: 2024-03-21 | Stop reason: HOSPADM

## 2024-03-21 RX ADMIN — Medication 10 ML: at 12:03

## 2024-03-21 RX ADMIN — HEPARIN 500 UNITS: 100 SYRINGE at 12:03

## 2024-03-21 RX ADMIN — SODIUM CHLORIDE 1000 ML: 9 INJECTION, SOLUTION INTRAVENOUS at 10:03

## 2024-03-21 RX ADMIN — PEGFILGRASTIM-CBQV 6 MG: 6 INJECTION, SOLUTION SUBCUTANEOUS at 10:03

## 2024-04-02 ENCOUNTER — HOSPITAL ENCOUNTER (OUTPATIENT)
Dept: RADIOLOGY | Facility: HOSPITAL | Age: 84
Discharge: HOME | End: 2024-04-02
Attending: NURSE PRACTITIONER
Payer: COMMERCIAL

## 2024-04-02 DIAGNOSIS — C25.9 PANCREATIC ADENOCARCINOMA: ICD-10-CM

## 2024-04-02 DIAGNOSIS — C77.2 MALIGNANT NEOPLASM METASTATIC TO INTRA-ABDOMINAL LYMPH NODE: ICD-10-CM

## 2024-04-02 DIAGNOSIS — Z79.899 ON ANTINEOPLASTIC CHEMOTHERAPY: ICD-10-CM

## 2024-04-02 PROCEDURE — 74177 CT ABD & PELVIS W/CONTRAST: CPT | Mod: TC

## 2024-04-02 PROCEDURE — 25500020 PHARM REV CODE 255: Performed by: NURSE PRACTITIONER

## 2024-04-02 RX ADMIN — DIATRIZOATE MEGLUMINE AND DIATRIZOATE SODIUM 30 ML: 600; 100 SOLUTION ORAL; RECTAL at 12:04

## 2024-04-02 RX ADMIN — IOHEXOL 100 ML: 350 INJECTION, SOLUTION INTRAVENOUS at 01:04

## 2024-04-09 ENCOUNTER — OFFICE VISIT (OUTPATIENT)
Dept: SURGICAL ONCOLOGY | Facility: CLINIC | Age: 84
End: 2024-04-09
Payer: COMMERCIAL

## 2024-04-09 VITALS
HEIGHT: 62 IN | HEART RATE: 76 BPM | WEIGHT: 136 LBS | BODY MASS INDEX: 25.03 KG/M2 | DIASTOLIC BLOOD PRESSURE: 76 MMHG | SYSTOLIC BLOOD PRESSURE: 150 MMHG

## 2024-04-09 DIAGNOSIS — C25.9 PANCREATIC ADENOCARCINOMA: ICD-10-CM

## 2024-04-09 PROCEDURE — 3078F DIAST BP <80 MM HG: CPT | Mod: CPTII,S$GLB,, | Performed by: SURGERY

## 2024-04-09 PROCEDURE — 3077F SYST BP >= 140 MM HG: CPT | Mod: CPTII,S$GLB,, | Performed by: SURGERY

## 2024-04-09 PROCEDURE — 99999 PR PBB SHADOW E&M-EST. PATIENT-LVL IV: CPT | Mod: PBBFAC,,, | Performed by: SURGERY

## 2024-04-09 PROCEDURE — 3288F FALL RISK ASSESSMENT DOCD: CPT | Mod: CPTII,S$GLB,, | Performed by: SURGERY

## 2024-04-09 PROCEDURE — 1159F MED LIST DOCD IN RCRD: CPT | Mod: CPTII,S$GLB,, | Performed by: SURGERY

## 2024-04-09 PROCEDURE — 1101F PT FALLS ASSESS-DOCD LE1/YR: CPT | Mod: CPTII,S$GLB,, | Performed by: SURGERY

## 2024-04-09 PROCEDURE — 99215 OFFICE O/P EST HI 40 MIN: CPT | Mod: S$GLB,,, | Performed by: SURGERY

## 2024-04-09 NOTE — PROGRESS NOTES
History & Physical    CHIEF COMPLAINT:  PANCREATIC CANCER     History of Present Illness:  83 year-old-female referred by Dr. Erika Ulloa.  Patient was sent to the ER by her nephrologist for elevated liver enzymes and bilirubin.  In the ER, , , alkaline phos 1113, bilirubin 2.3; hepatitis panel negative, lipase normal; CEA 1.96,  2461.89.  CT abd/pel showed suspected obstructive pancreatic head mass measuring 2.2 cm with moderate dilation of the biliary ductal system and severe dilation of the pancreatic duct; possible regional enlarged lymph nodes in the periportal region.  ERCP performed, a single biliary stricture was found in the lower third of the main bile duct; the upper third of the main bile duct and middle third of the main bile duct were moderately dilated, with a mass causing an obstruction; biliary sphincterotomy was performed; cells for cytology obtained in the lower third of the main duct; One uncovered metal biliary stent was placed into the common bile duct.  Cytology revealed adenocarcinoma.  Patient has been seen by Dr. Forman.  PET scan performed 10/5/23 showed no evidence of metastatic disease.  She underwent diagnostic laparoscopy at the time of her MediPort placement that showed no evidence of occult metastatic disease therefore we proceed with treatment with curative intent.  She completed FOLFIRINOX neoadjuvant therapy on March 21, 2024 and tolerated this very well.  She maintains an excellent functional status for her age.    She presents today without complaint.  Restaging imaging was performed and I personally reviewed and interpreted the images and report.  There was no evidence of progression or metastatic disease.  CA 19 9 has responded dramatically down to 77        Greater than 60 minutes was required for complete chart review, imaging review including interpretation of imaging, coordination with referring/other physicians, patient counseling regarding  diagnosis/treatment plan, answering questions, medical decision making, surgical scheduling/coordination and documentation.    Review of patient's allergies indicates:  No Known Allergies    Current Outpatient Medications   Medication Sig Dispense Refill    aspirin (ECOTRIN) 81 MG EC tablet Take 1 tablet (81 mg total) by mouth once daily. 30 tablet 11    cyclobenzaprine HCl (CYCLOBENZAPRINE ORAL) Take 1 tablet by mouth 3 times daily as needed.      cyproheptadine (PERIACTIN) 4 mg tablet Take 4 mg by mouth 2 (two) times daily.      dexAMETHasone (DECADRON) 4 MG Tab TAKE TWO TABLETS BY MOUTH EVERY DAY ON DAYS 2 TO 3 OF EACH CHEMOTHERAPY CYCLE 6 tablet 5    ergocalciferol (ERGOCALCIFEROL) 50,000 unit Cap Take 50,000 Units by mouth every 7 days.      gabapentin (NEURONTIN) 300 MG capsule Take 300-600 mg by mouth every evening.      lipase-protease-amylase (CREON) 36,000-114,000- 180,000 unit CpDR       lisinopriL-hydrochlorothiazide (PRINZIDE,ZESTORETIC) 10-12.5 mg per tablet       loperamide (IMODIUM) 2 mg capsule Take 2 capsules by mouth at the onset of diarrhea then 1 capsule every 2 hours until diarrhea free for 12 hours. 30 capsule 11    metFORMIN (GLUCOPHAGE) 500 MG tablet Take 500 mg by mouth once daily.      mirtazapine (REMERON) 7.5 MG Tab Take 1 tablet (7.5 mg total) by mouth every evening. 30 tablet 3    mupirocin (BACTROBAN) 2 % ointment mupirocin 2 % topical ointment      ondansetron (ZOFRAN-ODT) 8 MG TbDL Take 1 tablet (8 mg total) by mouth every 6 (six) hours as needed (Nausea). 60 tablet 1    potassium chloride (KLOR-CON) 10 MEQ TbSR Take 1 tablet (10 mEq total) by mouth 2 (two) times daily. 60 tablet 3    prochlorperazine (COMPAZINE) 5 MG tablet Take 1 tablet (5 mg total) by mouth every 6 (six) hours as needed for Nausea. 20 tablet 5    PROCTO-MED HC 2.5 % rectal cream SMARTSIG:Topical      simvastatin (ZOCOR) 40 MG tablet SMARTSI Tablet(s) By Mouth Every Evening      simvastatin (ZOCOR) 40 MG tablet  Take 40 mg by mouth once daily.      triamcinolone acetonide 0.025% (KENALOG) 0.025 % cream triamcinolone acetonide 0.025 % topical cream   APPLY TO THE AFFECTED AREA(S) ONCE a DAY      valACYclovir (VALTREX) 1000 MG tablet Take 1 tablet (1,000 mg total) by mouth 2 (two) times daily. 14 tablet 0     No current facility-administered medications for this visit.       Past Medical History:   Diagnosis Date    Arthritis     Diabetes mellitus     Hyperlipidemia     Hypertension     Osteoporosis     Pancreatic cancer      Past Surgical History:   Procedure Laterality Date    COLONOSCOPY      DIAGNOSTIC LAPAROSCOPY N/A 10/9/2023    Procedure: LAPAROSCOPY, DIAGNOSTIC  Req 12/12:30;  Surgeon: Chucky Olguin MD;  Location: [facility] OR;  Service: General;  Laterality: N/A;    ERCP N/A 9/13/2023    Procedure: ERCP (ENDOSCOPIC RETROGRADE CHOLANGIOPANCREATOGRAPHY)/ cytology brushing;  Surgeon: Erika Ulloa MD;  Location: [facility] ENDOSCOPY;  Service: Gastroenterology;  Laterality: N/A;    ERCP W/ SPHICTEROTOMY  9/13/2023    Procedure: ERCP, WITH SPHINCTEROTOMY;  Surgeon: Erika Ulloa MD;  Location: [facility] ENDOSCOPY;  Service: Gastroenterology;;    ERCP, WITH STENT INSERTION  9/13/2023    Procedure: ERCP, WITH STENT INSERTION;  Surgeon: Erika Ulloa MD;  Location: [facility] ENDOSCOPY;  Service: Gastroenterology;;    ESOPHAGOGASTRODUODENOSCOPY N/A 9/13/2023    Procedure: EGD (ESOPHAGOGASTRODUODENOSCOPY);  Surgeon: Erika Ulloa MD;  Location: [facility] ENDOSCOPY;  Service: Gastroenterology;  Laterality: N/A;    PLACEMENT, MEDIPORT Right 10/9/2023    Procedure: PLACEMENT, MEDIPORT;  Surgeon: Chucky Olguin MD;  Location: [facility] OR;  Service: General;  Laterality: Right;     Family History   Problem Relation Age of Onset    Leukemia Father     Prostate cancer Brother     Breast cancer Other 48    Breast cancer Maternal Aunt     Breast cancer Maternal Aunt     Breast cancer Maternal Aunt     Cancer Paternal Uncle   "    Social History     Tobacco Use    Smoking status: Former     Types: Cigarettes    Smokeless tobacco: Never    Tobacco comments:     Quit over 25 years ago.    Substance Use Topics    Alcohol use: Never    Drug use: Never        Review of Systems:  Review of Systems   Constitutional:  Negative for appetite change, chills, diaphoresis and fever.   HENT:  Negative for congestion, drooling, ear discharge, ear pain and hearing loss.    Eyes:  Negative for discharge.   Respiratory:  Negative for apnea, cough, choking, chest tightness, shortness of breath and stridor.    Cardiovascular:  Negative for chest pain, palpitations and leg swelling.   Endocrine: Negative for cold intolerance and heat intolerance.   Genitourinary:  Negative for difficulty urinating, dyspareunia, dysuria and hematuria.   Musculoskeletal:  Negative for arthralgias, gait problem and joint swelling.   Skin:  Negative for color change and rash.   Neurological:  Negative for dizziness, tremors, seizures, syncope, facial asymmetry, speech difficulty, light-headedness, numbness and headaches.   Psychiatric/Behavioral:  Negative for agitation and confusion.        OBJECTIVE:     Vital Signs (Most Recent)  Pulse: 76 (04/09/24 1025)  BP: (!) 150/76 (Pt states took Bp med this am & she is nervous) (04/09/24 1025)  5' 2" (1.575 m)  61.7 kg (136 lb)     Physical Exam:  Physical Exam  Constitutional:       General: She is not in acute distress.     Appearance: Normal appearance. She is not toxic-appearing.   HENT:      Head: Normocephalic and atraumatic.      Right Ear: External ear normal.      Left Ear: External ear normal.      Mouth/Throat:      Mouth: Mucous membranes are moist.   Eyes:      General: No scleral icterus.     Conjunctiva/sclera: Conjunctivae normal.      Pupils: Pupils are equal, round, and reactive to light.   Cardiovascular:      Rate and Rhythm: Normal rate and regular rhythm.      Pulses: Normal pulses.   Pulmonary:      Effort: " Pulmonary effort is normal. No respiratory distress.      Breath sounds: Normal breath sounds. No stridor. No wheezing or rhonchi.   Abdominal:      General: Abdomen is flat. There is no distension.      Palpations: Abdomen is soft. There is no mass.      Tenderness: There is no abdominal tenderness. There is no guarding or rebound.      Hernia: No hernia is present.   Musculoskeletal:         General: No swelling or tenderness. Normal range of motion.      Cervical back: Normal range of motion and neck supple.      Right lower leg: No edema.      Left lower leg: No edema.   Skin:     General: Skin is warm.      Capillary Refill: Capillary refill takes less than 2 seconds.      Coloration: Skin is not jaundiced or pale.      Findings: No erythema or rash.   Neurological:      General: No focal deficit present.      Mental Status: She is alert and oriented to person, place, and time.      Gait: Gait normal.   Psychiatric:         Mood and Affect: Mood normal.         Behavior: Behavior normal.         Judgment: Judgment normal.         ASSESSMENT/PLAN:     Adenocarcinoma of the pancreatic head, borderline resectable with evidence of regional lymphadenopathy    Diagnosis, staging, prognosis and treatment guidelines for pancreatic cancer were again discussed with the patient in detail, all questions were addressed.  She is completed neoadjuvant FOLFIRINOX and done very well, CA 19 9 has responded significantly and no progression of disease on restaging imaging.    We discussed the risks and benefits of proceeding with aggressive surgical intervention.  She is interested in pursuing this, and given her excellent functional status for her age I think she has a surgical candidate.  We discussed the magnitude of the surgery in detail.  Her and her family's questions were answered in full.    Preoperative cardiac evaluation    Schedule laparoscopic/robotic pancreaticoduodenectomy/Whipple procedure    The risks and benefits  of the procedure were explained in detail using layman terms, including the pros and cons of any implant that may be used, all questions were addressed, the patient gives consent to proceed    Chucky Olguin MD  Surgical Oncology  Complex General, Gastrointestinal and Hepatobiliary Surgery

## 2024-04-18 DIAGNOSIS — Z01.818 PREOP TESTING: ICD-10-CM

## 2024-04-18 DIAGNOSIS — C25.9 PANCREATIC CANCER: ICD-10-CM

## 2024-04-18 DIAGNOSIS — C25.9 PANCREATIC ADENOCARCINOMA: Primary | ICD-10-CM

## 2024-04-18 RX ORDER — METRONIDAZOLE 500 MG/100ML
500 INJECTION, SOLUTION INTRAVENOUS ONCE
Status: CANCELLED | OUTPATIENT
Start: 2024-05-16

## 2024-04-18 RX ORDER — ENOXAPARIN SODIUM 300 MG/3ML
30 INJECTION INTRAVENOUS; SUBCUTANEOUS EVERY 24 HOURS
Status: CANCELLED | OUTPATIENT
Start: 2024-04-18

## 2024-04-18 RX ORDER — HYDROCODONE BITARTRATE AND ACETAMINOPHEN 500; 5 MG/1; MG/1
TABLET ORAL
Status: CANCELLED | OUTPATIENT
Start: 2024-05-16

## 2024-04-18 RX ORDER — ALVIMOPAN 12 MG/1
12 CAPSULE ORAL ONCE
Status: CANCELLED | OUTPATIENT
Start: 2024-05-16 | End: 2024-05-22

## 2024-04-19 ENCOUNTER — OFFICE VISIT (OUTPATIENT)
Dept: URGENT CARE | Facility: CLINIC | Age: 84
End: 2024-04-19
Payer: COMMERCIAL

## 2024-04-19 VITALS
SYSTOLIC BLOOD PRESSURE: 144 MMHG | BODY MASS INDEX: 25.44 KG/M2 | TEMPERATURE: 99 F | OXYGEN SATURATION: 98 % | RESPIRATION RATE: 20 BRPM | HEIGHT: 62 IN | WEIGHT: 138.25 LBS | DIASTOLIC BLOOD PRESSURE: 81 MMHG | HEART RATE: 90 BPM

## 2024-04-19 DIAGNOSIS — R20.2 PARESTHESIA OF BOTH FEET: Primary | ICD-10-CM

## 2024-04-19 PROCEDURE — 99213 OFFICE O/P EST LOW 20 MIN: CPT | Mod: S$PBB,,, | Performed by: FAMILY MEDICINE

## 2024-04-19 PROCEDURE — 99215 OFFICE O/P EST HI 40 MIN: CPT | Mod: PBBFAC | Performed by: FAMILY MEDICINE

## 2024-04-19 RX ORDER — GABAPENTIN 100 MG/1
100 CAPSULE ORAL NIGHTLY
Qty: 30 CAPSULE | Refills: 11 | Status: SHIPPED | OUTPATIENT
Start: 2024-04-19 | End: 2025-04-19

## 2024-04-19 NOTE — PROGRESS NOTES
"Subjective:       Patient ID: Darby Gary is a 83 y.o. female.    Vitals:  height is 5' 2" (1.575 m) and weight is 62.7 kg (138 lb 3.7 oz). Her temperature is 98.5 °F (36.9 °C). Her blood pressure is 144/81 (abnormal) and her pulse is 90. Her respiration is 20 and oxygen saturation is 98%.     Chief Complaint: Foot Pain (States feels like "sticking sensation" to bilat feet  x 1mo )    Patient with bilateral lower leg paresthesias, involve the bottom of both feet as well.  Denies new rash.  States she was told it was related to chemotherapy.  Is requesting to restart Neurontin.  She states she has tolerated this some time in the past.  Having no new symptoms per se.  She is followed by a PCP.  Has been referred to a podiatrist?    All other systems are negative    Brief chart review    Objective:   Physical Exam   Constitutional: She appears well-developed.  Non-toxic appearance. She does not appear ill. No distress.   Abdominal: Normal appearance.   Musculoskeletal:      Right ankle: Normal. Achilles tendon normal.      Left ankle: Normal. Achilles tendon normal.      Right foot: Normal.      Left foot: Normal.   Neurological: She is alert.   Skin: Skin is not diaphoretic.   Nursing note and vitals reviewed.        Assessment:     1. Paresthesia of both feet            Plan:   We had a lengthy discussion about the risks of gabapentin in her age group.  States she tolerated it in the past.  After must discussion, agreed to prescribe a few that she will only take right before bedtime.  She understands the risks of falls.  She states she is suffering and is wanting to proceed with that medication.  I encouraged her to follow-up with her PCP and other arranged providers.  Return to urgent care if we can help.      Paresthesia of both feet  -     gabapentin (NEURONTIN) 100 MG capsule; Take 1 capsule (100 mg total) by mouth every evening.  Dispense: 30 capsule; Refill: 11        Please note: This chart was completed " via voice to text dictation. It may contain typographical/word recognition errors. If there are any questions, please contact the provider for final clarification.

## 2024-04-29 DIAGNOSIS — C25.9 PANCREATIC ADENOCARCINOMA: Primary | ICD-10-CM

## 2024-04-29 RX ORDER — PANCRELIPASE 36000; 180000; 114000 [USP'U]/1; [USP'U]/1; [USP'U]/1
CAPSULE, DELAYED RELEASE PELLETS ORAL
Qty: 50 CAPSULE | Refills: 6 | Status: SHIPPED | OUTPATIENT
Start: 2024-04-29

## 2024-05-07 ENCOUNTER — LAB VISIT (OUTPATIENT)
Dept: LAB | Facility: HOSPITAL | Age: 84
End: 2024-05-07
Attending: NURSE PRACTITIONER
Payer: COMMERCIAL

## 2024-05-07 DIAGNOSIS — C25.9 PANCREATIC ADENOCARCINOMA: ICD-10-CM

## 2024-05-07 DIAGNOSIS — C77.2 MALIGNANT NEOPLASM METASTATIC TO INTRA-ABDOMINAL LYMPH NODE: ICD-10-CM

## 2024-05-07 DIAGNOSIS — Z01.818 PREOP TESTING: ICD-10-CM

## 2024-05-07 DIAGNOSIS — Z79.899 ON ANTINEOPLASTIC CHEMOTHERAPY: ICD-10-CM

## 2024-05-07 LAB
ALBUMIN SERPL-MCNC: 3.8 G/DL (ref 3.4–4.8)
ALBUMIN/GLOB SERPL: 1.2 RATIO (ref 1.1–2)
ALP SERPL-CCNC: 100 UNIT/L (ref 40–150)
ALT SERPL-CCNC: 17 UNIT/L (ref 0–55)
AST SERPL-CCNC: 21 UNIT/L (ref 5–34)
BASOPHILS # BLD AUTO: 0.03 X10(3)/MCL
BASOPHILS NFR BLD AUTO: 0.4 %
BILIRUB SERPL-MCNC: 0.3 MG/DL
BUN SERPL-MCNC: 18.6 MG/DL (ref 9.8–20.1)
CALCIUM SERPL-MCNC: 9.3 MG/DL (ref 8.4–10.2)
CANCER AG19-9 SERPL-ACNC: 313.21 UNIT/ML (ref 0–37)
CHLORIDE SERPL-SCNC: 109 MMOL/L (ref 98–107)
CO2 SERPL-SCNC: 26 MMOL/L (ref 23–31)
CREAT SERPL-MCNC: 0.75 MG/DL (ref 0.55–1.02)
EOSINOPHIL # BLD AUTO: 0.45 X10(3)/MCL (ref 0–0.9)
EOSINOPHIL NFR BLD AUTO: 6.3 %
ERYTHROCYTE [DISTWIDTH] IN BLOOD BY AUTOMATED COUNT: 12.8 % (ref 11.5–17)
GLOBULIN SER-MCNC: 3.2 GM/DL (ref 2.4–3.5)
GLUCOSE SERPL-MCNC: 110 MG/DL (ref 82–115)
HCT VFR BLD AUTO: 36.9 % (ref 37–47)
HGB BLD-MCNC: 11.8 G/DL (ref 12–16)
IMM GRANULOCYTES # BLD AUTO: 0.02 X10(3)/MCL (ref 0–0.04)
IMM GRANULOCYTES NFR BLD AUTO: 0.3 %
LYMPHOCYTES # BLD AUTO: 1.89 X10(3)/MCL (ref 0.6–4.6)
LYMPHOCYTES NFR BLD AUTO: 26.5 %
MAGNESIUM SERPL-MCNC: 1.8 MG/DL (ref 1.6–2.6)
MCH RBC QN AUTO: 30.9 PG (ref 27–31)
MCHC RBC AUTO-ENTMCNC: 32 G/DL (ref 33–36)
MCV RBC AUTO: 96.6 FL (ref 80–94)
MONOCYTES # BLD AUTO: 0.53 X10(3)/MCL (ref 0.1–1.3)
MONOCYTES NFR BLD AUTO: 7.4 %
NEUTROPHILS # BLD AUTO: 4.22 X10(3)/MCL (ref 2.1–9.2)
NEUTROPHILS NFR BLD AUTO: 59.1 %
NRBC BLD AUTO-RTO: 0 %
OHS QRS DURATION: 82 MS
OHS QTC CALCULATION: 429 MS
PLATELET # BLD AUTO: 205 X10(3)/MCL (ref 130–400)
PMV BLD AUTO: 9.4 FL (ref 7.4–10.4)
POTASSIUM SERPL-SCNC: 4.1 MMOL/L (ref 3.5–5.1)
PROT SERPL-MCNC: 7 GM/DL (ref 5.8–7.6)
RBC # BLD AUTO: 3.82 X10(6)/MCL (ref 4.2–5.4)
SODIUM SERPL-SCNC: 139 MMOL/L (ref 136–145)
WBC # SPEC AUTO: 7.14 X10(3)/MCL (ref 4.5–11.5)

## 2024-05-07 PROCEDURE — 85025 COMPLETE CBC W/AUTO DIFF WBC: CPT

## 2024-05-07 PROCEDURE — 80053 COMPREHEN METABOLIC PANEL: CPT

## 2024-05-07 PROCEDURE — 83735 ASSAY OF MAGNESIUM: CPT

## 2024-05-07 PROCEDURE — 86301 IMMUNOASSAY TUMOR CA 19-9: CPT

## 2024-05-07 PROCEDURE — 36415 COLL VENOUS BLD VENIPUNCTURE: CPT

## 2024-05-07 PROCEDURE — 93005 ELECTROCARDIOGRAM TRACING: CPT

## 2024-05-08 ENCOUNTER — OFFICE VISIT (OUTPATIENT)
Dept: SURGICAL ONCOLOGY | Facility: CLINIC | Age: 84
End: 2024-05-08
Payer: COMMERCIAL

## 2024-05-08 VITALS
DIASTOLIC BLOOD PRESSURE: 75 MMHG | WEIGHT: 138 LBS | HEART RATE: 81 BPM | HEIGHT: 62 IN | SYSTOLIC BLOOD PRESSURE: 138 MMHG | BODY MASS INDEX: 25.4 KG/M2

## 2024-05-08 DIAGNOSIS — C25.9 PANCREATIC ADENOCARCINOMA: Primary | ICD-10-CM

## 2024-05-08 PROCEDURE — 3288F FALL RISK ASSESSMENT DOCD: CPT | Mod: CPTII,S$GLB,, | Performed by: SURGERY

## 2024-05-08 PROCEDURE — 1101F PT FALLS ASSESS-DOCD LE1/YR: CPT | Mod: CPTII,S$GLB,, | Performed by: SURGERY

## 2024-05-08 PROCEDURE — G2211 COMPLEX E/M VISIT ADD ON: HCPCS | Mod: S$GLB,,, | Performed by: SURGERY

## 2024-05-08 PROCEDURE — 3078F DIAST BP <80 MM HG: CPT | Mod: CPTII,S$GLB,, | Performed by: SURGERY

## 2024-05-08 PROCEDURE — 99214 OFFICE O/P EST MOD 30 MIN: CPT | Mod: S$GLB,,, | Performed by: SURGERY

## 2024-05-08 PROCEDURE — 1159F MED LIST DOCD IN RCRD: CPT | Mod: CPTII,S$GLB,, | Performed by: SURGERY

## 2024-05-08 PROCEDURE — 3075F SYST BP GE 130 - 139MM HG: CPT | Mod: CPTII,S$GLB,, | Performed by: SURGERY

## 2024-05-08 PROCEDURE — 99999 PR PBB SHADOW E&M-EST. PATIENT-LVL III: CPT | Mod: PBBFAC,,, | Performed by: SURGERY

## 2024-05-08 NOTE — PROGRESS NOTES
History & Physical    CHIEF COMPLAINT:  PANCREATIC CANCER     History of Present Illness:  83 year-old-female referred by Dr. Erika Ulloa.  Patient was sent to the ER by her nephrologist for elevated liver enzymes and bilirubin.  In the ER, , , alkaline phos 1113, bilirubin 2.3; hepatitis panel negative, lipase normal; CEA 1.96,  2461.89.  CT abd/pel showed suspected obstructive pancreatic head mass measuring 2.2 cm with moderate dilation of the biliary ductal system and severe dilation of the pancreatic duct; possible regional enlarged lymph nodes in the periportal region.  ERCP performed, a single biliary stricture was found in the lower third of the main bile duct; the upper third of the main bile duct and middle third of the main bile duct were moderately dilated, with a mass causing an obstruction; biliary sphincterotomy was performed; cells for cytology obtained in the lower third of the main duct; One uncovered metal biliary stent was placed into the common bile duct.  Cytology revealed adenocarcinoma.  Patient has been seen by Dr. Forman.  PET scan performed 10/5/23 showed no evidence of metastatic disease.  She underwent diagnostic laparoscopy at the time of her MediPort placement that showed no evidence of occult metastatic disease therefore we proceed with treatment with curative intent.  She completed FOLFIRINOX neoadjuvant therapy on March 21, 2024 and tolerated this very well.  She maintains an excellent functional status for her age.    She presents today without complaint.  Restaging imaging was performed and I personally reviewed and interpreted the images and report.  There was no evidence of progression or metastatic disease.  CA 19 9 has responded dramatically down to 77    She presents after cardiac evaluation which was satisfactory.  She has no new complaints today.        Greater than 30 minutes was required for complete chart review, imaging review including  interpretation of imaging, coordination with referring/other physicians, patient counseling regarding diagnosis/treatment plan, answering questions, medical decision making, surgical scheduling/coordination and documentation.    Visit today included increased complexity associated with the care of the episodic problem pancreatic cancer addressed and managing the longitudinal care of the patient due to the serious and/or complex managed problem(s) pancreatic cancer.    Review of patient's allergies indicates:  No Known Allergies    Current Outpatient Medications   Medication Sig Dispense Refill    aspirin (ECOTRIN) 81 MG EC tablet Take 1 tablet (81 mg total) by mouth once daily. 30 tablet 11    cyclobenzaprine HCl (CYCLOBENZAPRINE ORAL) Take 1 tablet by mouth 3 times daily as needed.      cyproheptadine (PERIACTIN) 4 mg tablet Take 4 mg by mouth 2 (two) times daily.      dexAMETHasone (DECADRON) 4 MG Tab TAKE TWO TABLETS BY MOUTH EVERY DAY ON DAYS 2 TO 3 OF EACH CHEMOTHERAPY CYCLE 6 tablet 5    ergocalciferol (ERGOCALCIFEROL) 50,000 unit Cap Take 50,000 Units by mouth every 7 days.      gabapentin (NEURONTIN) 100 MG capsule Take 1 capsule (100 mg total) by mouth every evening. 30 capsule 11    gabapentin (NEURONTIN) 300 MG capsule Take 300-600 mg by mouth every evening.      lipase-protease-amylase (CREON) 36,000-114,000- 180,000 unit CpDR Take one capsule po tid with meals 50 capsule 6    lisinopriL-hydrochlorothiazide (PRINZIDE,ZESTORETIC) 10-12.5 mg per tablet       loperamide (IMODIUM) 2 mg capsule Take 2 capsules by mouth at the onset of diarrhea then 1 capsule every 2 hours until diarrhea free for 12 hours. 30 capsule 11    metFORMIN (GLUCOPHAGE) 500 MG tablet Take 500 mg by mouth once daily.      mirtazapine (REMERON) 7.5 MG Tab Take 1 tablet (7.5 mg total) by mouth every evening. 30 tablet 3    mupirocin (BACTROBAN) 2 % ointment mupirocin 2 % topical ointment      ondansetron (ZOFRAN-ODT) 8 MG TbDL Take 1  tablet (8 mg total) by mouth every 6 (six) hours as needed (Nausea). 60 tablet 1    potassium chloride (KLOR-CON) 10 MEQ TbSR Take 1 tablet (10 mEq total) by mouth 2 (two) times daily. 60 tablet 3    prochlorperazine (COMPAZINE) 5 MG tablet Take 1 tablet (5 mg total) by mouth every 6 (six) hours as needed for Nausea. 20 tablet 5    PROCTO-MED HC 2.5 % rectal cream SMARTSIG:Topical      simvastatin (ZOCOR) 40 MG tablet       simvastatin (ZOCOR) 40 MG tablet Take 40 mg by mouth once daily.      triamcinolone acetonide 0.025% (KENALOG) 0.025 % cream       valACYclovir (VALTREX) 1000 MG tablet Take 1 tablet (1,000 mg total) by mouth 2 (two) times daily. 14 tablet 0     No current facility-administered medications for this visit.       Past Medical History:   Diagnosis Date    Arthritis     Diabetes mellitus     Hyperlipidemia     Hypertension     Kidney cysts     Neuropathy     Osteoporosis     Pancreatic cancer     Taste sense altered     Weight loss, unintentional      Past Surgical History:   Procedure Laterality Date    CATARACT EXTRACTION Bilateral     COLONOSCOPY      DIAGNOSTIC LAPAROSCOPY N/A 10/09/2023    Procedure: LAPAROSCOPY, DIAGNOSTIC  Req 12/12:30;  Surgeon: Chucky Olguin MD;  Location: [facility] OR;  Service: General;  Laterality: N/A;    ERCP N/A 09/13/2023    Procedure: ERCP (ENDOSCOPIC RETROGRADE CHOLANGIOPANCREATOGRAPHY)/ cytology brushing;  Surgeon: Erika Ulloa MD;  Location: [facility] ENDOSCOPY;  Service: Gastroenterology;  Laterality: N/A;    ERCP W/ SPHICTEROTOMY  09/13/2023    Procedure: ERCP, WITH SPHINCTEROTOMY;  Surgeon: Erika Ulloa MD;  Location: [facility] ENDOSCOPY;  Service: Gastroenterology;;    ERCP, WITH STENT INSERTION  09/13/2023    Procedure: ERCP, WITH STENT INSERTION;  Surgeon: Erika Ulloa MD;  Location: [facility] ENDOSCOPY;  Service: Gastroenterology;;    ESOPHAGOGASTRODUODENOSCOPY N/A 09/13/2023    Procedure: EGD (ESOPHAGOGASTRODUODENOSCOPY);  Surgeon: Artemio  "Erika GRAHAM MD;  Location: [facility] ENDOSCOPY;  Service: Gastroenterology;  Laterality: N/A;    PLACEMENT, MEDIPORT Right 10/09/2023    Procedure: PLACEMENT, MEDIPORT;  Surgeon: Chucky Olguin MD;  Location: [facility] OR;  Service: General;  Laterality: Right;     Family History   Problem Relation Name Age of Onset    Leukemia Father      Prostate cancer Brother      Breast cancer Other niece 48    Breast cancer Maternal Aunt      Breast cancer Maternal Aunt      Breast cancer Maternal Aunt      Cancer Paternal Uncle       Social History     Tobacco Use    Smoking status: Former     Types: Cigarettes    Smokeless tobacco: Never    Tobacco comments:     Quit over 25 years ago.    Substance Use Topics    Alcohol use: Never    Drug use: Never        Review of Systems:  Review of Systems   Constitutional:  Negative for appetite change, chills, diaphoresis and fever.   HENT:  Negative for congestion, drooling, ear discharge, ear pain and hearing loss.    Eyes:  Negative for discharge.   Respiratory:  Negative for apnea, cough, choking, chest tightness, shortness of breath and stridor.    Cardiovascular:  Negative for chest pain, palpitations and leg swelling.   Endocrine: Negative for cold intolerance and heat intolerance.   Genitourinary:  Negative for difficulty urinating, dyspareunia, dysuria and hematuria.   Musculoskeletal:  Negative for arthralgias, gait problem and joint swelling.   Skin:  Negative for color change and rash.   Neurological:  Negative for dizziness, tremors, seizures, syncope, facial asymmetry, speech difficulty, light-headedness, numbness and headaches.   Psychiatric/Behavioral:  Negative for agitation and confusion.        OBJECTIVE:     Vital Signs (Most Recent)  Pulse: 81 (05/08/24 1318)  BP: 138/75 (05/08/24 1318)  5' 2" (1.575 m)  62.6 kg (138 lb)     Physical Exam:  Physical Exam  Constitutional:       General: She is not in acute distress.     Appearance: Normal appearance. She is not " toxic-appearing.   HENT:      Head: Normocephalic and atraumatic.      Right Ear: External ear normal.      Left Ear: External ear normal.      Mouth/Throat:      Mouth: Mucous membranes are moist.   Eyes:      General: No scleral icterus.     Conjunctiva/sclera: Conjunctivae normal.      Pupils: Pupils are equal, round, and reactive to light.   Cardiovascular:      Rate and Rhythm: Normal rate and regular rhythm.      Pulses: Normal pulses.   Pulmonary:      Effort: Pulmonary effort is normal. No respiratory distress.      Breath sounds: Normal breath sounds. No stridor. No wheezing or rhonchi.   Abdominal:      General: Abdomen is flat. There is no distension.      Palpations: Abdomen is soft. There is no mass.      Tenderness: There is no abdominal tenderness. There is no guarding or rebound.      Hernia: No hernia is present.   Musculoskeletal:         General: No swelling or tenderness. Normal range of motion.      Cervical back: Normal range of motion and neck supple.      Right lower leg: No edema.      Left lower leg: No edema.   Skin:     General: Skin is warm.      Capillary Refill: Capillary refill takes less than 2 seconds.      Coloration: Skin is not jaundiced or pale.      Findings: No erythema or rash.   Neurological:      General: No focal deficit present.      Mental Status: She is alert and oriented to person, place, and time.      Gait: Gait normal.   Psychiatric:         Mood and Affect: Mood normal.         Behavior: Behavior normal.         Judgment: Judgment normal.         ASSESSMENT/PLAN:     Adenocarcinoma of the pancreatic head, borderline resectable with evidence of regional lymphadenopathy    Diagnosis, staging, prognosis and treatment guidelines for pancreatic cancer were again discussed with the patient in detail, all questions were addressed.  She is completed neoadjuvant FOLFIRINOX and done very well, CA 19 9 has responded significantly and no progression of disease on restaging  imaging.    We again discussed the risks and benefits of proceeding with aggressive surgical intervention.  She is interested in pursuing this, and given her excellent functional status for her age I think she has a surgical candidate.  We again discussed the magnitude of the surgery in detail.  Her questions were answered in full.    Preoperative cardiac evaluation satisfactory cleared at low risk with no further intervention or workup    Proceed with laparoscopic/robotic pancreaticoduodenectomy/Whipple procedure    The risks and benefits of the procedure were explained in detail using layman terms, including the pros and cons of any implant that may be used, all questions were addressed, the patient gives consent to proceed    Chucky Olguin MD  Surgical Oncology  Complex General, Gastrointestinal and Hepatobiliary Surgery

## 2024-05-10 DIAGNOSIS — C25.9 PANCREATIC ADENOCARCINOMA: Primary | ICD-10-CM

## 2024-05-14 DIAGNOSIS — C25.9 PANCREATIC ADENOCARCINOMA: Primary | ICD-10-CM

## 2024-05-14 RX ORDER — POTASSIUM CHLORIDE 750 MG/1
10 TABLET, EXTENDED RELEASE ORAL 2 TIMES DAILY
Qty: 60 TABLET | Refills: 1 | Status: SHIPPED | OUTPATIENT
Start: 2024-05-14

## 2024-05-21 ENCOUNTER — DOCUMENTATION ONLY (OUTPATIENT)
Dept: SURGICAL ONCOLOGY | Facility: CLINIC | Age: 84
End: 2024-05-21
Payer: COMMERCIAL

## 2024-05-21 NOTE — PROGRESS NOTES
Notified by pre-service patient's whipple procedure has been approved.  Spoke with patient, new surgery date on 6/24/24 given to her.

## 2024-05-22 ENCOUNTER — INFUSION (OUTPATIENT)
Dept: INFUSION THERAPY | Facility: HOSPITAL | Age: 84
End: 2024-05-22
Attending: INTERNAL MEDICINE
Payer: COMMERCIAL

## 2024-05-22 VITALS
HEART RATE: 68 BPM | BODY MASS INDEX: 26.06 KG/M2 | HEIGHT: 62 IN | SYSTOLIC BLOOD PRESSURE: 149 MMHG | DIASTOLIC BLOOD PRESSURE: 86 MMHG | RESPIRATION RATE: 18 BRPM | OXYGEN SATURATION: 99 % | WEIGHT: 141.63 LBS | TEMPERATURE: 98 F

## 2024-05-22 DIAGNOSIS — C25.9 PANCREATIC ADENOCARCINOMA: Primary | ICD-10-CM

## 2024-05-22 DIAGNOSIS — C25.9 MALIGNANT NEOPLASM OF PANCREAS, UNSPECIFIED LOCATION OF MALIGNANCY: Primary | ICD-10-CM

## 2024-05-22 DIAGNOSIS — C25.9 PANCREATIC ADENOCARCINOMA: ICD-10-CM

## 2024-05-22 PROCEDURE — 63600175 PHARM REV CODE 636 W HCPCS: Performed by: NURSE PRACTITIONER

## 2024-05-22 PROCEDURE — 96375 TX/PRO/DX INJ NEW DRUG ADDON: CPT

## 2024-05-22 PROCEDURE — 96415 CHEMO IV INFUSION ADDL HR: CPT

## 2024-05-22 PROCEDURE — 96368 THER/DIAG CONCURRENT INF: CPT

## 2024-05-22 PROCEDURE — 25000003 PHARM REV CODE 250: Performed by: NURSE PRACTITIONER

## 2024-05-22 PROCEDURE — 96413 CHEMO IV INFUSION 1 HR: CPT

## 2024-05-22 PROCEDURE — 96367 TX/PROPH/DG ADDL SEQ IV INF: CPT

## 2024-05-22 RX ORDER — FLUOROURACIL 50 MG/ML
3900 INJECTION, SOLUTION INTRAVENOUS
Status: CANCELLED | OUTPATIENT
Start: 2024-05-22

## 2024-05-22 RX ORDER — DEXAMETHASONE 4 MG/1
TABLET ORAL
Qty: 4 TABLET | Refills: 0 | Status: SHIPPED | OUTPATIENT
Start: 2024-05-22 | End: 2024-06-19

## 2024-05-22 RX ORDER — EPINEPHRINE 0.3 MG/.3ML
0.3 INJECTION SUBCUTANEOUS ONCE AS NEEDED
Status: DISCONTINUED | OUTPATIENT
Start: 2024-05-22 | End: 2024-05-22 | Stop reason: HOSPADM

## 2024-05-22 RX ORDER — DIPHENHYDRAMINE HYDROCHLORIDE 50 MG/ML
25 INJECTION INTRAMUSCULAR; INTRAVENOUS
Status: COMPLETED | OUTPATIENT
Start: 2024-05-22 | End: 2024-05-22

## 2024-05-22 RX ORDER — HEPARIN 100 UNIT/ML
500 SYRINGE INTRAVENOUS
Status: CANCELLED | OUTPATIENT
Start: 2024-05-23

## 2024-05-22 RX ORDER — PROCHLORPERAZINE EDISYLATE 5 MG/ML
5 INJECTION INTRAMUSCULAR; INTRAVENOUS ONCE AS NEEDED
Status: DISCONTINUED | OUTPATIENT
Start: 2024-05-22 | End: 2024-05-22 | Stop reason: HOSPADM

## 2024-05-22 RX ORDER — DIPHENHYDRAMINE HYDROCHLORIDE 50 MG/ML
50 INJECTION INTRAMUSCULAR; INTRAVENOUS ONCE AS NEEDED
Status: CANCELLED | OUTPATIENT
Start: 2024-05-22 | End: 2035-10-18

## 2024-05-22 RX ORDER — SODIUM CHLORIDE 0.9 % (FLUSH) 0.9 %
10 SYRINGE (ML) INJECTION
Status: CANCELLED | OUTPATIENT
Start: 2024-05-23

## 2024-05-22 RX ORDER — DIPHENHYDRAMINE HYDROCHLORIDE 50 MG/ML
50 INJECTION INTRAMUSCULAR; INTRAVENOUS ONCE AS NEEDED
Status: DISCONTINUED | OUTPATIENT
Start: 2024-05-22 | End: 2024-05-22 | Stop reason: HOSPADM

## 2024-05-22 RX ORDER — SODIUM CHLORIDE 0.9 % (FLUSH) 0.9 %
10 SYRINGE (ML) INJECTION
Status: DISCONTINUED | OUTPATIENT
Start: 2024-05-22 | End: 2024-05-22 | Stop reason: HOSPADM

## 2024-05-22 RX ORDER — FAMOTIDINE 10 MG/ML
20 INJECTION INTRAVENOUS
Status: COMPLETED | OUTPATIENT
Start: 2024-05-22 | End: 2024-05-22

## 2024-05-22 RX ORDER — ATROPINE SULFATE 0.4 MG/ML
0.4 INJECTION, SOLUTION ENDOTRACHEAL; INTRAMEDULLARY; INTRAMUSCULAR; INTRAVENOUS; SUBCUTANEOUS ONCE AS NEEDED
Status: COMPLETED | OUTPATIENT
Start: 2024-05-22 | End: 2024-05-22

## 2024-05-22 RX ORDER — DIPHENHYDRAMINE HYDROCHLORIDE 50 MG/ML
25 INJECTION INTRAMUSCULAR; INTRAVENOUS
Status: CANCELLED | OUTPATIENT
Start: 2024-05-22

## 2024-05-22 RX ORDER — HEPARIN 100 UNIT/ML
500 SYRINGE INTRAVENOUS
Status: DISCONTINUED | OUTPATIENT
Start: 2024-05-22 | End: 2024-05-22 | Stop reason: HOSPADM

## 2024-05-22 RX ORDER — ATROPINE SULFATE 0.4 MG/ML
0.4 INJECTION, SOLUTION ENDOTRACHEAL; INTRAMEDULLARY; INTRAMUSCULAR; INTRAVENOUS; SUBCUTANEOUS ONCE AS NEEDED
Status: CANCELLED | OUTPATIENT
Start: 2024-05-22 | End: 2024-05-22

## 2024-05-22 RX ORDER — EPINEPHRINE 0.3 MG/.3ML
0.3 INJECTION SUBCUTANEOUS ONCE AS NEEDED
Status: CANCELLED | OUTPATIENT
Start: 2024-05-22 | End: 2035-10-18

## 2024-05-22 RX ORDER — FAMOTIDINE 10 MG/ML
20 INJECTION INTRAVENOUS
Status: CANCELLED | OUTPATIENT
Start: 2024-05-22

## 2024-05-22 RX ORDER — PROCHLORPERAZINE EDISYLATE 5 MG/ML
5 INJECTION INTRAMUSCULAR; INTRAVENOUS ONCE AS NEEDED
Status: CANCELLED | OUTPATIENT
Start: 2024-05-22

## 2024-05-22 RX ORDER — SODIUM CHLORIDE 0.9 % (FLUSH) 0.9 %
10 SYRINGE (ML) INJECTION
Status: CANCELLED | OUTPATIENT
Start: 2024-05-22

## 2024-05-22 RX ORDER — HEPARIN 100 UNIT/ML
500 SYRINGE INTRAVENOUS
Status: CANCELLED | OUTPATIENT
Start: 2024-05-22

## 2024-05-22 RX ADMIN — LEUCOVORIN CALCIUM 650 MG: 350 INJECTION, POWDER, LYOPHILIZED, FOR SOLUTION INTRAMUSCULAR; INTRAVENOUS at 02:05

## 2024-05-22 RX ADMIN — FLUOROURACIL 3900 MG: 50 INJECTION, SOLUTION INTRAVENOUS at 04:05

## 2024-05-22 RX ADMIN — DEXAMETHASONE SODIUM PHOSPHATE 0.25 MG: 4 INJECTION, SOLUTION INTRA-ARTICULAR; INTRALESIONAL; INTRAMUSCULAR; INTRAVENOUS; SOFT TISSUE at 01:05

## 2024-05-22 RX ADMIN — FAMOTIDINE 20 MG: 10 INJECTION, SOLUTION INTRAVENOUS at 01:05

## 2024-05-22 RX ADMIN — ATROPINE SULFATE 0.4 MG: 0.4 INJECTION, SOLUTION INTRAVENOUS at 04:05

## 2024-05-22 RX ADMIN — DIPHENHYDRAMINE HYDROCHLORIDE 25 MG: 50 INJECTION INTRAMUSCULAR; INTRAVENOUS at 01:05

## 2024-05-22 RX ADMIN — SODIUM CHLORIDE: 9 INJECTION, SOLUTION INTRAVENOUS at 01:05

## 2024-05-22 RX ADMIN — IRINOTECAN HYDROCHLORIDE 244 MG: 20 INJECTION, SOLUTION INTRAVENOUS at 02:05

## 2024-05-24 ENCOUNTER — INFUSION (OUTPATIENT)
Dept: INFUSION THERAPY | Facility: HOSPITAL | Age: 84
End: 2024-05-24
Attending: INTERNAL MEDICINE
Payer: COMMERCIAL

## 2024-05-24 VITALS
HEIGHT: 62 IN | TEMPERATURE: 98 F | WEIGHT: 141.63 LBS | RESPIRATION RATE: 16 BRPM | OXYGEN SATURATION: 97 % | HEART RATE: 61 BPM | BODY MASS INDEX: 26.06 KG/M2 | SYSTOLIC BLOOD PRESSURE: 122 MMHG | DIASTOLIC BLOOD PRESSURE: 73 MMHG

## 2024-05-24 DIAGNOSIS — C25.9 PANCREATIC ADENOCARCINOMA: Primary | ICD-10-CM

## 2024-05-24 PROCEDURE — 25000003 PHARM REV CODE 250: Performed by: NURSE PRACTITIONER

## 2024-05-24 PROCEDURE — 63600175 PHARM REV CODE 636 W HCPCS: Performed by: NURSE PRACTITIONER

## 2024-05-24 PROCEDURE — 96372 THER/PROPH/DIAG INJ SC/IM: CPT

## 2024-05-24 RX ORDER — HEPARIN 100 UNIT/ML
500 SYRINGE INTRAVENOUS
Status: DISCONTINUED | OUTPATIENT
Start: 2024-05-24 | End: 2024-05-24 | Stop reason: HOSPADM

## 2024-05-24 RX ORDER — SODIUM CHLORIDE 0.9 % (FLUSH) 0.9 %
10 SYRINGE (ML) INJECTION
Status: DISCONTINUED | OUTPATIENT
Start: 2024-05-24 | End: 2024-05-24 | Stop reason: HOSPADM

## 2024-05-24 RX ADMIN — HEPARIN 500 UNITS: 100 SYRINGE at 12:05

## 2024-05-24 RX ADMIN — PEGFILGRASTIM-CBQV 6 MG: 6 INJECTION, SOLUTION SUBCUTANEOUS at 12:05

## 2024-05-24 RX ADMIN — SODIUM CHLORIDE 1000 ML: 9 INJECTION, SOLUTION INTRAVENOUS at 10:05

## 2024-05-31 DIAGNOSIS — C25.9 MALIGNANT NEOPLASM OF PANCREAS, UNSPECIFIED LOCATION OF MALIGNANCY: Primary | ICD-10-CM

## 2024-05-31 DIAGNOSIS — C25.9 PANCREATIC CANCER: ICD-10-CM

## 2024-05-31 RX ORDER — HYDROCODONE BITARTRATE AND ACETAMINOPHEN 500; 5 MG/1; MG/1
TABLET ORAL
OUTPATIENT
Start: 2024-06-24

## 2024-05-31 RX ORDER — METRONIDAZOLE 500 MG/100ML
500 INJECTION, SOLUTION INTRAVENOUS
OUTPATIENT
Start: 2024-06-24 | End: 2024-06-24

## 2024-05-31 RX ORDER — ENOXAPARIN SODIUM 300 MG/3ML
30 INJECTION INTRAVENOUS; SUBCUTANEOUS EVERY 24 HOURS
OUTPATIENT
Start: 2024-05-31

## 2024-05-31 RX ORDER — ALVIMOPAN 12 MG/1
12 CAPSULE ORAL ONCE
OUTPATIENT
Start: 2024-06-24 | End: 2024-06-30

## 2024-06-07 ENCOUNTER — OFFICE VISIT (OUTPATIENT)
Dept: URGENT CARE | Facility: CLINIC | Age: 84
End: 2024-06-07
Payer: COMMERCIAL

## 2024-06-07 VITALS
HEIGHT: 62 IN | BODY MASS INDEX: 25.76 KG/M2 | RESPIRATION RATE: 16 BRPM | DIASTOLIC BLOOD PRESSURE: 74 MMHG | SYSTOLIC BLOOD PRESSURE: 122 MMHG | OXYGEN SATURATION: 98 % | HEART RATE: 76 BPM | TEMPERATURE: 98 F | WEIGHT: 140 LBS

## 2024-06-07 DIAGNOSIS — M25.512 CHRONIC LEFT SHOULDER PAIN: Primary | ICD-10-CM

## 2024-06-07 DIAGNOSIS — M79.18 PAIN OF SHOULDER MUSCLE: ICD-10-CM

## 2024-06-07 DIAGNOSIS — G89.29 CHRONIC LEFT SHOULDER PAIN: Primary | ICD-10-CM

## 2024-06-07 PROCEDURE — 99213 OFFICE O/P EST LOW 20 MIN: CPT | Mod: S$PBB,,,

## 2024-06-07 PROCEDURE — 99215 OFFICE O/P EST HI 40 MIN: CPT | Mod: PBBFAC

## 2024-06-07 RX ORDER — BACLOFEN 5 MG/1
5 TABLET ORAL 3 TIMES DAILY PRN
Qty: 15 TABLET | Refills: 0 | Status: SHIPPED | OUTPATIENT
Start: 2024-06-07 | End: 2024-06-12

## 2024-06-07 RX ORDER — DICLOFENAC SODIUM 10 MG/G
2 GEL TOPICAL DAILY PRN
Qty: 100 G | Refills: 0 | Status: SHIPPED | OUTPATIENT
Start: 2024-06-07

## 2024-06-12 ENCOUNTER — HOSPITAL ENCOUNTER (OUTPATIENT)
Dept: RADIOLOGY | Facility: HOSPITAL | Age: 84
Discharge: HOME | End: 2024-06-12
Attending: SURGERY
Payer: COMMERCIAL

## 2024-06-12 DIAGNOSIS — C25.9 MALIGNANT NEOPLASM OF PANCREAS, UNSPECIFIED LOCATION OF MALIGNANCY: ICD-10-CM

## 2024-06-12 LAB
CREAT SERPL-MCNC: 0.8 MG/DL (ref 0.5–1.4)
SAMPLE: NORMAL

## 2024-06-12 PROCEDURE — 74177 CT ABD & PELVIS W/CONTRAST: CPT | Mod: TC

## 2024-06-12 PROCEDURE — 25500020 PHARM REV CODE 255: Performed by: SURGERY

## 2024-06-12 RX ADMIN — IOHEXOL 100 ML: 350 INJECTION, SOLUTION INTRAVENOUS at 07:06

## 2024-06-12 RX ADMIN — DIATRIZOATE MEGLUMINE AND DIATRIZOATE SODIUM 30 ML: 600; 100 SOLUTION ORAL; RECTAL at 07:06

## 2024-06-14 ENCOUNTER — ANESTHESIA EVENT (OUTPATIENT)
Dept: SURGERY | Facility: HOSPITAL | Age: 84
End: 2024-06-14
Payer: COMMERCIAL

## 2024-06-14 NOTE — CLINICAL REVIEW
labs/CTChest reviewed. outdated EKG reviewed. cardiology notes utd. Echo/Carotid noted. CRA provided. chart review complete. ccv

## 2024-06-19 ENCOUNTER — OFFICE VISIT (OUTPATIENT)
Dept: SURGICAL ONCOLOGY | Facility: CLINIC | Age: 84
End: 2024-06-19
Payer: COMMERCIAL

## 2024-06-19 VITALS
BODY MASS INDEX: 26.05 KG/M2 | HEART RATE: 72 BPM | SYSTOLIC BLOOD PRESSURE: 134 MMHG | WEIGHT: 142.38 LBS | DIASTOLIC BLOOD PRESSURE: 87 MMHG

## 2024-06-19 DIAGNOSIS — C25.9 MALIGNANT NEOPLASM OF PANCREAS, UNSPECIFIED LOCATION OF MALIGNANCY: Primary | ICD-10-CM

## 2024-06-19 PROCEDURE — 99999 PR PBB SHADOW E&M-EST. PATIENT-LVL III: CPT | Mod: PBBFAC,,, | Performed by: SURGERY

## 2024-06-19 PROCEDURE — 3288F FALL RISK ASSESSMENT DOCD: CPT | Mod: CPTII,S$GLB,, | Performed by: SURGERY

## 2024-06-19 PROCEDURE — 1159F MED LIST DOCD IN RCRD: CPT | Mod: CPTII,S$GLB,, | Performed by: SURGERY

## 2024-06-19 PROCEDURE — 3079F DIAST BP 80-89 MM HG: CPT | Mod: CPTII,S$GLB,, | Performed by: SURGERY

## 2024-06-19 PROCEDURE — 1101F PT FALLS ASSESS-DOCD LE1/YR: CPT | Mod: CPTII,S$GLB,, | Performed by: SURGERY

## 2024-06-19 PROCEDURE — 99213 OFFICE O/P EST LOW 20 MIN: CPT | Mod: S$GLB,,, | Performed by: SURGERY

## 2024-06-19 PROCEDURE — 3075F SYST BP GE 130 - 139MM HG: CPT | Mod: CPTII,S$GLB,, | Performed by: SURGERY

## 2024-06-19 NOTE — PROGRESS NOTES
History & Physical    CHIEF COMPLAINT:  PANCREATIC CANCER     History of Present Illness:  83 year-old-female referred by Dr. Erika Ulloa.  Patient was sent to the ER by her nephrologist for elevated liver enzymes and bilirubin.  In the ER, , , alkaline phos 1113, bilirubin 2.3; hepatitis panel negative, lipase normal; CEA 1.96,  2461.89.  CT abd/pel showed suspected obstructive pancreatic head mass measuring 2.2 cm with moderate dilation of the biliary ductal system and severe dilation of the pancreatic duct; possible regional enlarged lymph nodes in the periportal region.  ERCP performed, a single biliary stricture was found in the lower third of the main bile duct; the upper third of the main bile duct and middle third of the main bile duct were moderately dilated, with a mass causing an obstruction; biliary sphincterotomy was performed; cells for cytology obtained in the lower third of the main duct; One uncovered metal biliary stent was placed into the common bile duct.  Cytology revealed adenocarcinoma.  Patient has been seen by Dr. Forman.  PET scan performed 10/5/23 showed no evidence of metastatic disease.  She underwent diagnostic laparoscopy at the time of her MediPort placement that showed no evidence of occult metastatic disease therefore we proceed with treatment with curative intent.  She completed FOLFIRINOX neoadjuvant therapy on March 21, 2024 and tolerated this very well.  She maintains an excellent functional status for her age.    She presents today without complaint.  Restaging imaging was performed and I personally reviewed and interpreted the images and report.  There was no evidence of progression or metastatic disease.  CA 19 9 has responded dramatically down to 77    She presents after cardiac evaluation which was satisfactory.  She has no new complaints today.        Greater than 30 minutes was required for complete chart review, imaging review including  interpretation of imaging, coordination with referring/other physicians, patient counseling regarding diagnosis/treatment plan, answering questions, medical decision making, surgical scheduling/coordination and documentation.    Visit today included increased complexity associated with the care of the episodic problem pancreatic cancer addressed and managing the longitudinal care of the patient due to the serious and/or complex managed problem(s) pancreatic cancer.    Review of patient's allergies indicates:  No Known Allergies    Current Outpatient Medications   Medication Sig Dispense Refill    gabapentin (NEURONTIN) 300 MG capsule Take 300-600 mg by mouth every evening.      lipase-protease-amylase (CREON) 36,000-114,000- 180,000 unit CpDR Take one capsule po tid with meals (Patient taking differently: Take 1 capsule by mouth 3 (three) times daily. Take one capsule po tid with meals) 50 capsule 6    lisinopriL-hydrochlorothiazide (PRINZIDE,ZESTORETIC) 10-12.5 mg per tablet Take 1 tablet by mouth once daily.      metFORMIN (GLUCOPHAGE) 500 MG tablet Take 500 mg by mouth once daily.      mirtazapine (REMERON) 7.5 MG Tab Take 1 tablet (7.5 mg total) by mouth every evening. 30 tablet 3    potassium chloride (KLOR-CON) 10 MEQ TbSR Take 1 tablet (10 mEq total) by mouth 2 (two) times daily. 60 tablet 3    potassium chloride SA (K-DUR,KLOR-CON M) 10 MEQ tablet TAKE ONE TABLET BY MOUTH TWICE A DAY 60 tablet 1    prochlorperazine (COMPAZINE) 5 MG tablet Take 1 tablet (5 mg total) by mouth every 6 (six) hours as needed for Nausea. 20 tablet 5    PROCTO-MED HC 2.5 % rectal cream       triamcinolone acetonide 0.025% (KENALOG) 0.025 % cream       valACYclovir (VALTREX) 1000 MG tablet Take 1 tablet (1,000 mg total) by mouth 2 (two) times daily. 14 tablet 0    aspirin (ECOTRIN) 81 MG EC tablet Take 1 tablet (81 mg total) by mouth once daily. 30 tablet 11    diclofenac sodium (VOLTAREN) 1 % Gel Apply 2 g topically daily as needed  (shoulder pain). 100 g 0    ergocalciferol (ERGOCALCIFEROL) 50,000 unit Cap Take 50,000 Units by mouth every 7 days.      gabapentin (NEURONTIN) 100 MG capsule Take 1 capsule (100 mg total) by mouth every evening. (Patient taking differently: Take 100 mg by mouth every evening. neuropathy) 30 capsule 11    loperamide (IMODIUM) 2 mg capsule Take 2 capsules by mouth at the onset of diarrhea then 1 capsule every 2 hours until diarrhea free for 12 hours. (Patient not taking: Reported on 6/7/2024) 30 capsule 11    mupirocin (BACTROBAN) 2 % ointment mupirocin 2 % topical ointment (Patient not taking: Reported on 6/7/2024)      ondansetron (ZOFRAN-ODT) 8 MG TbDL Take 1 tablet (8 mg total) by mouth every 6 (six) hours as needed (Nausea). (Patient not taking: Reported on 6/7/2024) 60 tablet 1    simvastatin (ZOCOR) 40 MG tablet  (Patient not taking: Reported on 6/7/2024)      simvastatin (ZOCOR) 40 MG tablet Take 40 mg by mouth once daily. (Patient not taking: Reported on 6/7/2024)       No current facility-administered medications for this visit.       Past Medical History:   Diagnosis Date    Arthritis     Diabetes mellitus     Hyperlipidemia     Hypertension     Kidney cysts     Neuropathy     Osteoporosis     Pancreatic cancer     Taste sense altered     Weight loss, unintentional      Past Surgical History:   Procedure Laterality Date    CATARACT EXTRACTION Bilateral     COLONOSCOPY      DIAGNOSTIC LAPAROSCOPY N/A 10/09/2023    Procedure: LAPAROSCOPY, DIAGNOSTIC  Req 12/12:30;  Surgeon: Chucky Olguin MD;  Location: [facility] OR;  Service: General;  Laterality: N/A;    ERCP N/A 09/13/2023    Procedure: ERCP (ENDOSCOPIC RETROGRADE CHOLANGIOPANCREATOGRAPHY)/ cytology brushing;  Surgeon: Erika Ulloa MD;  Location: [facility] ENDOSCOPY;  Service: Gastroenterology;  Laterality: N/A;    ERCP W/ SPHICTEROTOMY  09/13/2023    Procedure: ERCP, WITH SPHINCTEROTOMY;  Surgeon: Erika Ulloa MD;  Location: [facility] ENDOSCOPY;   Service: Gastroenterology;;    ERCP, WITH STENT INSERTION  09/13/2023    Procedure: ERCP, WITH STENT INSERTION;  Surgeon: Erika Ulloa MD;  Location: [facility] ENDOSCOPY;  Service: Gastroenterology;;    ESOPHAGOGASTRODUODENOSCOPY N/A 09/13/2023    Procedure: EGD (ESOPHAGOGASTRODUODENOSCOPY);  Surgeon: Erika Ulloa MD;  Location: [facility] ENDOSCOPY;  Service: Gastroenterology;  Laterality: N/A;    PLACEMENT, MEDIPORT Right 10/09/2023    Procedure: PLACEMENT, MEDIPORT;  Surgeon: Chucky Olguin MD;  Location: [facility] OR;  Service: General;  Laterality: Right;    POLYPECTOMY      uterus and colon     Family History   Problem Relation Name Age of Onset    Leukemia Father      Prostate cancer Brother      Breast cancer Other niece 48    Breast cancer Maternal Aunt      Breast cancer Maternal Aunt      Breast cancer Maternal Aunt      Cancer Paternal Uncle       Social History     Tobacco Use    Smoking status: Former     Types: Cigarettes    Smokeless tobacco: Never    Tobacco comments:     Quit over 25 years ago.    Substance Use Topics    Alcohol use: Never    Drug use: Never        Review of Systems:  Review of Systems   Constitutional:  Negative for appetite change, chills, diaphoresis and fever.   HENT:  Negative for congestion, drooling, ear discharge, ear pain and hearing loss.    Eyes:  Negative for discharge.   Respiratory:  Negative for apnea, cough, choking, chest tightness, shortness of breath and stridor.    Cardiovascular:  Negative for chest pain, palpitations and leg swelling.   Endocrine: Negative for cold intolerance and heat intolerance.   Genitourinary:  Negative for difficulty urinating, dyspareunia, dysuria and hematuria.   Musculoskeletal:  Negative for arthralgias, gait problem and joint swelling.   Skin:  Negative for color change and rash.   Neurological:  Negative for dizziness, tremors, seizures, syncope, facial asymmetry, speech difficulty, light-headedness, numbness and headaches.    Psychiatric/Behavioral:  Negative for agitation and confusion.        OBJECTIVE:     Vital Signs (Most Recent)  Pulse: 72 (06/19/24 1354)  BP: 134/87 (06/19/24 1355)     64.6 kg (142 lb 6.4 oz)     Physical Exam:  Physical Exam  Constitutional:       General: She is not in acute distress.     Appearance: Normal appearance. She is not toxic-appearing.   HENT:      Head: Normocephalic and atraumatic.      Right Ear: External ear normal.      Left Ear: External ear normal.      Mouth/Throat:      Mouth: Mucous membranes are moist.   Eyes:      General: No scleral icterus.     Conjunctiva/sclera: Conjunctivae normal.      Pupils: Pupils are equal, round, and reactive to light.   Cardiovascular:      Rate and Rhythm: Normal rate and regular rhythm.      Pulses: Normal pulses.   Pulmonary:      Effort: Pulmonary effort is normal. No respiratory distress.      Breath sounds: Normal breath sounds. No stridor. No wheezing or rhonchi.   Abdominal:      General: Abdomen is flat. There is no distension.      Palpations: Abdomen is soft. There is no mass.      Tenderness: There is no abdominal tenderness. There is no guarding or rebound.      Hernia: No hernia is present.   Musculoskeletal:         General: No swelling or tenderness. Normal range of motion.      Cervical back: Normal range of motion and neck supple.      Right lower leg: No edema.      Left lower leg: No edema.   Skin:     General: Skin is warm.      Capillary Refill: Capillary refill takes less than 2 seconds.      Coloration: Skin is not jaundiced or pale.      Findings: No erythema or rash.   Neurological:      General: No focal deficit present.      Mental Status: She is alert and oriented to person, place, and time.      Gait: Gait normal.   Psychiatric:         Mood and Affect: Mood normal.         Behavior: Behavior normal.         Judgment: Judgment normal.         ASSESSMENT/PLAN:     Adenocarcinoma of the pancreatic head, borderline resectable with  evidence of regional lymphadenopathy    Diagnosis, staging, prognosis and treatment guidelines for pancreatic cancer were again discussed with the patient in detail, all questions were addressed.  She is completed neoadjuvant FOLFIRINOX and done very well, CA 19 9 has responded significantly and no progression of disease on restaging imaging.    We again discussed the risks and benefits of proceeding with aggressive surgical intervention.  She is interested in pursuing this, and given her excellent functional status for her age I think she has a surgical candidate.  We again discussed the magnitude of the surgery in detail.  Her questions were answered in full.    Preoperative cardiac evaluation satisfactory cleared at low risk with no further intervention or workup    Proceed with laparoscopic/robotic pancreaticoduodenectomy/Whipple procedure    The risks and benefits of the procedure were explained in detail using layman terms, including the pros and cons of any implant that may be used, all questions were addressed, the patient gives consent to proceed    Chucky Olguin MD  Surgical Oncology  Complex General, Gastrointestinal and Hepatobiliary Surgery

## 2024-06-21 NOTE — PRE-PROCEDURE INSTRUCTIONS
"[facility]: Outpatient Surgery  Preprocedure Instructions    Expectations: "Because of inconsistent procedure completion times, an unexpected wait may occur. The physicians would like you to be here to prepare in the event they run ahead of time. We will make you as comfortable as possible and keep you informed. We apologize in advance if this happens."     Your arrival time for your surgery or procedure is _6 AM_____.  We ask patients to arrive about 2 hours before surgery to allow for enough time to review your health history & medications, start your IV, complete any outstanding labwork or tests, and meet your Anesthesiologist.    We are located at [facility], [address] [place] [address].    Enter through the [place] entrance next to the Emergency Room, and come to the 6th floor to the Outpatient Surgery Department.    If you are in need of a wheelchair the morning of surgery please call [phone] about 15 minutes before you arrive. Parking is available in our parking garage [place] [facility] [facility] the hospital and [facility].      Visitory Policy:  You are allowed 2 adult visitors to be with you in the hospital. Please, no switching visitors in pre-op area. All hospital visitors should be in good current health.  No small children.  We will update you and your family hourly on the progression of surgery and any unexpected delays.      What to Bring:  Please have your ID, insurance cards, and all home medication bottles with you at check in.  Bring your CPAP machine if one is used at home.     Fasting:  Nothing to eat or drink after midnight the night before your procedure. This includes no ice, gum, hard candies, and/or tobacco products.    Medications:  Follow your doctor's instructions for taking any medications on the morning of your procedure.  If no instructions for taking medications were given, do not take any medications but bring your " medications in their bottles to your procedure check in.     Follow your doctor's preoperative instructions regarding skin prep, bowel prep, bathing, or showering prior to your procedure.  If any special soaps were provided to you, please use according to your doctor's instructions. If no instructions were given from your doctor, take a good bath or shower with antibacterial soap the night before and the morning of your procedure.  On the morning of procedure, wear loose, comfortable clothing.  No lotions, makeup, perfumes, colognes, deodorant, or jewelry to your procedure.  Removable items (glasses, contact lenses, dentures, retainers, hearing aids) need to be removed for your procedure.  Bring your storage containers for these items if you wear them.     Artificial nails, body jewelry, eyelash extensions, and/or hair extensions with metal clips are not allowed during your surgery.  If you currently wear any of these items, please arrange for them to be removed prior to your arrival to the hospital.     Outpatient or Same Day Surgeries:  Any patients receiving sedation/anesthesia are advised not to drive for 24 hours after their procedure.  We do not allow patients to drive themselves home after discharge.  If you are going home after your procedure, please have someone available to drive you home from the hospital.     You may call the Outpatient Surgery Department at [phone] with any questions or concerns.  We are looking forward to meeting you and taking great care of you for your procedure.  Thank you for choosing [facility] for your surgical needs.

## 2024-06-24 ENCOUNTER — ANESTHESIA (OUTPATIENT)
Dept: SURGERY | Facility: HOSPITAL | Age: 84
End: 2024-06-24
Payer: COMMERCIAL

## 2024-06-24 ENCOUNTER — HOSPITAL ENCOUNTER (INPATIENT)
Facility: HOSPITAL | Age: 84
LOS: 4 days | Discharge: HOME | DRG: 405 | End: 2024-06-28
Attending: SURGERY | Admitting: SURGERY
Payer: COMMERCIAL

## 2024-06-24 DIAGNOSIS — C25.9 MALIGNANT NEOPLASM OF PANCREAS, UNSPECIFIED LOCATION OF MALIGNANCY: ICD-10-CM

## 2024-06-24 DIAGNOSIS — C25.9 PANCREATIC CANCER: ICD-10-CM

## 2024-06-24 LAB
EST. AVERAGE GLUCOSE BLD GHB EST-MCNC: 125.5 MG/DL
GROUP & RH: NORMAL
HBA1C MFR BLD: 6 %
INDIRECT COOMBS: NORMAL
POCT GLUCOSE: 112 MG/DL (ref 70–110)
SPECIMEN OUTDATE: NORMAL

## 2024-06-24 PROCEDURE — 36415 COLL VENOUS BLD VENIPUNCTURE: CPT | Performed by: NURSE PRACTITIONER

## 2024-06-24 PROCEDURE — 63600175 PHARM REV CODE 636 W HCPCS: Performed by: NURSE PRACTITIONER

## 2024-06-24 PROCEDURE — C9290 INJ, BUPIVACAINE LIPOSOME: HCPCS | Performed by: SURGERY

## 2024-06-24 PROCEDURE — 0FB14ZX EXCISION OF RIGHT LOBE LIVER, PERCUTANEOUS ENDOSCOPIC APPROACH, DIAGNOSTIC: ICD-10-PCS | Performed by: SURGERY

## 2024-06-24 PROCEDURE — 88304 TISSUE EXAM BY PATHOLOGIST: CPT | Performed by: SURGERY

## 2024-06-24 PROCEDURE — 25000003 PHARM REV CODE 250: Performed by: NURSE ANESTHETIST, CERTIFIED REGISTERED

## 2024-06-24 PROCEDURE — 25000003 PHARM REV CODE 250: Performed by: SURGERY

## 2024-06-24 PROCEDURE — 88331 PATH CONSLTJ SURG 1 BLK 1SPC: CPT

## 2024-06-24 PROCEDURE — 07BD4ZZ EXCISION OF AORTIC LYMPHATIC, PERCUTANEOUS ENDOSCOPIC APPROACH: ICD-10-PCS | Performed by: SURGERY

## 2024-06-24 PROCEDURE — 88309 TISSUE EXAM BY PATHOLOGIST: CPT

## 2024-06-24 PROCEDURE — 8E0W4CZ ROBOTIC ASSISTED PROCEDURE OF TRUNK REGION, PERCUTANEOUS ENDOSCOPIC APPROACH: ICD-10-PCS | Performed by: SURGERY

## 2024-06-24 PROCEDURE — 71000039 HC RECOVERY, EACH ADD'L HOUR: Performed by: SURGERY

## 2024-06-24 PROCEDURE — 36415 COLL VENOUS BLD VENIPUNCTURE: CPT | Performed by: SURGERY

## 2024-06-24 PROCEDURE — 88307 TISSUE EXAM BY PATHOLOGIST: CPT

## 2024-06-24 PROCEDURE — 82962 GLUCOSE BLOOD TEST: CPT | Performed by: SURGERY

## 2024-06-24 PROCEDURE — 71000015 HC POSTOP RECOV 1ST HR: Performed by: SURGERY

## 2024-06-24 PROCEDURE — 88305 TISSUE EXAM BY PATHOLOGIST: CPT

## 2024-06-24 PROCEDURE — 27201423 OPTIME MED/SURG SUP & DEVICES STERILE SUPPLY: Performed by: SURGERY

## 2024-06-24 PROCEDURE — 37000008 HC ANESTHESIA 1ST 15 MINUTES: Performed by: SURGERY

## 2024-06-24 PROCEDURE — 63600175 PHARM REV CODE 636 W HCPCS: Performed by: SURGERY

## 2024-06-24 PROCEDURE — 63600175 PHARM REV CODE 636 W HCPCS: Performed by: NURSE ANESTHETIST, CERTIFIED REGISTERED

## 2024-06-24 PROCEDURE — 11000001 HC ACUTE MED/SURG PRIVATE ROOM

## 2024-06-24 PROCEDURE — 0DXU4ZW TRANSFER OMENTUM TO ABDOMINAL REGION, PERCUTANEOUS ENDOSCOPIC APPROACH: ICD-10-PCS | Performed by: SURGERY

## 2024-06-24 PROCEDURE — 83036 HEMOGLOBIN GLYCOSYLATED A1C: CPT | Performed by: NURSE PRACTITIONER

## 2024-06-24 PROCEDURE — 0FT44ZZ RESECTION OF GALLBLADDER, PERCUTANEOUS ENDOSCOPIC APPROACH: ICD-10-PCS | Performed by: SURGERY

## 2024-06-24 PROCEDURE — 0FBG4ZZ EXCISION OF PANCREAS, PERCUTANEOUS ENDOSCOPIC APPROACH: ICD-10-PCS | Performed by: SURGERY

## 2024-06-24 PROCEDURE — 36000713 HC OR TIME LEV V EA ADD 15 MIN: Performed by: SURGERY

## 2024-06-24 PROCEDURE — 0DT94ZZ RESECTION OF DUODENUM, PERCUTANEOUS ENDOSCOPIC APPROACH: ICD-10-PCS | Performed by: SURGERY

## 2024-06-24 PROCEDURE — C1729 CATH, DRAINAGE: HCPCS | Performed by: SURGERY

## 2024-06-24 PROCEDURE — 37000009 HC ANESTHESIA EA ADD 15 MINS: Performed by: SURGERY

## 2024-06-24 PROCEDURE — 86850 RBC ANTIBODY SCREEN: CPT | Performed by: SURGERY

## 2024-06-24 PROCEDURE — A4216 STERILE WATER/SALINE, 10 ML: HCPCS | Performed by: SURGERY

## 2024-06-24 PROCEDURE — 0FJB4ZZ INSPECTION OF HEPATOBILIARY DUCT, PERCUTANEOUS ENDOSCOPIC APPROACH: ICD-10-PCS | Performed by: SURGERY

## 2024-06-24 PROCEDURE — 25000242 PHARM REV CODE 250 ALT 637 W/ HCPCS: Performed by: NURSE ANESTHETIST, CERTIFIED REGISTERED

## 2024-06-24 PROCEDURE — 25000003 PHARM REV CODE 250: Performed by: NURSE PRACTITIONER

## 2024-06-24 PROCEDURE — 71000033 HC RECOVERY, INTIAL HOUR: Performed by: SURGERY

## 2024-06-24 PROCEDURE — 36000712 HC OR TIME LEV V 1ST 15 MIN: Performed by: SURGERY

## 2024-06-24 RX ORDER — ALVIMOPAN 12 MG/1
12 CAPSULE ORAL 2 TIMES DAILY
Status: DISCONTINUED | OUTPATIENT
Start: 2024-06-24 | End: 2024-06-28 | Stop reason: HOSPADM

## 2024-06-24 RX ORDER — HYDROMORPHONE HYDROCHLORIDE 2 MG/ML
1 INJECTION, SOLUTION INTRAMUSCULAR; INTRAVENOUS; SUBCUTANEOUS
Status: DISCONTINUED | OUTPATIENT
Start: 2024-06-24 | End: 2024-06-28 | Stop reason: HOSPADM

## 2024-06-24 RX ORDER — EPHEDRINE SULFATE 50 MG/ML
INJECTION, SOLUTION INTRAVENOUS
Status: DISCONTINUED | OUTPATIENT
Start: 2024-06-24 | End: 2024-06-24

## 2024-06-24 RX ORDER — IBUPROFEN 200 MG
24 TABLET ORAL
Status: DISCONTINUED | OUTPATIENT
Start: 2024-06-24 | End: 2024-06-28 | Stop reason: HOSPADM

## 2024-06-24 RX ORDER — ONDANSETRON HYDROCHLORIDE 2 MG/ML
INJECTION, SOLUTION INTRAVENOUS
Status: DISCONTINUED | OUTPATIENT
Start: 2024-06-24 | End: 2024-06-24

## 2024-06-24 RX ORDER — ALBUTEROL SULFATE 90 UG/1
AEROSOL, METERED RESPIRATORY (INHALATION)
Status: DISCONTINUED | OUTPATIENT
Start: 2024-06-24 | End: 2024-06-24

## 2024-06-24 RX ORDER — LIDOCAINE HYDROCHLORIDE 20 MG/ML
INJECTION, SOLUTION EPIDURAL; INFILTRATION; INTRACAUDAL; PERINEURAL
Status: DISCONTINUED | OUTPATIENT
Start: 2024-06-24 | End: 2024-06-24

## 2024-06-24 RX ORDER — HYDROCODONE BITARTRATE AND ACETAMINOPHEN 500; 5 MG/1; MG/1
TABLET ORAL
Status: DISCONTINUED | OUTPATIENT
Start: 2024-06-24 | End: 2024-06-24 | Stop reason: HOSPADM

## 2024-06-24 RX ORDER — GLUCAGON 1 MG
1 KIT INJECTION
Status: DISCONTINUED | OUTPATIENT
Start: 2024-06-24 | End: 2024-06-28 | Stop reason: HOSPADM

## 2024-06-24 RX ORDER — PROMETHAZINE HYDROCHLORIDE 25 MG/1
25 TABLET ORAL EVERY 6 HOURS PRN
Status: DISCONTINUED | OUTPATIENT
Start: 2024-06-24 | End: 2024-06-28 | Stop reason: HOSPADM

## 2024-06-24 RX ORDER — IBUPROFEN 200 MG
16 TABLET ORAL
Status: DISCONTINUED | OUTPATIENT
Start: 2024-06-24 | End: 2024-06-28 | Stop reason: HOSPADM

## 2024-06-24 RX ORDER — ACETAMINOPHEN 10 MG/ML
INJECTION, SOLUTION INTRAVENOUS
Status: DISCONTINUED | OUTPATIENT
Start: 2024-06-24 | End: 2024-06-24

## 2024-06-24 RX ORDER — ACETAMINOPHEN 10 MG/ML
1000 INJECTION, SOLUTION INTRAVENOUS EVERY 8 HOURS
Status: DISPENSED | OUTPATIENT
Start: 2024-06-24 | End: 2024-06-25

## 2024-06-24 RX ORDER — SODIUM CHLORIDE 0.9 % (FLUSH) 0.9 %
SYRINGE (ML) INJECTION
Status: DISCONTINUED | OUTPATIENT
Start: 2024-06-24 | End: 2024-06-24 | Stop reason: HOSPADM

## 2024-06-24 RX ORDER — HYDROMORPHONE HYDROCHLORIDE 2 MG/ML
1 INJECTION, SOLUTION INTRAMUSCULAR; INTRAVENOUS; SUBCUTANEOUS EVERY 4 HOURS PRN
Status: DISCONTINUED | OUTPATIENT
Start: 2024-06-24 | End: 2024-06-28 | Stop reason: HOSPADM

## 2024-06-24 RX ORDER — FENTANYL CITRATE 50 UG/ML
INJECTION, SOLUTION INTRAMUSCULAR; INTRAVENOUS
Status: DISCONTINUED | OUTPATIENT
Start: 2024-06-24 | End: 2024-06-24

## 2024-06-24 RX ORDER — ALVIMOPAN 12 MG/1
12 CAPSULE ORAL ONCE
Status: COMPLETED | OUTPATIENT
Start: 2024-06-24 | End: 2024-06-24

## 2024-06-24 RX ORDER — DIPHENHYDRAMINE HYDROCHLORIDE 50 MG/ML
12.5 INJECTION INTRAMUSCULAR; INTRAVENOUS EVERY 6 HOURS PRN
Status: DISCONTINUED | OUTPATIENT
Start: 2024-06-24 | End: 2024-06-28 | Stop reason: HOSPADM

## 2024-06-24 RX ORDER — PROPOFOL 10 MG/ML
VIAL (ML) INTRAVENOUS
Status: DISCONTINUED | OUTPATIENT
Start: 2024-06-24 | End: 2024-06-24

## 2024-06-24 RX ORDER — INSULIN ASPART 100 [IU]/ML
0-5 INJECTION, SOLUTION INTRAVENOUS; SUBCUTANEOUS
Status: DISCONTINUED | OUTPATIENT
Start: 2024-06-24 | End: 2024-06-28 | Stop reason: HOSPADM

## 2024-06-24 RX ORDER — ENOXAPARIN SODIUM 100 MG/ML
30 INJECTION SUBCUTANEOUS EVERY 24 HOURS
Status: DISCONTINUED | OUTPATIENT
Start: 2024-06-24 | End: 2024-06-24

## 2024-06-24 RX ORDER — ONDANSETRON HYDROCHLORIDE 2 MG/ML
8 INJECTION, SOLUTION INTRAVENOUS EVERY 8 HOURS PRN
Status: DISCONTINUED | OUTPATIENT
Start: 2024-06-24 | End: 2024-06-28 | Stop reason: HOSPADM

## 2024-06-24 RX ORDER — METRONIDAZOLE 500 MG/100ML
500 INJECTION, SOLUTION INTRAVENOUS
Status: DISCONTINUED | OUTPATIENT
Start: 2024-06-24 | End: 2024-06-24 | Stop reason: RX

## 2024-06-24 RX ORDER — PHENYLEPHRINE HYDROCHLORIDE 10 MG/ML
INJECTION INTRAVENOUS
Status: DISCONTINUED | OUTPATIENT
Start: 2024-06-24 | End: 2024-06-24

## 2024-06-24 RX ORDER — ENOXAPARIN SODIUM 100 MG/ML
40 INJECTION SUBCUTANEOUS EVERY 24 HOURS
Status: DISCONTINUED | OUTPATIENT
Start: 2024-06-25 | End: 2024-06-28 | Stop reason: HOSPADM

## 2024-06-24 RX ORDER — DIPHENHYDRAMINE HYDROCHLORIDE 50 MG/ML
12.5 INJECTION INTRAMUSCULAR; INTRAVENOUS EVERY 4 HOURS PRN
Status: DISCONTINUED | OUTPATIENT
Start: 2024-06-24 | End: 2024-06-24 | Stop reason: SDUPTHER

## 2024-06-24 RX ORDER — BUPIVACAINE HYDROCHLORIDE 5 MG/ML
INJECTION, SOLUTION EPIDURAL; INTRACAUDAL
Status: DISCONTINUED | OUTPATIENT
Start: 2024-06-24 | End: 2024-06-24 | Stop reason: HOSPADM

## 2024-06-24 RX ORDER — ROCURONIUM BROMIDE 10 MG/ML
INJECTION, SOLUTION INTRAVENOUS
Status: DISCONTINUED | OUTPATIENT
Start: 2024-06-24 | End: 2024-06-24

## 2024-06-24 RX ORDER — NALOXONE HCL 0.4 MG/ML
0.02 VIAL (ML) INJECTION
Status: DISCONTINUED | OUTPATIENT
Start: 2024-06-24 | End: 2024-06-28 | Stop reason: HOSPADM

## 2024-06-24 RX ORDER — OXYCODONE HYDROCHLORIDE 5 MG/1
5 TABLET ORAL EVERY 4 HOURS PRN
Status: DISCONTINUED | OUTPATIENT
Start: 2024-06-24 | End: 2024-06-28 | Stop reason: HOSPADM

## 2024-06-24 RX ORDER — SODIUM CHLORIDE, SODIUM LACTATE, POTASSIUM CHLORIDE, CALCIUM CHLORIDE 600; 310; 30; 20 MG/100ML; MG/100ML; MG/100ML; MG/100ML
INJECTION, SOLUTION INTRAVENOUS CONTINUOUS
Status: DISCONTINUED | OUTPATIENT
Start: 2024-06-24 | End: 2024-06-26

## 2024-06-24 RX ORDER — DEXAMETHASONE SODIUM PHOSPHATE 4 MG/ML
INJECTION, SOLUTION INTRA-ARTICULAR; INTRALESIONAL; INTRAMUSCULAR; INTRAVENOUS; SOFT TISSUE
Status: DISCONTINUED | OUTPATIENT
Start: 2024-06-24 | End: 2024-06-24

## 2024-06-24 RX ADMIN — ROCURONIUM BROMIDE 10 MG: 10 SOLUTION INTRAVENOUS at 12:06

## 2024-06-24 RX ADMIN — EPHEDRINE SULFATE 10 MG: 50 INJECTION INTRAVENOUS at 08:06

## 2024-06-24 RX ADMIN — PROPOFOL 50 MG: 10 INJECTION, EMULSION INTRAVENOUS at 07:06

## 2024-06-24 RX ADMIN — PHENYLEPHRINE HYDROCHLORIDE 25 MCG/MIN: 10 INJECTION INTRAVENOUS at 08:06

## 2024-06-24 RX ADMIN — SUGAMMADEX 200 MG: 100 INJECTION, SOLUTION INTRAVENOUS at 01:06

## 2024-06-24 RX ADMIN — ALBUTEROL SULFATE 6 PUFF: 90 AEROSOL, METERED RESPIRATORY (INHALATION) at 09:06

## 2024-06-24 RX ADMIN — ENOXAPARIN SODIUM 30 MG: 30 INJECTION SUBCUTANEOUS at 06:06

## 2024-06-24 RX ADMIN — PHENYLEPHRINE HYDROCHLORIDE 100 MCG: 10 INJECTION INTRAVENOUS at 08:06

## 2024-06-24 RX ADMIN — PHENYLEPHRINE HYDROCHLORIDE 200 MCG: 10 INJECTION INTRAVENOUS at 08:06

## 2024-06-24 RX ADMIN — SODIUM CHLORIDE, SODIUM GLUCONATE, SODIUM ACETATE, POTASSIUM CHLORIDE AND MAGNESIUM CHLORIDE: 526; 502; 368; 37; 30 INJECTION, SOLUTION INTRAVENOUS at 07:06

## 2024-06-24 RX ADMIN — PROPOFOL 50 MG: 10 INJECTION, EMULSION INTRAVENOUS at 11:06

## 2024-06-24 RX ADMIN — FENTANYL CITRATE 50 MCG: 50 INJECTION, SOLUTION INTRAMUSCULAR; INTRAVENOUS at 01:06

## 2024-06-24 RX ADMIN — OXYCODONE HYDROCHLORIDE 5 MG: 5 TABLET ORAL at 11:06

## 2024-06-24 RX ADMIN — ROCURONIUM BROMIDE 20 MG: 10 SOLUTION INTRAVENOUS at 08:06

## 2024-06-24 RX ADMIN — HYDROMORPHONE HYDROCHLORIDE 1 MG: 2 INJECTION INTRAMUSCULAR; INTRAVENOUS; SUBCUTANEOUS at 05:06

## 2024-06-24 RX ADMIN — ACETAMINOPHEN 1000 MG: 10 INJECTION, SOLUTION INTRAVENOUS at 11:06

## 2024-06-24 RX ADMIN — PHENYLEPHRINE HYDROCHLORIDE 100 MCG: 10 INJECTION INTRAVENOUS at 01:06

## 2024-06-24 RX ADMIN — FENTANYL CITRATE 50 MCG: 50 INJECTION, SOLUTION INTRAMUSCULAR; INTRAVENOUS at 08:06

## 2024-06-24 RX ADMIN — PROPOFOL 100 MG: 10 INJECTION, EMULSION INTRAVENOUS at 07:06

## 2024-06-24 RX ADMIN — FENTANYL CITRATE 50 MCG: 50 INJECTION, SOLUTION INTRAMUSCULAR; INTRAVENOUS at 10:06

## 2024-06-24 RX ADMIN — SODIUM CHLORIDE, POTASSIUM CHLORIDE, SODIUM LACTATE AND CALCIUM CHLORIDE: 600; 310; 30; 20 INJECTION, SOLUTION INTRAVENOUS at 11:06

## 2024-06-24 RX ADMIN — FENTANYL CITRATE 50 MCG: 50 INJECTION, SOLUTION INTRAMUSCULAR; INTRAVENOUS at 07:06

## 2024-06-24 RX ADMIN — EPHEDRINE SULFATE 10 MG: 50 INJECTION INTRAVENOUS at 09:06

## 2024-06-24 RX ADMIN — CEFTRIAXONE SODIUM 1 G: 1 INJECTION, POWDER, FOR SOLUTION INTRAMUSCULAR; INTRAVENOUS at 08:06

## 2024-06-24 RX ADMIN — ROCURONIUM BROMIDE 50 MG: 10 SOLUTION INTRAVENOUS at 07:06

## 2024-06-24 RX ADMIN — SODIUM CHLORIDE, SODIUM GLUCONATE, SODIUM ACETATE, POTASSIUM CHLORIDE AND MAGNESIUM CHLORIDE: 526; 502; 368; 37; 30 INJECTION, SOLUTION INTRAVENOUS at 11:06

## 2024-06-24 RX ADMIN — AMPICILLIN SODIUM AND SULBACTAM SODIUM 3 G: 2; 1 INJECTION, POWDER, FOR SOLUTION INTRAMUSCULAR; INTRAVENOUS at 08:06

## 2024-06-24 RX ADMIN — ALVIMOPAN 12 MG: 12 CAPSULE ORAL at 11:06

## 2024-06-24 RX ADMIN — EPHEDRINE SULFATE 10 MG: 50 INJECTION INTRAVENOUS at 10:06

## 2024-06-24 RX ADMIN — DEXAMETHASONE SODIUM PHOSPHATE 4 MG: 4 INJECTION, SOLUTION INTRA-ARTICULAR; INTRALESIONAL; INTRAMUSCULAR; INTRAVENOUS; SOFT TISSUE at 08:06

## 2024-06-24 RX ADMIN — ROCURONIUM BROMIDE 10 MG: 10 SOLUTION INTRAVENOUS at 10:06

## 2024-06-24 RX ADMIN — ALVIMOPAN 12 MG: 12 CAPSULE ORAL at 06:06

## 2024-06-24 RX ADMIN — ONDANSETRON 4 MG: 2 INJECTION INTRAMUSCULAR; INTRAVENOUS at 01:06

## 2024-06-24 RX ADMIN — LIDOCAINE HYDROCHLORIDE 4 ML: 20 INJECTION, SOLUTION INTRAVENOUS at 07:06

## 2024-06-24 RX ADMIN — ROCURONIUM BROMIDE 20 MG: 10 SOLUTION INTRAVENOUS at 09:06

## 2024-06-24 NOTE — ANESTHESIA PREPROCEDURE EVALUATION
"                                                                                                             06/24/2024  Darby Gary is a 84 y.o., femaleAdmitted through outpatient surgery for robotic Whipple ( pancreatic head adenocarcinoma with lymphadenopathy).  Patient tolerated general anesthesia  (required minimal phenylephrine) in October for diagnostic laparoscopy with MediPort (see below). EKG from earlier this year normal sinus rhythm.  Cleared by  cardiology  as a low risk.  Patient notes walking 3 miles a day pretty regularly without chest pain shortness of breath or syncope.       Transthoracic echo 2018   EF 60% with grade 1 diastolic dysfunction   1+ TR   PA systolic pressure 30    Last 3 sets of Vitals        6/12/2024     2:41 PM 6/19/2024     1:54 PM 6/24/2024     6:08 AM   Vitals - 1 value per visit   SYSTOLIC  159 115   DIASTOLIC  79 72   Pulse  72 77   Temp   36.7 °C (98.1 °F)   Resp   20   SPO2   95 %   Weight (lb) 140 142.4    Weight (kg) 63.504 64.592    Height 5' 2" (1.575 m)     BMI (Calculated) 25.6           Lab Results   Component Value Date    WBC 6.70 05/22/2024    HGB 11.8 (L) 05/22/2024    HCT 36.9 (L) 05/22/2024    MCV 96.3 (H) 05/22/2024     05/22/2024   CMP  Sodium   Date Value Ref Range Status   05/22/2024 142 136 - 145 mmol/L Final     Potassium   Date Value Ref Range Status   05/22/2024 4.7 3.5 - 5.1 mmol/L Final     Chloride   Date Value Ref Range Status   05/22/2024 109 (H) 98 - 107 mmol/L Final     CO2   Date Value Ref Range Status   05/22/2024 28 23 - 31 mmol/L Final     Blood Urea Nitrogen   Date Value Ref Range Status   05/22/2024 17.1 9.8 - 20.1 mg/dL Final     Creatinine   Date Value Ref Range Status   05/22/2024 0.87 0.55 - 1.02 mg/dL Final     Calcium   Date Value Ref Range Status   05/22/2024 9.1 8.4 - 10.2 mg/dL Final     Albumin   Date Value Ref Range Status   05/22/2024 3.7 3.4 - 4.8 g/dL Final     Bilirubin Total   Date Value Ref Range Status "   05/22/2024 0.3 <=1.5 mg/dL Final     ALP   Date Value Ref Range Status   05/22/2024 100 40 - 150 unit/L Final     AST   Date Value Ref Range Status   05/22/2024 23 5 - 34 unit/L Final     ALT   Date Value Ref Range Status   05/22/2024 21 0 - 55 unit/L Final     Estimated GFR-Non    Date Value Ref Range Status   09/06/2021 58 (L) >>=90 mL/min/1.73 m2 Final      Lab Results   Component Value Date    LIPASE 7 09/11/2023      Lab Results   Component Value Date    INR 1.1 09/11/2023     Pre-op Assessment    I have reviewed the Patient Summary Reports.    I have reviewed the NPO Status.   I have reviewed the Medications.     Review of Systems  Anesthesia Hx:               Denies Personal Hx of Anesthesia complications.                    Social:  Non-Smoker       Hematology/Oncology:       -- Anemia:                                  Cardiovascular:     Hypertension                Functional Capacity good / => 4 METS                         Renal/:  Chronic Renal Disease, CKD                Endocrine:  Diabetes, type 2               Physical Exam  General: Well nourished, Cooperative, Alert and Oriented    Airway:  Mallampati: II   Mouth Opening: Normal  TM Distance: Normal  Tongue: Normal  Neck ROM: Normal ROM    Dental:  Intact    Chest/Lungs:  Clear to auscultation, Normal Respiratory Rate    Heart:  Rate: Normal  Rhythm: Regular Rhythm        Anesthesia Plan  Type of Anesthesia, risks & benefits discussed:    Anesthesia Type: Gen ETT  Intra-op Monitoring Plan: Standard ASA Monitors  Post Op Pain Control Plan: multimodal analgesia and IV/PO Opioids PRN  Induction:  IV  Airway Plan: Direct  Informed Consent: Informed consent signed with the Patient and all parties understand the risks and agree with anesthesia plan.  All questions answered.   ASA Score: 3  Day of Surgery Review of History & Physical: H&P Update referred to the surgeon/provider.  Anesthesia Plan Notes: PIV x2    Ready For Surgery From  Anesthesia Perspective.     .

## 2024-06-24 NOTE — TRANSFER OF CARE
"Anesthesia Transfer of Care Note    Patient: Darby Gary    Procedure(s) Performed: Procedure(s) (LRB):  XI ROBOTIC WHIPPLE PROCEDURE (N/A)    Patient location: PACU    Anesthesia Type: general    Transport from OR: Transported from OR on room air with adequate spontaneous ventilation    Post pain: adequate analgesia    Post assessment: no apparent anesthetic complications and tolerated procedure well    Post vital signs: stable    Level of consciousness: awake and alert    Nausea/Vomiting: no nausea/vomiting    Complications: none    Transfer of care protocol was followed      Last vitals: Visit Vitals  BP (!) 108/58   Pulse 76   Temp 36.3 °C (97.3 °F) (Temporal)   Resp 17   Ht 5' 2" (1.575 m)   Wt 64.2 kg (141 lb 8.6 oz)   SpO2 (!) 91%   Breastfeeding No   BMI 25.89 kg/m²     "

## 2024-06-24 NOTE — ANESTHESIA PROCEDURE NOTES
Intubation    Date/Time: 6/24/2024 7:59 AM    Performed by: Gabriel Couch CRNA  Authorized by: Conrad Mirza Jr., MD    Intubation:     Induction:  Intravenous    Intubated:  Postinduction    Mask Ventilation:  Easy mask    Attempts:  1    Attempted By:  CRNA    Method of Intubation:  Direct    Blade:  Norbert 3    Laryngeal View Grade: Grade I - full view of cords      Difficult Airway Encountered?: No      Complications:  None    Airway Device:  Oral endotracheal tube    Airway Device Size:  7.0    Style/Cuff Inflation:  Cuffed (inflated to minimal occlusive pressure)    Tube secured:  20    Secured at:  The lips    Placement Verified By:  Capnometry    Complicating Factors:  None    Findings Post-Intubation:  BS equal bilateral and atraumatic/condition of teeth unchanged

## 2024-06-24 NOTE — OP NOTE
Date of Surgery:  6/24/24    Surgeon:  Chucky Olguin MD    Assistant:  CLARKE Wyatt                            Pre-op Diagnosis:  Adenocarcinoma of the pancreatic head    Post-op Diagnosis:  Same    Procedures:    Lap/Robotic Pancreaticoduodenectomy (Whipple procedure)  Portal/celiac lymphadenectomy  Common bile duct exploration  Falciform ligament pedicle flap  Diagnostic laparoscopy  Cholecystectomy  Wedge resection segment 2 liver    Anesthesia:  GETA    EBL: 75cc    Specimens:  Pancreaticoduodenctomy (whipple resection)    Findings:  Small liver lesion 5mm, wedge resected on initial exam, frozen suggests no malignancy. Otherwise, No evidence of metastatic disease on initial concurrent diagnostic laparoscopy that would preclude an aggressive approach to her primary tumor.  Standard Whipple procedure was performed with regional lymphadenectomy.  The pancreatic parenchyma was extremely soft, the pancreatic duct was dilated.  Reconstruction was performed using duct to mucosa end-to-side pancreaticojejunostomy, choledochojejunostomy and Billroth II type gastrojejunostomy.      Procedure in detail: After informed consent was obtained the patient was brought to the operating room and placed in the supine position.  General endotracheal anesthesia was administered without difficulty.  The patient's abdomen was prepped and draped in sterile fashion.  Under ultrasound guidance bilateral transversus abdominis plain nerve blocks were performed using liposomal bupivacaine.  A supraumbilical incision incision was made and an optical trocar was used into the peritoneal cavity under direct vision.  Pneumoperitoneum was achieved without difficulty.  Additional ports were placed transversely as well as assistant port in a liver retractor was placed beneath the undersurface of segment 4 of the liver.  The abdomen was explored laparoscopically visualizing all parietal peritoneal surfaces and carefully examining the liver.   There was no evidence of obvious metastatic disease that would preclude an aggressive approach to her primary tumor and therefore we proceeded.  The falciform ligament was taken down off the anterior abdominal wall and divided at the umbilicus using the vessel sealing device.  The falciform ligament pedicle flap was created preserving the blood supply to the flap from the umbilical fissure.  It was placed over the right lobe of the liver.  The abdomen was completely explored, there is no evidence of metastatic disease.  The gastrocolic ligament was incised and the plane was developed between the middle colic and the gastroepiploic vessels.  This plan was continued to the right and the hepatic flexure was taken down in the transverse colon and hepatic flexure were retracted inferiorly.   The lesser sac was opened widely.  The inferior border of the pancreas was exposed.  The right gastroepiploic vein and the middle colic vein were ligated at their entry into the SMV.  An extended Kocher maneuver was performed to the level of the left renal vein.  The inferior border of the pancreas was dissected exposing the anterior surface of the SMV, the tunnel was created beneath the neck of the pancreas superiorly.  The tumor appeared resectable.  Stomach was divided from the greater curvature to the incisura using a BRYAN stapler.  The portal dissection was then performed, removing the lymph node over the hepatic artery and extending this dissection towards the celiac.  The inferior border of the hepatic artery was dissected in the adventitial plane and the gastroduodenal artery was identified and dissected circumferentially.  The right gastric artery was divided using the vessel sealing device..  The gastroduodenal artery was ligated using 2-0 silk ligatures and a 4-0 Prolene suture ligature on the hepatic artery side.  It was divided.  Posterior to this the portal vein was dissected along its anterior aspect and the tunnel was  completed beneath the neck of the pancreas without difficulty.  Dissection was carried to the right of the richardson hepatis exposing the bile duct and dissecting the retro-portal lymph nodes all the way up to the caudate lobe of the liver.  These were left en bloc with the specimen.  The gallbladder was taken down in top-down fashion and the cystic artery and duct were divided between clips .  The bile duct was then divided in the distal and oversewn to prevent spillage.  The proximal bile duct was explored using Karena dissector, and was patent with no evidence of obstruction in the right or left hepatic ducts.  A bulldog clamp was placed on the bile duct to prevent ongoing bile spillage.  The ligament of Treitz was mobilized completely and the proximal jejunum divided using a blue load stapler and the fourth portion of the duodenum reflected to the right of the mesenteric vessels.  The pancreas was then divided over a large right angle clamp beneath the neck using the Bovie cautery.  The duct was dilated and the pancreatic parenchyma was soft .  The uncinate process was then dissected off the SMV and portal vein using gentle blunt and sharp dissection with entering branches divided between 3-0 silk ligatures.  The uncinate process was then dissected off of the SMA in the adventitial plane, dividing branches entering the pancreas between hips and using the vessel sealing device.  Once the specimen was freed, it was extracted in an Endo-Catch bag via the stapler port site, the pancreatic margin and bile duct margins were marked with suture, and sent for histopathological evaluation.  The area was then irrigated with copious amounts of antimicrobial solution and meticulous hemostasis was achieved.  The stapled end of the jejunum was brought up, it was attached to the pancreas using a posterior row of 2-0 silk binding Blumgart mattress sutures tied anteriorly.  Small enterotomy was made.  A duct to mucosa anastomosis  was then performed under loupe magnification, using interrupted 5-0 PDS suture over a 5 Micronesian feeding tube stent.  The anastomosis was completed with an anterior row of 3-0 silk Limbert imbricating sutures.  The falciform pedicled flap was then advanced and used to fill the void between the GDA vascular stump and the pancreaticojejunostomy.  The apex of the jejunal limb was then brought up, an enterotomy was made of appropriate size, and a end-to-side choledocho jejunostomy was performed using running 4-0 absorbable V lock suture circumferentially.  The jejunum was then identified emanating from the prior site of the ligament of Treitz, the first mobile loop was brought up in antecolic fashion and attached to the stomach using 2-0 silk Lembert sutures.  Gastrotomy and enterotomies were made, and a side to side Billroth II type gastrojejunostomy was performed using a 60 mm blue load stapler.  The common enterotomy was reapproximated in transverse fashion to avoid narrowing the lumen with 2-0 silk sutures and then closed using a 60 blue load stapler.  The anastomosis was carefully examined, it was of adequate patency, there is no evidence of tension or ischemia whatsoever.  The abdomen was then irrigated with copious amounts of antimicrobial solution, meticulous hemostasis was achieved.  A 19 Micronesian Shant drain was left near the pancreaticojejunostomy anastomosis and biliary anastomosis.  The fascia at the extraction site was closed using running #1 looped PDS suture.  Port sites were closed with absorbable suture, skin closed with skin staples.  Sterile dressings were applied.  The patient tolerated the procedure well he was brought to recovery room in stable condition.    Significant surgical tasks conducted by the assistant:  The skilled assistance of the nurse practitioner CLARKE Wyatt was necessary for the successful completion of this case.  She was essential for the proper positioning of the patient,  manipulation of instruments, proper exposure, manipulation of tissue, and wound closure            Chucky Olguin MD  Surgical Oncology  Complex General, Gastrointestinal and Hepatobiliary Surgery

## 2024-06-25 LAB
ALBUMIN SERPL-MCNC: 3.3 G/DL (ref 3.4–4.8)
ALBUMIN/GLOB SERPL: 1.4 RATIO (ref 1.1–2)
ALP SERPL-CCNC: 75 UNIT/L (ref 40–150)
ALT SERPL-CCNC: 33 UNIT/L (ref 0–55)
AMYLASE FLD-CCNC: 124 U/L
AMYLASE FLD-CCNC: 156 U/L
ANION GAP SERPL CALC-SCNC: 10 MEQ/L
APTT PPP: 29.7 SECONDS (ref 23.2–33.7)
AST SERPL-CCNC: 43 UNIT/L (ref 5–34)
BASOPHILS # BLD AUTO: 0.02 X10(3)/MCL
BASOPHILS NFR BLD AUTO: 0.1 %
BILIRUB SERPL-MCNC: 0.4 MG/DL
BUN SERPL-MCNC: 15.8 MG/DL (ref 9.8–20.1)
CALCIUM SERPL-MCNC: 8.5 MG/DL (ref 8.4–10.2)
CHLORIDE SERPL-SCNC: 102 MMOL/L (ref 98–107)
CO2 SERPL-SCNC: 23 MMOL/L (ref 23–31)
CREAT SERPL-MCNC: 0.76 MG/DL (ref 0.55–1.02)
CREAT/UREA NIT SERPL: 21
EOSINOPHIL # BLD AUTO: 0.03 X10(3)/MCL (ref 0–0.9)
EOSINOPHIL NFR BLD AUTO: 0.2 %
ERYTHROCYTE [DISTWIDTH] IN BLOOD BY AUTOMATED COUNT: 13.3 % (ref 11.5–17)
GFR SERPLBLD CREATININE-BSD FMLA CKD-EPI: >60 ML/MIN/1.73/M2
GLOBULIN SER-MCNC: 2.4 GM/DL (ref 2.4–3.5)
GLUCOSE SERPL-MCNC: 116 MG/DL (ref 82–115)
HCT VFR BLD AUTO: 34.7 % (ref 37–47)
HGB BLD-MCNC: 11.6 G/DL (ref 12–16)
IMM GRANULOCYTES # BLD AUTO: 0.05 X10(3)/MCL (ref 0–0.04)
IMM GRANULOCYTES NFR BLD AUTO: 0.4 %
INR PPP: 1.1
LYMPHOCYTES # BLD AUTO: 1.57 X10(3)/MCL (ref 0.6–4.6)
LYMPHOCYTES NFR BLD AUTO: 11.1 %
MAGNESIUM SERPL-MCNC: 2.2 MG/DL (ref 1.6–2.6)
MCH RBC QN AUTO: 30.3 PG (ref 27–31)
MCHC RBC AUTO-ENTMCNC: 33.4 G/DL (ref 33–36)
MCV RBC AUTO: 90.6 FL (ref 80–94)
MONOCYTES # BLD AUTO: 1.07 X10(3)/MCL (ref 0.1–1.3)
MONOCYTES NFR BLD AUTO: 7.6 %
NEUTROPHILS # BLD AUTO: 11.37 X10(3)/MCL (ref 2.1–9.2)
NEUTROPHILS NFR BLD AUTO: 80.6 %
NRBC BLD AUTO-RTO: 0 %
PLATELET # BLD AUTO: 272 X10(3)/MCL (ref 130–400)
PMV BLD AUTO: 9.9 FL (ref 7.4–10.4)
POCT GLUCOSE: 113 MG/DL (ref 70–110)
POCT GLUCOSE: 117 MG/DL (ref 70–110)
POCT GLUCOSE: 133 MG/DL (ref 70–110)
POCT GLUCOSE: 144 MG/DL (ref 70–110)
POTASSIUM SERPL-SCNC: 4.2 MMOL/L (ref 3.5–5.1)
PROT SERPL-MCNC: 5.7 GM/DL (ref 5.8–7.6)
PROTHROMBIN TIME: 13.6 SECONDS (ref 12.5–14.5)
RBC # BLD AUTO: 3.83 X10(6)/MCL (ref 4.2–5.4)
SODIUM SERPL-SCNC: 135 MMOL/L (ref 136–145)
WBC # BLD AUTO: 14.11 X10(3)/MCL (ref 4.5–11.5)

## 2024-06-25 PROCEDURE — 85730 THROMBOPLASTIN TIME PARTIAL: CPT | Performed by: NURSE PRACTITIONER

## 2024-06-25 PROCEDURE — 80053 COMPREHEN METABOLIC PANEL: CPT | Performed by: NURSE PRACTITIONER

## 2024-06-25 PROCEDURE — 94799 UNLISTED PULMONARY SVC/PX: CPT

## 2024-06-25 PROCEDURE — 85610 PROTHROMBIN TIME: CPT | Performed by: NURSE PRACTITIONER

## 2024-06-25 PROCEDURE — 85025 COMPLETE CBC W/AUTO DIFF WBC: CPT | Performed by: NURSE PRACTITIONER

## 2024-06-25 PROCEDURE — 25000003 PHARM REV CODE 250: Performed by: NURSE PRACTITIONER

## 2024-06-25 PROCEDURE — 83735 ASSAY OF MAGNESIUM: CPT | Performed by: NURSE PRACTITIONER

## 2024-06-25 PROCEDURE — 11000001 HC ACUTE MED/SURG PRIVATE ROOM

## 2024-06-25 PROCEDURE — 63600175 PHARM REV CODE 636 W HCPCS: Performed by: NURSE PRACTITIONER

## 2024-06-25 PROCEDURE — 82150 ASSAY OF AMYLASE: CPT | Performed by: SURGERY

## 2024-06-25 PROCEDURE — 36415 COLL VENOUS BLD VENIPUNCTURE: CPT | Performed by: NURSE PRACTITIONER

## 2024-06-25 RX ADMIN — HYDROMORPHONE HYDROCHLORIDE 1 MG: 2 INJECTION INTRAMUSCULAR; INTRAVENOUS; SUBCUTANEOUS at 03:06

## 2024-06-25 RX ADMIN — ALVIMOPAN 12 MG: 12 CAPSULE ORAL at 09:06

## 2024-06-25 RX ADMIN — ALVIMOPAN 12 MG: 12 CAPSULE ORAL at 08:06

## 2024-06-25 RX ADMIN — HYDROMORPHONE HYDROCHLORIDE 1 MG: 2 INJECTION, SOLUTION INTRAMUSCULAR; INTRAVENOUS; SUBCUTANEOUS at 05:06

## 2024-06-25 RX ADMIN — ACETAMINOPHEN 1000 MG: 10 INJECTION, SOLUTION INTRAVENOUS at 05:06

## 2024-06-25 RX ADMIN — SODIUM CHLORIDE, POTASSIUM CHLORIDE, SODIUM LACTATE AND CALCIUM CHLORIDE: 600; 310; 30; 20 INJECTION, SOLUTION INTRAVENOUS at 09:06

## 2024-06-25 RX ADMIN — SODIUM CHLORIDE, POTASSIUM CHLORIDE, SODIUM LACTATE AND CALCIUM CHLORIDE: 600; 310; 30; 20 INJECTION, SOLUTION INTRAVENOUS at 08:06

## 2024-06-25 RX ADMIN — ENOXAPARIN SODIUM 40 MG: 40 INJECTION SUBCUTANEOUS at 05:06

## 2024-06-25 RX ADMIN — OXYCODONE HYDROCHLORIDE 5 MG: 5 TABLET ORAL at 01:06

## 2024-06-25 RX ADMIN — HYDROMORPHONE HYDROCHLORIDE 1 MG: 2 INJECTION INTRAMUSCULAR; INTRAVENOUS; SUBCUTANEOUS at 11:06

## 2024-06-25 RX ADMIN — OXYCODONE HYDROCHLORIDE 5 MG: 5 TABLET ORAL at 09:06

## 2024-06-25 NOTE — PLAN OF CARE
Problem: Adult Inpatient Plan of Care  Goal: Plan of Care Review  Outcome: Progressing  Goal: Patient-Specific Goal (Individualized)  Outcome: Progressing  Goal: Absence of Hospital-Acquired Illness or Injury  Outcome: Progressing  Goal: Optimal Comfort and Wellbeing  Outcome: Progressing  Goal: Readiness for Transition of Care  Outcome: Progressing     Problem: Diabetes Comorbidity  Goal: Blood Glucose Level Within Targeted Range  Outcome: Progressing     Problem: Infection  Goal: Absence of Infection Signs and Symptoms  Outcome: Progressing     Problem: Wound  Goal: Optimal Coping  Outcome: Progressing  Goal: Optimal Functional Ability  Outcome: Progressing  Goal: Absence of Infection Signs and Symptoms  Outcome: Progressing  Goal: Improved Oral Intake  Outcome: Progressing  Goal: Optimal Pain Control and Function  Outcome: Progressing  Goal: Skin Health and Integrity  Outcome: Progressing  Goal: Optimal Wound Healing  Outcome: Progressing     Problem: Fall Injury Risk  Goal: Absence of Fall and Fall-Related Injury  Outcome: Progressing

## 2024-06-25 NOTE — PLAN OF CARE
06/25/24 1439   Discharge Assessment   Assessment Type Discharge Planning Assessment   Confirmed/corrected address, phone number and insurance Yes   Confirmed Demographics Correct on Facesheet   Source of Information patient   Communicated TOÑITO with patient/caregiver Date not available/Unable to determine   Reason For Admission neoplasm of pancreas; s/p whipple   People in Home grandchild(laina)  (pt's grand sonBrandon lives with pt in a single story home with no steps to enter the home)   Do you expect to return to your current living situation? Yes   Do you have help at home or someone to help you manage your care at home? No  (pt's grand son works during the day)   Prior to hospitilization cognitive status: Unable to Assess   Current cognitive status: Alert/Oriented   Walking or Climbing Stairs Difficulty no   Dressing/Bathing Difficulty no   Home Layout Able to live on 1st floor   Equipment Currently Used at Home none   Readmission within 30 days? No   Patient currently being followed by outpatient case management? No   Do you currently have service(s) that help you manage your care at home? No   Do you take prescription medications? Yes   Do you have prescription coverage? Yes   Coverage [employer]   Who is going to help you get home at discharge? family   How do you get to doctors appointments? car, drives self   Are you on dialysis? No   Discharge Plan A Home   Discharge Plan B Home   DME Needed Upon Discharge  none   Discharge Plan discussed with: Patient   Transition of Care Barriers None   Housing Stability   In the last 12 months, was there a time when you were not able to pay the mortgage or rent on time? N   Transportation Needs   Has the lack of transportation kept you from medical appointments, meetings, work or from getting things needed for daily living? No   Food Insecurity   Within the past 12 months, you worried that your food would run out before you got the money to buy more. Never true   OTHER    Name(s) of People in Home Brandon grand son     Pt's PCP is at the [facility] in [place]. Pt's  is her dgtr,  [phone]). Pt had [facility] in the past. [facility] uses [facility] off of [address]. Pt does drive. CM to follow

## 2024-06-25 NOTE — ANESTHESIA POSTPROCEDURE EVALUATION
Anesthesia Post Evaluation    Patient: Darby Gary    Procedure(s) Performed: Procedure(s) (LRB):  XI ROBOTIC WHIPPLE PROCEDURE (N/A)    Final Anesthesia Type: general      Patient location during evaluation: PACU  Patient participation: Yes- Able to Participate  Level of consciousness: awake and alert  Post-procedure vital signs: reviewed and stable  Pain management: adequate  Airway patency: patent      Anesthetic complications: no      Cardiovascular status: hemodynamically stable  Respiratory status: unassisted  Hydration status: euvolemic  Follow-up not needed.              Vitals Value Taken Time   /70 06/24/24 1631   Temp 36.3 °C (97.3 °F) 06/24/24 1343   Pulse 76 06/24/24 1643   Resp 13 06/24/24 1643   SpO2 98 % 06/24/24 1643   Vitals shown include unfiled device data.      Event Time   Out of Recovery 15:43:00         Pain/Hoda Score: Pain Rating Prior to Med Admin: 6 (6/25/2024  5:48 AM)  Hoda Score: 10 (6/24/2024  8:15 PM)

## 2024-06-25 NOTE — PROGRESS NOTES
SURG ONC POD 1    PT LOOKS GOOD  HAS TAKEN VERY LITTLE PAIN MEDICATION  TOLERATING CLEARS  REPORTS SLEPT WELL  PAIN WELL CONTROLLED    ABD SOFT  INCISIONS HEALING WELL  BOTH DRAINS SEROSANG, SENT EACH FOR DRAIN AMYLASE THIS AM    VSS; NO FEVER  WBC 85343   11/34  FEDERICO REVIEWED  UO MORE THAN ADEQUATE    PLAN  DC SAMUEL  MOBILIZE, IS  SURGERY DISCUSSED AND QUESTIONS ANSWERED  SHE IS VERY APPRECIATIVE

## 2024-06-25 NOTE — NURSING
Nurses Note -- 4 Eyes    06/24/24  2030pm      Skin assessed during: Admit      [x] No Altered Skin Integrity Present    []Prevention Measures Documented      [] Yes- Altered Skin Integrity Present or Discovered   [] LDA Added if Not in Epic (Describe Wound)   [] New Altered Skin Integrity was Present on Admit and Documented in LDA   [] Wound Image Taken    Wound Care Consulted? No    Attending Nurse:  Jamila Murcia Rn    Second RN/Staff Member:   Caryn

## 2024-06-26 LAB
ALBUMIN SERPL-MCNC: 3.2 G/DL (ref 3.4–4.8)
ALBUMIN/GLOB SERPL: 1.3 RATIO (ref 1.1–2)
ALP SERPL-CCNC: 71 UNIT/L (ref 40–150)
ALT SERPL-CCNC: 26 UNIT/L (ref 0–55)
ANION GAP SERPL CALC-SCNC: 10 MEQ/L
APTT PPP: 32 SECONDS (ref 23.2–33.7)
AST SERPL-CCNC: 28 UNIT/L (ref 5–34)
BASOPHILS # BLD AUTO: 0.04 X10(3)/MCL
BASOPHILS NFR BLD AUTO: 0.4 %
BILIRUB SERPL-MCNC: 0.4 MG/DL
BUN SERPL-MCNC: 11.5 MG/DL (ref 9.8–20.1)
CALCIUM SERPL-MCNC: 8.2 MG/DL (ref 8.4–10.2)
CHLORIDE SERPL-SCNC: 102 MMOL/L (ref 98–107)
CO2 SERPL-SCNC: 26 MMOL/L (ref 23–31)
CREAT SERPL-MCNC: 0.76 MG/DL (ref 0.55–1.02)
CREAT/UREA NIT SERPL: 15
EOSINOPHIL # BLD AUTO: 0.11 X10(3)/MCL (ref 0–0.9)
EOSINOPHIL NFR BLD AUTO: 1 %
ERYTHROCYTE [DISTWIDTH] IN BLOOD BY AUTOMATED COUNT: 13.6 % (ref 11.5–17)
GFR SERPLBLD CREATININE-BSD FMLA CKD-EPI: >60 ML/MIN/1.73/M2
GLOBULIN SER-MCNC: 2.5 GM/DL (ref 2.4–3.5)
GLUCOSE SERPL-MCNC: 111 MG/DL (ref 82–115)
HCT VFR BLD AUTO: 34.5 % (ref 37–47)
HGB BLD-MCNC: 11.4 G/DL (ref 12–16)
IMM GRANULOCYTES # BLD AUTO: 0.03 X10(3)/MCL (ref 0–0.04)
IMM GRANULOCYTES NFR BLD AUTO: 0.3 %
INR PPP: 1.1
LYMPHOCYTES # BLD AUTO: 1.59 X10(3)/MCL (ref 0.6–4.6)
LYMPHOCYTES NFR BLD AUTO: 14.7 %
MAGNESIUM SERPL-MCNC: 2 MG/DL (ref 1.6–2.6)
MCH RBC QN AUTO: 30.6 PG (ref 27–31)
MCHC RBC AUTO-ENTMCNC: 33 G/DL (ref 33–36)
MCV RBC AUTO: 92.5 FL (ref 80–94)
MONOCYTES # BLD AUTO: 1.02 X10(3)/MCL (ref 0.1–1.3)
MONOCYTES NFR BLD AUTO: 9.4 %
NEUTROPHILS # BLD AUTO: 8.02 X10(3)/MCL (ref 2.1–9.2)
NEUTROPHILS NFR BLD AUTO: 74.2 %
NRBC BLD AUTO-RTO: 0 %
PLATELET # BLD AUTO: 261 X10(3)/MCL (ref 130–400)
PMV BLD AUTO: 9.9 FL (ref 7.4–10.4)
POCT GLUCOSE: 125 MG/DL (ref 70–110)
POCT GLUCOSE: 129 MG/DL (ref 70–110)
POTASSIUM SERPL-SCNC: 3.9 MMOL/L (ref 3.5–5.1)
PROT SERPL-MCNC: 5.7 GM/DL (ref 5.8–7.6)
PROTHROMBIN TIME: 14.4 SECONDS (ref 12.5–14.5)
RBC # BLD AUTO: 3.73 X10(6)/MCL (ref 4.2–5.4)
SODIUM SERPL-SCNC: 138 MMOL/L (ref 136–145)
WBC # BLD AUTO: 10.81 X10(3)/MCL (ref 4.5–11.5)

## 2024-06-26 PROCEDURE — 85730 THROMBOPLASTIN TIME PARTIAL: CPT | Performed by: NURSE PRACTITIONER

## 2024-06-26 PROCEDURE — 83735 ASSAY OF MAGNESIUM: CPT | Performed by: NURSE PRACTITIONER

## 2024-06-26 PROCEDURE — 99900035 HC TECH TIME PER 15 MIN (STAT)

## 2024-06-26 PROCEDURE — 85025 COMPLETE CBC W/AUTO DIFF WBC: CPT | Performed by: NURSE PRACTITIONER

## 2024-06-26 PROCEDURE — 11000001 HC ACUTE MED/SURG PRIVATE ROOM

## 2024-06-26 PROCEDURE — 80053 COMPREHEN METABOLIC PANEL: CPT | Performed by: NURSE PRACTITIONER

## 2024-06-26 PROCEDURE — 36415 COLL VENOUS BLD VENIPUNCTURE: CPT | Performed by: NURSE PRACTITIONER

## 2024-06-26 PROCEDURE — 94799 UNLISTED PULMONARY SVC/PX: CPT

## 2024-06-26 PROCEDURE — 99900031 HC PATIENT EDUCATION (STAT)

## 2024-06-26 PROCEDURE — 25000003 PHARM REV CODE 250: Performed by: NURSE PRACTITIONER

## 2024-06-26 PROCEDURE — 85610 PROTHROMBIN TIME: CPT | Performed by: NURSE PRACTITIONER

## 2024-06-26 PROCEDURE — 63600175 PHARM REV CODE 636 W HCPCS: Performed by: NURSE PRACTITIONER

## 2024-06-26 PROCEDURE — 63600175 PHARM REV CODE 636 W HCPCS: Performed by: SURGERY

## 2024-06-26 PROCEDURE — 25000003 PHARM REV CODE 250: Performed by: SURGERY

## 2024-06-26 RX ORDER — ACETAMINOPHEN 10 MG/ML
1000 INJECTION, SOLUTION INTRAVENOUS EVERY 8 HOURS
Status: COMPLETED | OUTPATIENT
Start: 2024-06-26 | End: 2024-06-26

## 2024-06-26 RX ORDER — DEXTROSE MONOHYDRATE, SODIUM CHLORIDE, AND POTASSIUM CHLORIDE 50; 1.49; 4.5 G/1000ML; G/1000ML; G/1000ML
INJECTION, SOLUTION INTRAVENOUS CONTINUOUS
Status: DISCONTINUED | OUTPATIENT
Start: 2024-06-26 | End: 2024-06-28 | Stop reason: HOSPADM

## 2024-06-26 RX ORDER — MUPIROCIN 20 MG/G
OINTMENT TOPICAL 2 TIMES DAILY
Status: DISCONTINUED | OUTPATIENT
Start: 2024-06-26 | End: 2024-06-28 | Stop reason: HOSPADM

## 2024-06-26 RX ORDER — ACETAMINOPHEN 10 MG/ML
1000 INJECTION, SOLUTION INTRAVENOUS ONCE
Status: DISCONTINUED | OUTPATIENT
Start: 2024-06-26 | End: 2024-06-26

## 2024-06-26 RX ORDER — HYDROMORPHONE HYDROCHLORIDE 2 MG/1
2 TABLET ORAL EVERY 4 HOURS PRN
Status: DISCONTINUED | OUTPATIENT
Start: 2024-06-26 | End: 2024-06-28 | Stop reason: HOSPADM

## 2024-06-26 RX ADMIN — ALVIMOPAN 12 MG: 12 CAPSULE ORAL at 09:06

## 2024-06-26 RX ADMIN — HYDROMORPHONE HYDROCHLORIDE 1 MG: 2 INJECTION INTRAMUSCULAR; INTRAVENOUS; SUBCUTANEOUS at 03:06

## 2024-06-26 RX ADMIN — ACETAMINOPHEN 1000 MG: 10 INJECTION, SOLUTION INTRAVENOUS at 09:06

## 2024-06-26 RX ADMIN — ENOXAPARIN SODIUM 40 MG: 40 INJECTION SUBCUTANEOUS at 04:06

## 2024-06-26 RX ADMIN — ACETAMINOPHEN 1000 MG: 10 INJECTION, SOLUTION INTRAVENOUS at 02:06

## 2024-06-26 RX ADMIN — HYDROMORPHONE HYDROCHLORIDE 2 MG: 2 TABLET ORAL at 04:06

## 2024-06-26 RX ADMIN — HYDROMORPHONE HYDROCHLORIDE 2 MG: 2 TABLET ORAL at 09:06

## 2024-06-26 RX ADMIN — MUPIROCIN: 20 OINTMENT TOPICAL at 09:06

## 2024-06-26 RX ADMIN — POTASSIUM CHLORIDE, DEXTROSE MONOHYDRATE AND SODIUM CHLORIDE: 150; 5; 450 INJECTION, SOLUTION INTRAVENOUS at 09:06

## 2024-06-26 NOTE — PROGRESS NOTES
No acute events, request different oral pain med  No N/V    Drain output benign  Afebrile, vital signs stable  Urine output adequate  No apparent distress  Regular rate and rhythm, clear to auscultation bilaterally  Abdomen is soft, nondistended, incisions are clean dry and intact  Extremities without edema, SCDs in place    Labs reviewed    Drain amylase low x 2    POD 2 Stuarto Jamey  Doing very well  Adv to fulls w supplements  Decrease IVF rate  Ambulate  Change pain meds  Re order Ofirmev

## 2024-06-27 LAB
ALBUMIN SERPL-MCNC: 3.2 G/DL (ref 3.4–4.8)
ALBUMIN/GLOB SERPL: 1.1 RATIO (ref 1.1–2)
ALP SERPL-CCNC: 77 UNIT/L (ref 40–150)
ALT SERPL-CCNC: 24 UNIT/L (ref 0–55)
ANION GAP SERPL CALC-SCNC: 9 MEQ/L
APTT PPP: 30.7 SECONDS (ref 23.2–33.7)
AST SERPL-CCNC: 29 UNIT/L (ref 5–34)
BASOPHILS # BLD AUTO: 0.05 X10(3)/MCL
BASOPHILS NFR BLD AUTO: 0.6 %
BILIRUB SERPL-MCNC: 0.5 MG/DL
BUN SERPL-MCNC: 8.9 MG/DL (ref 9.8–20.1)
CALCIUM SERPL-MCNC: 8.8 MG/DL (ref 8.4–10.2)
CHLORIDE SERPL-SCNC: 104 MMOL/L (ref 98–107)
CO2 SERPL-SCNC: 27 MMOL/L (ref 23–31)
CREAT SERPL-MCNC: 0.78 MG/DL (ref 0.55–1.02)
CREAT/UREA NIT SERPL: 11
EOSINOPHIL # BLD AUTO: 0.32 X10(3)/MCL (ref 0–0.9)
EOSINOPHIL NFR BLD AUTO: 3.6 %
ERYTHROCYTE [DISTWIDTH] IN BLOOD BY AUTOMATED COUNT: 13.5 % (ref 11.5–17)
GFR SERPLBLD CREATININE-BSD FMLA CKD-EPI: >60 ML/MIN/1.73/M2
GLOBULIN SER-MCNC: 2.8 GM/DL (ref 2.4–3.5)
GLUCOSE SERPL-MCNC: 129 MG/DL (ref 82–115)
HCT VFR BLD AUTO: 37.9 % (ref 37–47)
HGB BLD-MCNC: 12.4 G/DL (ref 12–16)
IMM GRANULOCYTES # BLD AUTO: 0.02 X10(3)/MCL (ref 0–0.04)
IMM GRANULOCYTES NFR BLD AUTO: 0.2 %
INR PPP: 1.1
LYMPHOCYTES # BLD AUTO: 1.7 X10(3)/MCL (ref 0.6–4.6)
LYMPHOCYTES NFR BLD AUTO: 18.9 %
MAGNESIUM SERPL-MCNC: 2.1 MG/DL (ref 1.6–2.6)
MCH RBC QN AUTO: 30.4 PG (ref 27–31)
MCHC RBC AUTO-ENTMCNC: 32.7 G/DL (ref 33–36)
MCV RBC AUTO: 92.9 FL (ref 80–94)
MONOCYTES # BLD AUTO: 0.86 X10(3)/MCL (ref 0.1–1.3)
MONOCYTES NFR BLD AUTO: 9.6 %
NEUTROPHILS # BLD AUTO: 6.03 X10(3)/MCL (ref 2.1–9.2)
NEUTROPHILS NFR BLD AUTO: 67.1 %
NRBC BLD AUTO-RTO: 0 %
PLATELET # BLD AUTO: 254 X10(3)/MCL (ref 130–400)
PMV BLD AUTO: 9.8 FL (ref 7.4–10.4)
POCT GLUCOSE: 143 MG/DL (ref 70–110)
POTASSIUM SERPL-SCNC: 3.8 MMOL/L (ref 3.5–5.1)
PROT SERPL-MCNC: 6 GM/DL (ref 5.8–7.6)
PROTHROMBIN TIME: 13.8 SECONDS (ref 12.5–14.5)
PSYCHE PATHOLOGY RESULT: NORMAL
RBC # BLD AUTO: 4.08 X10(6)/MCL (ref 4.2–5.4)
SODIUM SERPL-SCNC: 140 MMOL/L (ref 136–145)
WBC # BLD AUTO: 8.98 X10(3)/MCL (ref 4.5–11.5)

## 2024-06-27 PROCEDURE — 85730 THROMBOPLASTIN TIME PARTIAL: CPT | Performed by: NURSE PRACTITIONER

## 2024-06-27 PROCEDURE — 94799 UNLISTED PULMONARY SVC/PX: CPT | Mod: XB

## 2024-06-27 PROCEDURE — 85025 COMPLETE CBC W/AUTO DIFF WBC: CPT | Performed by: NURSE PRACTITIONER

## 2024-06-27 PROCEDURE — 85610 PROTHROMBIN TIME: CPT | Performed by: NURSE PRACTITIONER

## 2024-06-27 PROCEDURE — 11000001 HC ACUTE MED/SURG PRIVATE ROOM

## 2024-06-27 PROCEDURE — 25000003 PHARM REV CODE 250: Performed by: SURGERY

## 2024-06-27 PROCEDURE — 36415 COLL VENOUS BLD VENIPUNCTURE: CPT | Performed by: NURSE PRACTITIONER

## 2024-06-27 PROCEDURE — 63600175 PHARM REV CODE 636 W HCPCS: Performed by: SURGERY

## 2024-06-27 PROCEDURE — 80053 COMPREHEN METABOLIC PANEL: CPT | Performed by: NURSE PRACTITIONER

## 2024-06-27 PROCEDURE — 83735 ASSAY OF MAGNESIUM: CPT | Performed by: NURSE PRACTITIONER

## 2024-06-27 PROCEDURE — 99900031 HC PATIENT EDUCATION (STAT)

## 2024-06-27 PROCEDURE — 25000003 PHARM REV CODE 250: Performed by: NURSE PRACTITIONER

## 2024-06-27 PROCEDURE — 63600175 PHARM REV CODE 636 W HCPCS: Performed by: NURSE PRACTITIONER

## 2024-06-27 RX ORDER — BISACODYL 10 MG/1
10 SUPPOSITORY RECTAL DAILY PRN
Status: DISCONTINUED | OUTPATIENT
Start: 2024-06-27 | End: 2024-06-28 | Stop reason: HOSPADM

## 2024-06-27 RX ADMIN — HYDROMORPHONE HYDROCHLORIDE 2 MG: 2 TABLET ORAL at 04:06

## 2024-06-27 RX ADMIN — HYDROMORPHONE HYDROCHLORIDE 2 MG: 2 TABLET ORAL at 08:06

## 2024-06-27 RX ADMIN — ENOXAPARIN SODIUM 40 MG: 40 INJECTION SUBCUTANEOUS at 05:06

## 2024-06-27 RX ADMIN — ALVIMOPAN 12 MG: 12 CAPSULE ORAL at 08:06

## 2024-06-27 RX ADMIN — HYDROMORPHONE HYDROCHLORIDE 2 MG: 2 TABLET ORAL at 12:06

## 2024-06-27 RX ADMIN — MUPIROCIN: 20 OINTMENT TOPICAL at 08:06

## 2024-06-27 RX ADMIN — POTASSIUM CHLORIDE, DEXTROSE MONOHYDRATE AND SODIUM CHLORIDE: 150; 5; 450 INJECTION, SOLUTION INTRAVENOUS at 06:06

## 2024-06-27 RX ADMIN — BISACODYL 10 MG: 10 SUPPOSITORY RECTAL at 02:06

## 2024-06-27 NOTE — PROGRESS NOTES
SURG ONC POD 3    PT CONTINUES TO LOOK GREAT  SHE HAS ZERO COMPLAINTS  TOLERATING FULLS WITHOUT TROUBLE  PASSING GAS  NO COMPLAINTS OF PAIN  NO NAUSEA OR VOMITING  NO FEVER OR CHILLS  SHE IS AMBULATING AND USING IS VERY WELL    VSS; NO FEVER  WBC 8000  HH 12/37  LYTES GOOD  DRAINS BOTH SEROSANG    ABD SOFT  INCISIONS HEALING WELL    PLAN  ADV TO SOFT DIABETIC DIET  DRAIN AMYLASE IN AM  CONTINUE TO MOBILIZE  HL IV  HOME TOMORROW IF ALL OK  DULC SUPP PRN

## 2024-06-28 VITALS
DIASTOLIC BLOOD PRESSURE: 85 MMHG | BODY MASS INDEX: 25.95 KG/M2 | HEIGHT: 62 IN | HEART RATE: 81 BPM | RESPIRATION RATE: 16 BRPM | SYSTOLIC BLOOD PRESSURE: 144 MMHG | OXYGEN SATURATION: 96 % | TEMPERATURE: 98 F | WEIGHT: 141 LBS

## 2024-06-28 DIAGNOSIS — C25.9 MALIGNANT NEOPLASM OF PANCREAS, UNSPECIFIED LOCATION OF MALIGNANCY: Primary | ICD-10-CM

## 2024-06-28 PROBLEM — K86.89 PANCREATIC MASS: Status: ACTIVE | Noted: 2024-06-28

## 2024-06-28 PROBLEM — K86.89 PANCREATIC MASS: Status: RESOLVED | Noted: 2023-09-12 | Resolved: 2024-06-28

## 2024-06-28 LAB
ALBUMIN SERPL-MCNC: 2.9 G/DL (ref 3.4–4.8)
ALBUMIN/GLOB SERPL: 1 RATIO (ref 1.1–2)
ALP SERPL-CCNC: 65 UNIT/L (ref 40–150)
ALT SERPL-CCNC: 19 UNIT/L (ref 0–55)
AMYLASE FLD-CCNC: 301 U/L
AMYLASE FLD-CCNC: 315 U/L
ANION GAP SERPL CALC-SCNC: 8 MEQ/L
APTT PPP: 31.5 SECONDS (ref 23.2–33.7)
AST SERPL-CCNC: 20 UNIT/L (ref 5–34)
BASOPHILS # BLD AUTO: 0.05 X10(3)/MCL
BASOPHILS NFR BLD AUTO: 0.5 %
BILIRUB SERPL-MCNC: 0.4 MG/DL
BUN SERPL-MCNC: 12.2 MG/DL (ref 9.8–20.1)
CALCIUM SERPL-MCNC: 8.6 MG/DL (ref 8.4–10.2)
CHLORIDE SERPL-SCNC: 104 MMOL/L (ref 98–107)
CO2 SERPL-SCNC: 27 MMOL/L (ref 23–31)
CREAT SERPL-MCNC: 0.79 MG/DL (ref 0.55–1.02)
CREAT/UREA NIT SERPL: 15
EOSINOPHIL # BLD AUTO: 0.36 X10(3)/MCL (ref 0–0.9)
EOSINOPHIL NFR BLD AUTO: 3.8 %
ERYTHROCYTE [DISTWIDTH] IN BLOOD BY AUTOMATED COUNT: 13.3 % (ref 11.5–17)
GFR SERPLBLD CREATININE-BSD FMLA CKD-EPI: >60 ML/MIN/1.73/M2
GLOBULIN SER-MCNC: 2.9 GM/DL (ref 2.4–3.5)
GLUCOSE SERPL-MCNC: 124 MG/DL (ref 82–115)
HCT VFR BLD AUTO: 33 % (ref 37–47)
HGB BLD-MCNC: 11.2 G/DL (ref 12–16)
IMM GRANULOCYTES # BLD AUTO: 0.03 X10(3)/MCL (ref 0–0.04)
IMM GRANULOCYTES NFR BLD AUTO: 0.3 %
INR PPP: 1.1
LYMPHOCYTES # BLD AUTO: 1.71 X10(3)/MCL (ref 0.6–4.6)
LYMPHOCYTES NFR BLD AUTO: 17.8 %
MAGNESIUM SERPL-MCNC: 2 MG/DL (ref 1.6–2.6)
MCH RBC QN AUTO: 30.4 PG (ref 27–31)
MCHC RBC AUTO-ENTMCNC: 33.9 G/DL (ref 33–36)
MCV RBC AUTO: 89.4 FL (ref 80–94)
MONOCYTES # BLD AUTO: 0.82 X10(3)/MCL (ref 0.1–1.3)
MONOCYTES NFR BLD AUTO: 8.5 %
NEUTROPHILS # BLD AUTO: 6.63 X10(3)/MCL (ref 2.1–9.2)
NEUTROPHILS NFR BLD AUTO: 69.1 %
NRBC BLD AUTO-RTO: 0 %
PLATELET # BLD AUTO: 236 X10(3)/MCL (ref 130–400)
PMV BLD AUTO: 9.6 FL (ref 7.4–10.4)
POTASSIUM SERPL-SCNC: 3.8 MMOL/L (ref 3.5–5.1)
PROT SERPL-MCNC: 5.8 GM/DL (ref 5.8–7.6)
PROTHROMBIN TIME: 14.1 SECONDS (ref 12.5–14.5)
RBC # BLD AUTO: 3.69 X10(6)/MCL (ref 4.2–5.4)
SODIUM SERPL-SCNC: 139 MMOL/L (ref 136–145)
WBC # BLD AUTO: 9.6 X10(3)/MCL (ref 4.5–11.5)

## 2024-06-28 PROCEDURE — 85025 COMPLETE CBC W/AUTO DIFF WBC: CPT | Performed by: NURSE PRACTITIONER

## 2024-06-28 PROCEDURE — 25000003 PHARM REV CODE 250: Performed by: NURSE PRACTITIONER

## 2024-06-28 PROCEDURE — 83735 ASSAY OF MAGNESIUM: CPT | Performed by: NURSE PRACTITIONER

## 2024-06-28 PROCEDURE — 85610 PROTHROMBIN TIME: CPT | Performed by: NURSE PRACTITIONER

## 2024-06-28 PROCEDURE — 80053 COMPREHEN METABOLIC PANEL: CPT | Performed by: NURSE PRACTITIONER

## 2024-06-28 PROCEDURE — 36415 COLL VENOUS BLD VENIPUNCTURE: CPT | Performed by: NURSE PRACTITIONER

## 2024-06-28 PROCEDURE — 25000003 PHARM REV CODE 250: Performed by: SURGERY

## 2024-06-28 PROCEDURE — 85730 THROMBOPLASTIN TIME PARTIAL: CPT | Performed by: NURSE PRACTITIONER

## 2024-06-28 PROCEDURE — 82150 ASSAY OF AMYLASE: CPT | Performed by: NURSE PRACTITIONER

## 2024-06-28 RX ORDER — HYDROCODONE BITARTRATE AND ACETAMINOPHEN 5; 325 MG/1; MG/1
1 TABLET ORAL EVERY 6 HOURS PRN
Qty: 15 TABLET | Refills: 0 | Status: SHIPPED | OUTPATIENT
Start: 2024-06-28

## 2024-06-28 RX ADMIN — HYDROMORPHONE HYDROCHLORIDE 2 MG: 2 TABLET ORAL at 04:06

## 2024-06-28 RX ADMIN — ALVIMOPAN 12 MG: 12 CAPSULE ORAL at 09:06

## 2024-06-28 NOTE — PROGRESS NOTES
Pt was given discharge instructions, home medications, and follow up appointments. She was educated on drain care for both YANN drains and discharged to home in stable condition with all questions answered.

## 2024-06-28 NOTE — PROGRESS NOTES
SURG ONC POD 4    PT DRESSED AND EAGER TO GO HOME  SHE IS TOLERATING DIET  NO NAUSEA OR VOMITING  NO REPORTS OF PAIN  NO FEVER OR CHILLS    ABD SOFT  INCISIONS HEALING WELL    VSS; NO FEVER  WBC 9000  HH 11/33  LYTES WNL  DRAIN AMYLASE 315/301  UO MORE THAN ADEQUATE  DRAINS SEROSANG    PLAN  DC HOME  RX [facility]CO  FU OFFICE NEXT TUESDAY  PT WILL GO TO [facility] MONDAY FOR DRAIN AMYLASE CHECK

## 2024-06-28 NOTE — PLAN OF CARE
06/28/24 1016   Final Note   Assessment Type Discharge Planning Assessment   Anticipated Discharge Disposition Home   Hospital Resources/Appts/Education Provided Appointments scheduled and added to AVS   Post-Acute Status   Discharge Delays None known at this time     Pt will dc today. Pt has no dc needs at this time

## 2024-06-29 ENCOUNTER — HOSPITAL ENCOUNTER (EMERGENCY)
Facility: HOSPITAL | Age: 84
Discharge: HOME | End: 2024-06-29
Attending: EMERGENCY MEDICINE
Payer: COMMERCIAL

## 2024-06-29 VITALS
BODY MASS INDEX: 25.95 KG/M2 | TEMPERATURE: 98 F | DIASTOLIC BLOOD PRESSURE: 76 MMHG | WEIGHT: 141 LBS | SYSTOLIC BLOOD PRESSURE: 140 MMHG | HEART RATE: 82 BPM | HEIGHT: 62 IN | RESPIRATION RATE: 15 BRPM | OXYGEN SATURATION: 96 %

## 2024-06-29 DIAGNOSIS — K59.00 CONSTIPATION, UNSPECIFIED CONSTIPATION TYPE: Primary | ICD-10-CM

## 2024-06-29 PROCEDURE — 99283 EMERGENCY DEPT VISIT LOW MDM: CPT

## 2024-06-29 PROCEDURE — 25000003 PHARM REV CODE 250: Performed by: EMERGENCY MEDICINE

## 2024-06-29 RX ORDER — DOCUSATE SODIUM 100 MG/1
100 CAPSULE, LIQUID FILLED ORAL 2 TIMES DAILY
Qty: 60 CAPSULE | Refills: 0 | Status: SHIPPED | OUTPATIENT
Start: 2024-06-29

## 2024-06-29 RX ORDER — SYRING-NEEDL,DISP,INSUL,0.3 ML 29 G X1/2"
296 SYRINGE, EMPTY DISPOSABLE MISCELLANEOUS
Status: COMPLETED | OUTPATIENT
Start: 2024-06-29 | End: 2024-06-29

## 2024-06-29 RX ORDER — POLYETHYLENE GLYCOL 3350 17 G/17G
17 POWDER, FOR SOLUTION ORAL DAILY PRN
Qty: 7 EACH | Refills: 0 | Status: SHIPPED | OUTPATIENT
Start: 2024-06-29 | End: 2024-07-06

## 2024-06-29 RX ADMIN — MAGNESIUM CITRATE 296 ML: 1.75 LIQUID ORAL at 12:06

## 2024-06-29 NOTE — ED PROVIDER NOTES
Encounter Date: 6/29/2024    SCRIBE #1 NOTE: I, Barber Moreno, am scribing for, and in the presence of,  Shell Olguin MD. I have scribed the following portions of the note - Other sections scribed: HPI, ROS, PE.       History     Chief Complaint   Patient presents with    Post-op Problem     Pt here for drainage to linden drain site on the right side. Pt reports neither drain is draining. Fluid noted to b/l LINDEN drain tubes currently. Pt also reports she is constipated since her surgery on Monday.      Patient is an 85 y/o female with a history of DM, HTN, HLD, and pancreatic cancer s/p robotic whipple done on 6/24/24 by Dr. Chucky Olguin who presents to the ED via EMS for evaluation of post-operative constipation. Pt was discharged from the hospital yesterday with a suppository to help her have a bowel movement, however she has only been able to have one very small and hard bowel movement today. She has also been taking magnesium citrate without relief. Pt does note that she has been taking Norco for pain but has not been taking stool softeners with it. She is not passing gas and feels a lot of pressure in her lower abdomen that feels like she needs to have a BM. Notes that she was having 2 bowel movements per day before her surgery. Pt also complains of drainage leaking from her right LINDEN drain and states that both drains are draining less fluid. Also reports an episode and nausea and vomiting yellow contents since surgery but denies N/V today, fever, pain, SOB. Reports that she has been eating and drinking properly otherwise.    The history is provided by the patient. No  was used.   Constipation   The problem has been unchanged. The stool is described as hard. Ineffective treatments: suppositories, magnesium citrate. Associated symptoms include nausea (resolved) and vomiting (resolved). Pertinent negatives include no fever, no abdominal pain, no diarrhea, no chest pain, no headaches, no coughing  and no rash. Her past medical history is significant for abdominal surgery.     Review of patient's allergies indicates:  No Known Allergies  Past Medical History:   Diagnosis Date    Arthritis     Diabetes mellitus     Hyperlipidemia     Hypertension     Kidney cysts     Neuropathy     Osteoporosis     Pancreatic cancer     Taste sense altered     Weight loss, unintentional      Past Surgical History:   Procedure Laterality Date    CATARACT EXTRACTION Bilateral     COLONOSCOPY      DIAGNOSTIC LAPAROSCOPY N/A 10/09/2023    Procedure: LAPAROSCOPY, DIAGNOSTIC  Req 12/12:30;  Surgeon: Chucky Olguin MD;  Location: [facility] OR;  Service: General;  Laterality: N/A;    ERCP N/A 09/13/2023    Procedure: ERCP (ENDOSCOPIC RETROGRADE CHOLANGIOPANCREATOGRAPHY)/ cytology brushing;  Surgeon: Erika Ulloa MD;  Location: [facility] ENDOSCOPY;  Service: Gastroenterology;  Laterality: N/A;    ERCP W/ SPHICTEROTOMY  09/13/2023    Procedure: ERCP, WITH SPHINCTEROTOMY;  Surgeon: Erika Ulloa MD;  Location: [facility] ENDOSCOPY;  Service: Gastroenterology;;    ERCP, WITH STENT INSERTION  09/13/2023    Procedure: ERCP, WITH STENT INSERTION;  Surgeon: Erika Ulloa MD;  Location: [facility] ENDOSCOPY;  Service: Gastroenterology;;    ESOPHAGOGASTRODUODENOSCOPY N/A 09/13/2023    Procedure: EGD (ESOPHAGOGASTRODUODENOSCOPY);  Surgeon: Erika Ulloa MD;  Location: [facility] ENDOSCOPY;  Service: Gastroenterology;  Laterality: N/A;    PLACEMENT, MEDIPORT Right 10/09/2023    Procedure: PLACEMENT, MEDIPORT;  Surgeon: Chucky Olguin MD;  Location: [facility] OR;  Service: General;  Laterality: Right;    POLYPECTOMY      uterus and colon    ROBOT-ASSISTED LAPAROSCOPIC WHIPPLE PROCEDURE USING DA DAVID XI N/A 6/24/2024    Procedure: XI ROBOTIC WHIPPLE PROCEDURE;  Surgeon: Chucky Olguin MD;  Location: [facility] OR;  Service: General;  Laterality: N/A;  *     Family History   Problem Relation Name Age of Onset    Leukemia Father      Prostate cancer  Brother      Breast cancer Other niece 48    Breast cancer Maternal Aunt      Breast cancer Maternal Aunt      Breast cancer Maternal Aunt      Cancer Paternal Uncle       Social History     Tobacco Use    Smoking status: Former     Types: Cigarettes    Smokeless tobacco: Never    Tobacco comments:     Quit over 25 years ago.    Substance Use Topics    Alcohol use: Never    Drug use: Never     Review of Systems   Constitutional:  Negative for fever.   HENT:  Negative for rhinorrhea.    Respiratory:  Negative for cough and shortness of breath.    Cardiovascular:  Negative for chest pain.   Gastrointestinal:  Positive for constipation, nausea (resolved) and vomiting (resolved). Negative for abdominal pain and diarrhea.   Genitourinary:  Negative for dysuria.   Musculoskeletal:  Negative for back pain and neck pain.   Skin:  Negative for rash.   Neurological:  Negative for weakness, numbness and headaches.       Physical Exam     Initial Vitals [06/29/24 0945]   BP Pulse Resp Temp SpO2   112/63 63 18 98.1 °F (36.7 °C) 99 %      MAP       --         Physical Exam    Nursing note and vitals reviewed.  Constitutional: She appears well-developed and well-nourished. She is not diaphoretic. No distress.   HENT:   Head: Normocephalic and atraumatic.   Mouth/Throat: Oropharynx is clear and moist.   Eyes: Conjunctivae and EOM are normal.   Neck: Neck supple.   Normal range of motion.  Cardiovascular:  Normal rate and regular rhythm.           Pulmonary/Chest: Breath sounds normal. No respiratory distress. She has no wheezes. She has no rhonchi. She has no rales.   Abdominal: Abdomen is soft. She exhibits no distension. There is no abdominal tenderness.   Quiet bowel sounds  Laparoscopic surgical sites clean, dry, and intact. There is no purulent drainage or surrounding erythema to the wounds.  The right and left YANN drain sutures in place, serosanguineous drainage noted in both drains. The right YANN drain has minimal  serosanguineous drainage around the tube. There is no purulent drainage, tenderness, erythema, fluctuance, or induration around either site and they are nontender There is no rebound and no guarding.   Musculoskeletal:         General: No tenderness or edema. Normal range of motion.      Cervical back: Normal range of motion and neck supple.     Neurological: She is alert and oriented to person, place, and time. She has normal strength.   Skin: Skin is warm and dry. Capillary refill takes less than 2 seconds.   Psychiatric: She has a normal mood and affect.         ED Course   Procedures  Labs Reviewed - No data to display       Imaging Results    None          Medications   magnesium citrate solution 296 mL (296 mLs Oral Given 6/29/24 1230)     Medical Decision Making  83 yo F here for post-op constipation, on opiates, no stool softener. Tried half a bottle of mag citrate with only minimal relief. Surgical wounds are healing well, abdomen soft, no peritoneal signs. She is well-appearing, afebrile tolerating PO, had a small BM in the ED. I don't feel emergent labs or imaging warranted based on exam. Labs from discharge yesterday reviewed and unremarkable. I will discuss the case with surgery. Reassess.      The differential diagnosis includes, but is not limited to: tube dislodgement, seroma, less likely: abscess, constipation, ileus, bowel obstruction.    Problems Addressed:  Constipation, unspecified constipation type: self-limited or minor problem    Risk  OTC drugs.            Scribe Attestation:   Scribe #1: I performed the above scribed service and the documentation accurately describes the services I performed. I attest to the accuracy of the note.    Attending Attestation:           Physician Attestation for Scribe:  Physician Attestation Statement for Scribe #1: I, Shell Olguin MD, reviewed documentation, as scribed by Barber Moreno in my presence, and it is both accurate and complete.             ED  "Course as of 07/01/24 1114   Sat Jun 29, 2024   1113 On-call for Dr. Chucky Olguin paged. Spoke to Dr. Potter, discussed the case. He agrees no labs or imaging warranted at this time.  Patient is to start colace, take miralax until she has a BM, then ok to stop miralax and just continue colace. She is to keep her appointment on Tuesday as scheduled. YANN drain on right redressed. Return for worsening or acute issues. She feels comfortable with the plan and is discharged in stable condition. [RB]      ED Course User Index  [RB] Shell Olguin MD            BP (!) 140/76   Pulse 82   Temp 98.1 °F (36.7 °C) (Temporal)   Resp 15   Ht 5' 2" (1.575 m)   Wt 64 kg (141 lb)   SpO2 96%   BMI 25.79 kg/m²                    Clinical Impression:  Final diagnoses:  [K59.00] Constipation, unspecified constipation type (Primary)          ED Disposition Condition    Discharge Stable          ED Prescriptions       Medication Sig Dispense Start Date End Date Auth. Provider    docusate sodium (COLACE) 100 MG capsule Take 1 capsule (100 mg total) by mouth 2 (two) times daily. 60 capsule 6/29/2024 -- Shell Olguin MD    polyethylene glycol (GLYCOLAX) 17 gram PwPk Take 17 g by mouth daily as needed for Constipation. 7 each 6/29/2024 7/6/2024 Shell Olguin MD          Follow-up Information       Follow up With Specialties Details Why Contact Info    [facility] - Emergency Dept Emergency Medicine  As needed, If symptoms worsen [address] [facility] [id number] [phone]    Chucky Olguin MD Surgical Oncology  Keep your appointment on Tuesday, July 2, as scheduled [facility] [facility]  [address]  [facility] [address]  [phone]               Shell Olguin MD  07/01/24 1115    "

## 2024-07-01 ENCOUNTER — LAB VISIT (OUTPATIENT)
Dept: LAB | Facility: HOSPITAL | Age: 84
End: 2024-07-01
Attending: SURGERY
Payer: COMMERCIAL

## 2024-07-01 DIAGNOSIS — C25.9 MALIGNANT NEOPLASM OF PANCREAS, UNSPECIFIED LOCATION OF MALIGNANCY: ICD-10-CM

## 2024-07-01 LAB
AMYLASE FLD-CCNC: 114 U/L
AMYLASE FLD-CCNC: 80 U/L

## 2024-07-01 PROCEDURE — 82150 ASSAY OF AMYLASE: CPT

## 2024-07-02 ENCOUNTER — PATIENT OUTREACH (OUTPATIENT)
Dept: ADMINISTRATIVE | Facility: CLINIC | Age: 84
End: 2024-07-02
Payer: COMMERCIAL

## 2024-07-08 NOTE — PHYSICIAN QUERY
Please clarify the pathology findings:  Pathology findings noted above are ruled in/confirmed as diagnoses

## 2024-07-09 ENCOUNTER — OFFICE VISIT (OUTPATIENT)
Dept: SURGICAL ONCOLOGY | Facility: CLINIC | Age: 84
End: 2024-07-09
Payer: COMMERCIAL

## 2024-07-09 VITALS
HEIGHT: 62 IN | BODY MASS INDEX: 26.13 KG/M2 | SYSTOLIC BLOOD PRESSURE: 130 MMHG | DIASTOLIC BLOOD PRESSURE: 82 MMHG | HEART RATE: 68 BPM | WEIGHT: 142 LBS

## 2024-07-09 DIAGNOSIS — C25.9 MALIGNANT NEOPLASM OF PANCREAS, UNSPECIFIED LOCATION OF MALIGNANCY: Primary | ICD-10-CM

## 2024-07-09 PROCEDURE — 99024 POSTOP FOLLOW-UP VISIT: CPT | Mod: S$GLB,,, | Performed by: NURSE PRACTITIONER

## 2024-07-09 NOTE — PROGRESS NOTES
POST OP DIANA/JAMIL MILLS    PT LOOKS AMAZING  TELLS ME SHE IS DOING GREAT AT HOME  EATING  HAVING BMS, NO DIARRHEA  NO ISSUES WITH BLOOD SUGARS  NO FEVER OR CHILLS  NO COMPLAINTS OF PAIN  NO ISSUES SINCE DRAIN REMOVAL    ABD SOFT  INCISIONS HEALING WELL  NO SIGNS OF INFECTION    PATH: ADENO 2.6CM, CLEAR MARGINS, NEG NODES  DR SIMPSON REVIEWS PATH  PATH DISCUSSED WITH PT AND QUESTIONS ANSWERED    SHE IS ESTABLISHED WITH MED ONC DR MOSQUERA  SHE HAS FOLLOW UP WITH HIM ALREADY SCHEDULED    RESTRICTIONS DISCUSSED AND QUESTIONS ANSWERED  SHE IS VERY APPRECIATIVE  DR SIMPSON VISITS WITH PT  SHE KNOWS TO CALL WITH ANY PROBLEMS    RTC 6-8 WEEKS

## 2024-07-10 ENCOUNTER — PATIENT MESSAGE (OUTPATIENT)
Dept: SURGICAL ONCOLOGY | Facility: CLINIC | Age: 84
End: 2024-07-10
Payer: COMMERCIAL

## 2024-07-16 NOTE — PROGRESS NOTES
Subjective:       Patient ID: Darby Gary is a 84 y.o. female.    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  This was staged using CT scan.  Patient had surgery on 06/24/2024, pathology revealed well-differentiated pancreatic ductal adenocarcinoma measuring 2.6 cm with clear margins, there were 24 lymph nodes removed all negative for malignancy, final pathologic stage was yp T2, N0    Current Treatment:   Observation    Treatment History:  ERCP on 09/13/2023  Modified FOLFIRINOX without the 5FU bolus.  This treatment was started on 10/10/2023  Whipple done on 06/24/2024    HPI:  84-year-old female was seen by her nephrologist, blood work revealed elevated liver enzymes and bilirubin, she was instructed to present to the emergency department on 09/11/2023.  She presented to [facility] Emergency Department on 09/11/2023.  CMP in the emergency department revealed an ALT of 233, AST of 263, alkaline phosphatase of 1113, and a bilirubin of 2.3.  Hepatitis panel was done and was negative, lipase was also within normal limits.  A CEA was normal at 1.96, CA 19 9 was extremely elevated at 2461.89.  The patient underwent a CT scan of the abdomen and pelvis with contrast on 09/11/2023 that revealed suspected obstructive pancreatic head mass measuring 2.2 cm with moderate dilation of the biliary ductal system and severe dilation of the pancreatic duct.  There was 1-2 possible regional enlarged lymph nodes in the periportal region.  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  A biliary sphincterotomy was performed and 1 uncovered metal biliary stent was placed.  Brushings were obtained, cytology revealed adenocarcinoma.  She was discharged home in stable condition on 09/14/2023.   The patient was referred to Medical Oncology.  I saw the patient on 09/25/2023.  She stated that she was feeling okay.  She had no major issues to discuss.  She was seen by Dr. Chucky Olguin on 10/03/2023, underwent diagnostic laparoscopy and port placement on 10/10/2023.  No metastatic disease seen on diagnostic laparoscopy.  Started modified FOLFIRINOX on 10/10/2023.    Cycle 12 given on 03/19/2024  Surgery delayed due to insurance denial  Cycle 13 given on 05/22/2024     CT scan of the chest, abdomen, and pelvis done on 06/12/2024 showed a mass lesion at the head of the pancreas that had decreased in size with no CT evidence of metastatic disease.    Whipple done on 06/24/2024, pathology revealed well-differentiated pancreatic ductal adenocarcinoma measuring 2.6 cm with clear margins, there were 24 lymph nodes removed all negative for malignancy, final pathologic stage was yp T2, N0    Interval History:   Patient is here today for a treatment visit.  She started modified FOLFIRINOX on 10/10/2023 and is due for cycle 12 today.  She has been tolerating treatment well and feels well overall.  She denies any pain, bowel difficulties, nausea.  Her main complaint today is numbness and tingling mainly in her legs and feet.  It does remain intermittent and somewhat improved since oxaliplatin discontinued.     Past Medical History:   Diagnosis Date    Arthritis     Diabetes mellitus     Hyperlipidemia     Hypertension     Kidney cysts     Neuropathy     Osteoporosis     Pancreatic cancer     Taste sense altered     Weight loss, unintentional       Past Surgical History:   Procedure Laterality Date    CATARACT EXTRACTION Bilateral     COLONOSCOPY      DIAGNOSTIC LAPAROSCOPY N/A 10/09/2023    Procedure: LAPAROSCOPY, DIAGNOSTIC  Req 12/12:30;  Surgeon: Chucky Olguin MD;  Location: [facility];  Service: General;  Laterality: N/A;    ERCP N/A 09/13/2023    Procedure: ERCP (ENDOSCOPIC RETROGRADE CHOLANGIOPANCREATOGRAPHY)/  cytology brushing;  Surgeon: Erika Ulloa MD;  Location: [facility] ENDOSCOPY;  Service: Gastroenterology;  Laterality: N/A;    ERCP W/ SPHICTEROTOMY  09/13/2023    Procedure: ERCP, WITH SPHINCTEROTOMY;  Surgeon: Erika Ulloa MD;  Location: [facility] ENDOSCOPY;  Service: Gastroenterology;;    ERCP, WITH STENT INSERTION  09/13/2023    Procedure: ERCP, WITH STENT INSERTION;  Surgeon: Erika Ulloa MD;  Location: [facility] ENDOSCOPY;  Service: Gastroenterology;;    ESOPHAGOGASTRODUODENOSCOPY N/A 09/13/2023    Procedure: EGD (ESOPHAGOGASTRODUODENOSCOPY);  Surgeon: Erika Ulloa MD;  Location: [facility] ENDOSCOPY;  Service: Gastroenterology;  Laterality: N/A;    PLACEMENT, MEDIPORT Right 10/09/2023    Procedure: PLACEMENT, MEDIPORT;  Surgeon: Chucky Olguin MD;  Location: [facility] OR;  Service: General;  Laterality: Right;    POLYPECTOMY      uterus and colon    ROBOT-ASSISTED LAPAROSCOPIC WHIPPLE PROCEDURE USING DA DAVID XI N/A 6/24/2024    Procedure: XI ROBOTIC WHIPPLE PROCEDURE;  Surgeon: Chucky Olguin MD;  Location: [facility];  Service: General;  Laterality: N/A;  *     Social History     Socioeconomic History    Marital status:    Tobacco Use    Smoking status: Former     Types: Cigarettes    Smokeless tobacco: Never    Tobacco comments:     Quit over 25 years ago.    Substance and Sexual Activity    Alcohol use: Never    Drug use: Never    Sexual activity: Not Currently     Social Determinants of Health     Financial Resource Strain: High Risk (9/13/2023)    Overall Financial Resource Strain (CARDIA)     Difficulty of Paying Living Expenses: Hard   Food Insecurity: Food Insecurity Present (6/25/2024)    Hunger Vital Sign     Worried About Running Out of Food in the Last Year: Never true     Ran Out of Food in the Last Year: Sometimes true   Transportation Needs: No Transportation Needs (6/25/2024)    TRANSPORTATION NEEDS     Transportation : No   Physical Activity: Inactive (9/13/2023)     Exercise Vital Sign     Days of Exercise per Week: 0 days     Minutes of Exercise per Session: 0 min   Stress: No Stress Concern Present (9/13/2023)    [employer] [place] [employer] - Occupational Stress Questionnaire     Feeling of Stress : Only a little   Housing Stability: Low Risk  (9/13/2023)    Housing Stability Vital Sign     Unable to Pay for Housing in the Last Year: No     Number of Places Lived in the Last Year: 1     Unstable Housing in the Last Year: No      Family History   Problem Relation Name Age of Onset    Leukemia Father      Prostate cancer Brother      Breast cancer Other niece 48    Breast cancer Maternal Aunt      Breast cancer Maternal Aunt      Breast cancer Maternal Aunt      Cancer Paternal Uncle        Review of patient's allergies indicates:  No Known Allergies   Review of Systems   Constitutional:  Negative for chills, diaphoresis, fatigue, fever and unexpected weight change.   HENT:  Negative for nasal congestion, mouth sores, sinus pressure/congestion and sore throat.    Eyes:  Negative for pain and visual disturbance.   Respiratory:  Negative for cough, chest tightness and shortness of breath.    Cardiovascular:  Negative for chest pain, palpitations and leg swelling.   Gastrointestinal:  Negative for abdominal distention, abdominal pain, blood in stool, constipation and diarrhea.   Genitourinary:  Negative for dysuria, frequency and hematuria.   Musculoskeletal:  Negative for arthralgias and back pain.   Integumentary:  Negative for rash.   Neurological:  Positive for numbness. Negative for dizziness, weakness and headaches.   Hematological:  Negative for adenopathy.   Psychiatric/Behavioral:  Negative for confusion.          Objective:      Physical Exam  Vitals reviewed.   Constitutional:       General: She is not in acute distress.     Appearance: Normal appearance.   HENT:      Head: Normocephalic and atraumatic.      Nose: Nose normal.      Mouth/Throat:      Mouth:  Mucous membranes are moist.   Eyes:      Extraocular Movements: Extraocular movements intact.      Conjunctiva/sclera: Conjunctivae normal.   Cardiovascular:      Rate and Rhythm: Normal rate and regular rhythm.      Pulses: Normal pulses.      Heart sounds: Normal heart sounds.   Pulmonary:      Effort: Pulmonary effort is normal.      Breath sounds: Normal breath sounds.   Musculoskeletal:         General: No swelling. Normal range of motion.      Cervical back: Normal range of motion and neck supple.      Right lower leg: No edema.      Left lower leg: No edema.   Skin:     General: Skin is warm and dry.   Neurological:      General: No focal deficit present.      Mental Status: She is alert and oriented to person, place, and time. Mental status is at baseline.   Psychiatric:         Mood and Affect: Mood normal.         Behavior: Behavior normal.         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  This was staged using CT scan.        Plan:       Patient has at least stage IIB pancreatic cancer based on CT abdomen/pelvis measurements.     PET/CT scan on 10/05/2023- No FDG avid metastatic disease identified. Focal uptake in the pancreatic head about a CBD stent compatible with patient's primary malignancy.    Patient saw Dr. Chucky Olguin on 10/03/2023, underwent port placement and diagnostic laparoscopy on 10/09/2023.  No metastatic disease seen.  Started modified FOLFIRINOX on 10/10/2023.  Oxaliplatin discontinued with cycle 11.    Cycle 12 given on 03/19/2024   Surgery delayed due to insurance denial  Cycle 13 given on 05/22/2024     CT scan of the chest, abdomen, and pelvis done on 06/12/2024  showed no evidence of metastatic disease with decreased size in the pancreatic oozing.  Whipple done on 06/24/2024    CA 19-9 is 19.22    She has met with Genetics and results were negative.    We will get MRI of the right  hip due to pain.    We will call in tramadol and Lyrica.    RTC in 3 months with labs and CT scans.    CBC, CMP, CA 19-9    Javed Forman II, MD

## 2024-07-16 NOTE — DISCHARGE SUMMARY
[facility] - 8th Floor Med Surg  General Surgery  Discharge Summary      Patient Name: Darby Gary  MRN: [id number]  Admission Date: 6/24/2024  Hospital Length of Stay: 4 days  Discharge Date and Time: 6/28/2024 11:30 AM  Attending Physician: No att. providers found   Discharging Provider: CLARKE Ross  Primary Care Provider: Neida Menchaca [facility] -    HPI:   No notes on file    Procedure(s) (LRB):  XI ROBOTIC WHIPPLE PROCEDURE (N/A)      Indwelling Lines/Drains at time of discharge:   Lines/Drains/Airways       Peripherally Inserted Central Catheter Line  Duration             PICC Double Lumen 01/06/23 1132 left basilic 26 days                  Hospital Course: 84 YEAR OLD FEMALE DX PANCREATIC CANCER UNDERWENT LAP/JAMIL WHIPPLE PROCEDURE; NO POST OPERATIVE COMPLICATIONS, STABLE ON DISCHARGE    Goals of Care Treatment Preferences:  Code Status: Full Code      Consults:       Significant Diagnostic Studies: N/A    Pending Diagnostic Studies:       None          Final Active Diagnoses:    Diagnosis Date Noted POA    PRINCIPAL PROBLEM:  Pancreatic mass [K86.89] 06/28/2024 Yes      Problems Resolved During this Admission:      Discharged Condition: good    Disposition: Home or Self Care    Follow Up:   Follow-up Information       Darshana Rubin FNP Follow up on 7/2/2024.    Specialties: Surgical Oncology, General Surgery  Why: tuesday of next week-- the nurse will contact you regarding the results of your amylase testing to let you know if you need to go to the office on Tuesday  Contact information:  [address] [facility].  [address]  [facility] [address]  [phone]               [facility]. Go on 7/1/2024.    Why: Go to lab on the 2nd floor of [facility] for YANN drain amylase testing before 10am on Monday July 1, 2024.                         Patient Instructions:   No discharge procedures on file.  Medications:  Reconciled Home Medications:      Medication List        START  taking these medications      HYDROcodone-acetaminophen 5-325 mg per tablet  Commonly known as: NORCO  Take 1 tablet by mouth every 6 (six) hours as needed.            CHANGE how you take these medications      CREON 36,000-114,000- 180,000 unit Cpdr  Generic drug: lipase-protease-amylase  Take one capsule po tid with meals  What changed:   how much to take  how to take this  when to take this            CONTINUE taking these medications      aspirin 81 MG EC tablet  Commonly known as: ECOTRIN  Take 1 tablet (81 mg total) by mouth once daily.     diclofenac sodium 1 % Gel  Commonly known as: VOLTAREN  Apply 2 g topically daily as needed (shoulder pain).     ergocalciferol 50,000 unit Cap  Commonly known as: ERGOCALCIFEROL  Take 50,000 Units by mouth every 7 days.     * gabapentin 300 MG capsule  Commonly known as: NEURONTIN  Take 300-600 mg by mouth every evening.     * gabapentin 100 MG capsule  Commonly known as: NEURONTIN  Take 1 capsule (100 mg total) by mouth every evening.     lisinopriL-hydrochlorothiazide 10-12.5 mg per tablet  Commonly known as: PRINZIDE,ZESTORETIC  Take 1 tablet by mouth once daily.     loperamide 2 mg capsule  Commonly known as: IMODIUM  Take 2 capsules by mouth at the onset of diarrhea then 1 capsule every 2 hours until diarrhea free for 12 hours.     metFORMIN 500 MG tablet  Commonly known as: GLUCOPHAGE  Take 500 mg by mouth once daily.     mirtazapine 7.5 MG Tab  Commonly known as: REMERON  Take 1 tablet (7.5 mg total) by mouth every evening.     mupirocin 2 % ointment  Commonly known as: BACTROBAN     ondansetron 8 MG Tbdl  Commonly known as: ZOFRAN-ODT  Take 1 tablet (8 mg total) by mouth every 6 (six) hours as needed (Nausea).     * potassium chloride 10 MEQ Tbsr  Commonly known as: KLOR-CON  Take 1 tablet (10 mEq total) by mouth 2 (two) times daily.     * potassium chloride SA 10 MEQ tablet  Commonly known as: K-DUR,KLOR-CON M  TAKE ONE TABLET BY MOUTH TWICE A DAY      prochlorperazine 5 MG tablet  Commonly known as: COMPAZINE  Take 1 tablet (5 mg total) by mouth every 6 (six) hours as needed for Nausea.     PROCTO-MED HC 2.5 % rectal cream  Generic drug: hydrocortisone     * simvastatin 40 MG tablet  Commonly known as: ZOCOR     * simvastatin 40 MG tablet  Commonly known as: ZOCOR  Take 40 mg by mouth once daily.     triamcinolone acetonide 0.025% 0.025 % cream  Commonly known as: KENALOG     valACYclovir 1000 MG tablet  Commonly known as: VALTREX  Take 1 tablet (1,000 mg total) by mouth 2 (two) times daily.           * This list has 6 medication(s) that are the same as other medications prescribed for you. Read the directions carefully, and ask your doctor or other care provider to review them with you.                Time spent on the discharge of patient:  minutes    CLARKE Ross  General Surgery  [facility] - 8th Floor Med Surg

## 2024-07-17 ENCOUNTER — LAB VISIT (OUTPATIENT)
Dept: LAB | Facility: HOSPITAL | Age: 84
End: 2024-07-17
Attending: INTERNAL MEDICINE
Payer: COMMERCIAL

## 2024-07-17 ENCOUNTER — OFFICE VISIT (OUTPATIENT)
Dept: HEMATOLOGY/ONCOLOGY | Facility: CLINIC | Age: 84
End: 2024-07-17
Payer: COMMERCIAL

## 2024-07-17 VITALS
OXYGEN SATURATION: 99 % | TEMPERATURE: 98 F | HEIGHT: 62 IN | SYSTOLIC BLOOD PRESSURE: 145 MMHG | RESPIRATION RATE: 18 BRPM | DIASTOLIC BLOOD PRESSURE: 72 MMHG | BODY MASS INDEX: 25.97 KG/M2

## 2024-07-17 DIAGNOSIS — C25.9 PANCREATIC ADENOCARCINOMA: Primary | ICD-10-CM

## 2024-07-17 DIAGNOSIS — C25.9 PANCREATIC ADENOCARCINOMA: ICD-10-CM

## 2024-07-17 DIAGNOSIS — C77.2 MALIGNANT NEOPLASM METASTATIC TO INTRA-ABDOMINAL LYMPH NODE: ICD-10-CM

## 2024-07-17 LAB
ALBUMIN SERPL-MCNC: 3.8 G/DL (ref 3.4–4.8)
ALBUMIN/GLOB SERPL: 1.4 RATIO (ref 1.1–2)
ALP SERPL-CCNC: 79 UNIT/L (ref 40–150)
ALT SERPL-CCNC: 14 UNIT/L (ref 0–55)
ANION GAP SERPL CALC-SCNC: 9 MEQ/L
AST SERPL-CCNC: 20 UNIT/L (ref 5–34)
BASOPHILS # BLD AUTO: 0.03 X10(3)/MCL
BASOPHILS NFR BLD AUTO: 0.5 %
BILIRUB SERPL-MCNC: 0.4 MG/DL
BUN SERPL-MCNC: 11.4 MG/DL (ref 9.8–20.1)
CALCIUM SERPL-MCNC: 9.2 MG/DL (ref 8.4–10.2)
CANCER AG19-9 SERPL-ACNC: 19.22 UNIT/ML (ref 0–37)
CHLORIDE SERPL-SCNC: 101 MMOL/L (ref 98–107)
CO2 SERPL-SCNC: 29 MMOL/L (ref 23–31)
CREAT SERPL-MCNC: 0.78 MG/DL (ref 0.55–1.02)
CREAT/UREA NIT SERPL: 15
EOSINOPHIL # BLD AUTO: 0.44 X10(3)/MCL (ref 0–0.9)
EOSINOPHIL NFR BLD AUTO: 6.7 %
ERYTHROCYTE [DISTWIDTH] IN BLOOD BY AUTOMATED COUNT: 12.7 % (ref 11.5–17)
GFR SERPLBLD CREATININE-BSD FMLA CKD-EPI: >60 ML/MIN/1.73/M2
GLOBULIN SER-MCNC: 2.8 GM/DL (ref 2.4–3.5)
GLUCOSE SERPL-MCNC: 156 MG/DL (ref 82–115)
HCT VFR BLD AUTO: 38.8 % (ref 37–47)
HGB BLD-MCNC: 12.5 G/DL (ref 12–16)
IMM GRANULOCYTES # BLD AUTO: 0.01 X10(3)/MCL (ref 0–0.04)
IMM GRANULOCYTES NFR BLD AUTO: 0.2 %
LYMPHOCYTES # BLD AUTO: 1.89 X10(3)/MCL (ref 0.6–4.6)
LYMPHOCYTES NFR BLD AUTO: 28.7 %
MAGNESIUM SERPL-MCNC: 1.8 MG/DL (ref 1.6–2.6)
MCH RBC QN AUTO: 29.9 PG (ref 27–31)
MCHC RBC AUTO-ENTMCNC: 32.2 G/DL (ref 33–36)
MCV RBC AUTO: 92.8 FL (ref 80–94)
MONOCYTES # BLD AUTO: 0.46 X10(3)/MCL (ref 0.1–1.3)
MONOCYTES NFR BLD AUTO: 7 %
NEUTROPHILS # BLD AUTO: 3.76 X10(3)/MCL (ref 2.1–9.2)
NEUTROPHILS NFR BLD AUTO: 56.9 %
PLATELET # BLD AUTO: 315 X10(3)/MCL (ref 130–400)
PMV BLD AUTO: 9 FL (ref 7.4–10.4)
POTASSIUM SERPL-SCNC: 3.4 MMOL/L (ref 3.5–5.1)
PROT SERPL-MCNC: 6.6 GM/DL (ref 5.8–7.6)
RBC # BLD AUTO: 4.18 X10(6)/MCL (ref 4.2–5.4)
SODIUM SERPL-SCNC: 139 MMOL/L (ref 136–145)
WBC # BLD AUTO: 6.59 X10(3)/MCL (ref 4.5–11.5)

## 2024-07-17 PROCEDURE — 3077F SYST BP >= 140 MM HG: CPT | Mod: CPTII,S$GLB,, | Performed by: INTERNAL MEDICINE

## 2024-07-17 PROCEDURE — 99999 PR PBB SHADOW E&M-EST. PATIENT-LVL V: CPT | Mod: PBBFAC,,, | Performed by: INTERNAL MEDICINE

## 2024-07-17 PROCEDURE — 80053 COMPREHEN METABOLIC PANEL: CPT

## 2024-07-17 PROCEDURE — 86301 IMMUNOASSAY TUMOR CA 19-9: CPT

## 2024-07-17 PROCEDURE — 85025 COMPLETE CBC W/AUTO DIFF WBC: CPT

## 2024-07-17 PROCEDURE — 36415 COLL VENOUS BLD VENIPUNCTURE: CPT

## 2024-07-17 PROCEDURE — 1111F DSCHRG MED/CURRENT MED MERGE: CPT | Mod: CPTII,S$GLB,, | Performed by: INTERNAL MEDICINE

## 2024-07-17 PROCEDURE — 1159F MED LIST DOCD IN RCRD: CPT | Mod: CPTII,S$GLB,, | Performed by: INTERNAL MEDICINE

## 2024-07-17 PROCEDURE — 1125F AMNT PAIN NOTED PAIN PRSNT: CPT | Mod: CPTII,S$GLB,, | Performed by: INTERNAL MEDICINE

## 2024-07-17 PROCEDURE — 83735 ASSAY OF MAGNESIUM: CPT

## 2024-07-17 PROCEDURE — 99214 OFFICE O/P EST MOD 30 MIN: CPT | Mod: S$GLB,,, | Performed by: INTERNAL MEDICINE

## 2024-07-17 PROCEDURE — 3078F DIAST BP <80 MM HG: CPT | Mod: CPTII,S$GLB,, | Performed by: INTERNAL MEDICINE

## 2024-07-17 PROCEDURE — 1101F PT FALLS ASSESS-DOCD LE1/YR: CPT | Mod: CPTII,S$GLB,, | Performed by: INTERNAL MEDICINE

## 2024-07-17 PROCEDURE — 3288F FALL RISK ASSESSMENT DOCD: CPT | Mod: CPTII,S$GLB,, | Performed by: INTERNAL MEDICINE

## 2024-07-17 PROCEDURE — 1160F RVW MEDS BY RX/DR IN RCRD: CPT | Mod: CPTII,S$GLB,, | Performed by: INTERNAL MEDICINE

## 2024-07-17 RX ORDER — PREGABALIN 50 MG/1
50 CAPSULE ORAL 3 TIMES DAILY
Qty: 90 CAPSULE | Refills: 4 | Status: SHIPPED | OUTPATIENT
Start: 2024-07-17 | End: 2025-01-15

## 2024-07-17 RX ORDER — CYCLOBENZAPRINE HCL 10 MG
10 TABLET ORAL 3 TIMES DAILY PRN
COMMUNITY

## 2024-07-17 RX ORDER — TRAMADOL HYDROCHLORIDE 50 MG/1
50 TABLET ORAL EVERY 6 HOURS PRN
Qty: 120 TABLET | Refills: 0 | Status: SHIPPED | OUTPATIENT
Start: 2024-07-17 | End: 2024-08-16

## 2024-07-21 ENCOUNTER — OFFICE VISIT (OUTPATIENT)
Dept: URGENT CARE | Facility: CLINIC | Age: 84
End: 2024-07-21
Payer: COMMERCIAL

## 2024-07-21 VITALS
TEMPERATURE: 98 F | BODY MASS INDEX: 23.74 KG/M2 | SYSTOLIC BLOOD PRESSURE: 135 MMHG | HEART RATE: 89 BPM | WEIGHT: 129 LBS | RESPIRATION RATE: 20 BRPM | DIASTOLIC BLOOD PRESSURE: 71 MMHG | HEIGHT: 62 IN | OXYGEN SATURATION: 96 %

## 2024-07-21 DIAGNOSIS — E11.9 TYPE 2 DIABETES MELLITUS WITHOUT COMPLICATION, WITHOUT LONG-TERM CURRENT USE OF INSULIN: ICD-10-CM

## 2024-07-21 DIAGNOSIS — M54.31 CHRONIC SCIATICA OF RIGHT SIDE: Primary | ICD-10-CM

## 2024-07-21 LAB — GLUCOSE SERPL-MCNC: 144 MG/DL (ref 70–110)

## 2024-07-21 PROCEDURE — 1111F DSCHRG MED/CURRENT MED MERGE: CPT | Mod: CPTII,,,

## 2024-07-21 PROCEDURE — 82962 GLUCOSE BLOOD TEST: CPT | Mod: PBBFAC

## 2024-07-21 PROCEDURE — 99214 OFFICE O/P EST MOD 30 MIN: CPT | Mod: S$PBB,,,

## 2024-07-21 PROCEDURE — 99215 OFFICE O/P EST HI 40 MIN: CPT | Mod: PBBFAC

## 2024-07-21 RX ORDER — DULOXETIN HYDROCHLORIDE 30 MG/1
1 CAPSULE, DELAYED RELEASE ORAL
COMMUNITY
Start: 2024-07-19

## 2024-07-21 RX ORDER — PREDNISONE 10 MG/1
TABLET ORAL
Qty: 18 TABLET | Refills: 0 | Status: SHIPPED | OUTPATIENT
Start: 2024-07-21 | End: 2024-07-26

## 2024-07-21 NOTE — PROGRESS NOTES
"Subjective:       Patient ID: Darby Gary is a 84 y.o. female.    Vitals:  height is 5' 2" (1.575 m) and weight is 58.5 kg (129 lb). Her oral temperature is 98.1 °F (36.7 °C). Her blood pressure is 135/71 and her pulse is 89. Her respiration is 20 and oxygen saturation is 96%.     Chief Complaint: Leg Pain (Patient reports having pain from the right hip to the knee)    83 y/o AAF presents to clinic with daughter, Annemarie. She reports RLE pain similar to sciatica flare x 1 week. Has tried Tylenol and Voltaren Gel is no improvement. Denies any recent falls or injuries, ambulates with cane.      Leg Pain         Musculoskeletal:  Positive for joint pain and muscle ache. Negative for joint swelling and abnormal ROM of joint.       Objective:      Physical Exam   Constitutional: She is oriented to person, place, and time. She is cooperative. She is easily aroused. She does not appear ill. normal and well-groomedawake  HENT:   Head: Normocephalic and atraumatic.   Mouth/Throat: Mucous membranes are normal.   Eyes: Lids are normal.   Neck: Neck supple.   Cardiovascular: Normal rate.   Pulmonary/Chest: Effort normal and breath sounds normal.   Abdominal: Normal appearance.   Musculoskeletal:      Thoracic back: Normal.      Right lower leg: No edema.      Left lower leg: No edema.        Legs:       Comments: (Sitting position) area boxed: +TTP, no swelling, no erythema, no signs of injury.    Neurological: She is alert, oriented to person, place, and time and easily aroused. GCS eye subscore is 4. GCS verbal subscore is 5. GCS motor subscore is 6.   Skin: Skin is warm, dry and intact. Capillary refill takes less than 2 seconds.   Psychiatric: Her behavior is normal.   Nursing note and vitals reviewed.        Assessment:       1. Chronic sciatica of right side    2. Type 2 diabetes mellitus without complication, without long-term current use of insulin          Plan:     Avoid NSAIDs, denies any recent steroid use. Will " prescribe low dose titration tion of prednisone, encouraged to monitor blood sugars. Continue with stretches and sciatica exercises, may also continue with Tylenol Extra Strength, follow up with [place] provider if symptoms does not improve with prescriptive treatment.     Chronic sciatica of right side  -     predniSONE (DELTASONE) 10 MG tablet; Take 2 tablets (20 mg total) by mouth 2 (two) times daily for 3 days, THEN 2 tablets (20 mg total) once daily for 2 days, THEN 1 tablet (10 mg total) once daily for 2 days.  Dispense: 18 tablet; Refill: 0    Type 2 diabetes mellitus without complication, without long-term current use of insulin  -     POCT Glucose, Hand-Held Device

## 2024-07-21 NOTE — PATIENT INSTRUCTIONS
For those with diabetes: When youre taking steroids, you should test your blood sugar 4 times per day (before each meal and before bedtime)

## 2024-07-24 ENCOUNTER — TELEPHONE (OUTPATIENT)
Dept: HEMATOLOGY/ONCOLOGY | Facility: CLINIC | Age: 84
End: 2024-07-24
Payer: COMMERCIAL

## 2024-07-24 NOTE — TELEPHONE ENCOUNTER
Patient c/o sciatica affecting right hip, leg and buttock. States the tramadol that Dr. Forman has given her is not helping. I asked if she was taking the lyrica that he prescribed as well. States she has not tried it yet. I explained that pain medication alone does not relieve nerve pain. I instructed her to start the lyrica tid and to contact our office if she did not start to notice any relief. She voiced understanding.  
No

## 2024-07-26 ENCOUNTER — OFFICE VISIT (OUTPATIENT)
Dept: URGENT CARE | Facility: CLINIC | Age: 84
End: 2024-07-26
Payer: COMMERCIAL

## 2024-07-26 VITALS
TEMPERATURE: 99 F | HEART RATE: 92 BPM | BODY MASS INDEX: 23.74 KG/M2 | DIASTOLIC BLOOD PRESSURE: 83 MMHG | OXYGEN SATURATION: 99 % | HEIGHT: 62 IN | RESPIRATION RATE: 17 BRPM | WEIGHT: 129 LBS | SYSTOLIC BLOOD PRESSURE: 151 MMHG

## 2024-07-26 DIAGNOSIS — M70.61 GREATER TROCHANTERIC BURSITIS OF RIGHT HIP: Primary | ICD-10-CM

## 2024-07-26 PROCEDURE — 99215 OFFICE O/P EST HI 40 MIN: CPT | Mod: PBBFAC | Performed by: FAMILY MEDICINE

## 2024-07-26 RX ORDER — LIDOCAINE HYDROCHLORIDE 10 MG/ML
4 INJECTION INFILTRATION; PERINEURAL
Status: DISCONTINUED | OUTPATIENT
Start: 2024-07-26 | End: 2024-07-26 | Stop reason: HOSPADM

## 2024-07-26 RX ORDER — TRIAMCINOLONE ACETONIDE 40 MG/ML
40 INJECTION, SUSPENSION INTRA-ARTICULAR; INTRAMUSCULAR
Status: COMPLETED | OUTPATIENT
Start: 2024-07-26 | End: 2024-07-26

## 2024-07-26 RX ORDER — TRIAMCINOLONE ACETONIDE 40 MG/ML
40 INJECTION, SUSPENSION INTRA-ARTICULAR; INTRAMUSCULAR
Status: DISCONTINUED | OUTPATIENT
Start: 2024-07-26 | End: 2024-07-26 | Stop reason: HOSPADM

## 2024-07-26 RX ORDER — LIDOCAINE HYDROCHLORIDE 10 MG/ML
4 INJECTION INFILTRATION; PERINEURAL
Status: COMPLETED | OUTPATIENT
Start: 2024-07-26 | End: 2024-07-26

## 2024-07-26 RX ADMIN — LIDOCAINE HYDROCHLORIDE 4 ML: 10 INJECTION INFILTRATION; PERINEURAL at 12:07

## 2024-07-26 RX ADMIN — TRIAMCINOLONE ACETONIDE 40 MG: 40 INJECTION, SUSPENSION INTRA-ARTICULAR; INTRAMUSCULAR at 12:07

## 2024-07-26 NOTE — PROGRESS NOTES
"Subjective:       Patient ID: Darby Gary is a 84 y.o. female.    Vitals:  height is 5' 2" (1.575 m) and weight is 58.5 kg (129 lb). Her oral temperature is 98.5 °F (36.9 °C). Her blood pressure is 151/83 (abnormal) and her pulse is 92. Her respiration is 17 and oxygen saturation is 99%.     Chief Complaint: Leg Pain (RT leg pain, pt was seen on Sunday with Latesha FIELDSP. States symptoms are worse.)    84-year-old female presents to urgent care clinic with chronic right leg pain.  Points to the right lateral thigh, down to the lateral knee.  Exacerbated by lying on that side.  She also has posterior leg pain as well but this does not seem to be as significant today.  She was seen here several days ago by another provider and is currently on a prednisone taper.  She has had minimal relief with that.  She has a history of diabetes, but glucose has been fairly well controlled according to her.  Lab review with glucose 160 or less.    All other systems are negative    Chart reviewed    Objective:   Physical Exam   Constitutional: She appears well-developed.  Non-toxic appearance. She does not appear ill. No distress.   Abdominal: Normal appearance.   Musculoskeletal:      Right lower leg: No edema.      Comments: Right lower extremity:  There is tenderness of the right greater trochanteric bursal area extending down the IT band to the lateral knee.  No anterior thigh or hamstring tenderness.  Low back is nontender.   Neurological: no focal deficit. She is alert. She displays no weakness. Gait normal.   Skin: Skin is not diaphoretic. Capillary refill takes less than 2 seconds.   Psychiatric: Her behavior is normal. Mood normal.   Nursing note and vitals reviewed.        Assessment:     1. Greater trochanteric bursitis of right hip            Plan:   It appears that right greater trochanteric bursitis may be a contributor to her overall symptoms.  In hopes of giving her some relief, we injected the right greater " trochanteric bursa today.  She understands that this may elevate her blood glucose somewhat, and she will monitor.  Discussed the intermittent use of ice for the next 2 days.  Use Tylenol for discomfort.  Follow-up with PCP.  Return to urgent care if we can help.      Greater trochanteric bursitis of right hip  -     LIDOcaine HCL 10 mg/ml (1%) injection 4 mL  -     triamcinolone acetonide injection 40 mg        Please note: This chart was completed via voice to text dictation. It may contain typographical/word recognition errors. If there are any questions, please contact the provider for final clarification.

## 2024-07-26 NOTE — PROCEDURES
Large Joint Aspiration/Injection: R greater trochanteric bursa    Date/Time: 7/26/2024 12:00 PM    Performed by: Sadi Christine MD  Authorized by: Sadi Christine MD    Consent Done?:  Yes (Verbal)  Indications:  Pain  Site marked: the procedure site was marked    Timeout: prior to procedure the correct patient, procedure, and site was verified    Prep: patient was prepped and draped in usual sterile fashion    Anesthesia:  See MAR for details    Details:  Needle Size:  25 G  Ultrasonic Guidance for needle placement?: No    Approach:  Lateral  Location:  Hip  Site:  R greater trochanteric bursa  Medications:  4 mL LIDOcaine HCL 10 mg/ml (1%) 10 mg/mL (1 %); 40 mg triamcinolone acetonide 40 mg/mL  Patient tolerance:  Patient tolerated the procedure well with no immediate complications

## 2024-07-28 ENCOUNTER — OFFICE VISIT (OUTPATIENT)
Dept: URGENT CARE | Facility: CLINIC | Age: 84
End: 2024-07-28
Payer: COMMERCIAL

## 2024-07-28 VITALS
HEART RATE: 82 BPM | RESPIRATION RATE: 20 BRPM | SYSTOLIC BLOOD PRESSURE: 133 MMHG | BODY MASS INDEX: 23.74 KG/M2 | DIASTOLIC BLOOD PRESSURE: 77 MMHG | TEMPERATURE: 98 F | WEIGHT: 129 LBS | HEIGHT: 62 IN | OXYGEN SATURATION: 99 %

## 2024-07-28 DIAGNOSIS — M25.551 RIGHT HIP PAIN: Primary | ICD-10-CM

## 2024-07-28 PROCEDURE — 99214 OFFICE O/P EST MOD 30 MIN: CPT | Mod: S$PBB,,, | Performed by: FAMILY MEDICINE

## 2024-07-28 PROCEDURE — 99215 OFFICE O/P EST HI 40 MIN: CPT | Mod: PBBFAC | Performed by: FAMILY MEDICINE

## 2024-07-28 PROCEDURE — 1111F DSCHRG MED/CURRENT MED MERGE: CPT | Mod: CPTII,,, | Performed by: FAMILY MEDICINE

## 2024-07-28 RX ORDER — PREDNISONE 20 MG/1
20 TABLET ORAL DAILY
Qty: 5 TABLET | Refills: 0 | Status: SHIPPED | OUTPATIENT
Start: 2024-07-28 | End: 2024-08-02

## 2024-07-28 NOTE — PROGRESS NOTES
"Subjective:       Patient ID: Darby Gary is a 84 y.o. female.    Chief Complaint: Leg Pain (RT        SEEN ON 7/21 & 7/26 SAME COMPLAINT, JOINT INJECTION DONE TO HIP ON 7/26)      Leg Pain       84-year-old female with right hip pain.  She was seen on 07/21/2024 and 07/26/2024 for same.  Some improvement but pain returned.  She would like to be referred to orthopedics.  Review of Systems   Musculoskeletal:  Positive for leg pain.         Objective:       Vital Signs  Temp: 98.1 °F (36.7 °C)  Pulse: 82  Resp: 20  SpO2: 99 %  BP: 133/77  Pain Score:   9  Pain Loc: Hip  Height and Weight  Height: 5' 2" (157.5 cm)  Weight: 58.5 kg (128 lb 15.5 oz)  BSA (Calculated - sq m): 1.6 sq meters  BMI (Calculated): 23.6  Weight in (lb) to have BMI = 25: 136.4]  Physical Exam  Vitals reviewed.   Constitutional:       Appearance: Normal appearance.   HENT:      Head: Normocephalic and atraumatic.   Eyes:      Extraocular Movements: Extraocular movements intact.      Conjunctiva/sclera: Conjunctivae normal.   Musculoskeletal:      Comments: Antalgic gait, uses walker   Skin:     General: Skin is warm and dry.   Neurological:      General: No focal deficit present.      Mental Status: She is alert.   Psychiatric:         Mood and Affect: Mood and affect normal.         Speech: Speech normal.         Behavior: Behavior normal. Behavior is cooperative.         Thought Content: Thought content does not include homicidal or suicidal ideation.         Assessment:       Problem List Items Addressed This Visit    None  Visit Diagnoses       Right hip pain    -  Primary    Relevant Medications    predniSONE (DELTASONE) 20 MG tablet    Other Relevant Orders    Ambulatory referral/consult to Orthopedics            Plan:   Encouraged warm/cold compresses and ibuprofen/acetaminophen as needed  ER precautions  Follow-up with PCP      "

## 2024-07-29 ENCOUNTER — PATIENT MESSAGE (OUTPATIENT)
Dept: HEMATOLOGY/ONCOLOGY | Facility: CLINIC | Age: 84
End: 2024-07-29
Payer: COMMERCIAL

## 2024-08-01 ENCOUNTER — OFFICE VISIT (OUTPATIENT)
Dept: URGENT CARE | Facility: CLINIC | Age: 84
End: 2024-08-01
Payer: COMMERCIAL

## 2024-08-01 ENCOUNTER — HOSPITAL ENCOUNTER (OUTPATIENT)
Dept: RADIOLOGY | Facility: HOSPITAL | Age: 84
Discharge: HOME | End: 2024-08-01
Attending: INTERNAL MEDICINE
Payer: COMMERCIAL

## 2024-08-01 VITALS
BODY MASS INDEX: 23.74 KG/M2 | TEMPERATURE: 98 F | DIASTOLIC BLOOD PRESSURE: 90 MMHG | WEIGHT: 129 LBS | SYSTOLIC BLOOD PRESSURE: 168 MMHG | OXYGEN SATURATION: 98 % | RESPIRATION RATE: 18 BRPM | HEART RATE: 92 BPM | HEIGHT: 62 IN

## 2024-08-01 DIAGNOSIS — C25.9 PANCREATIC ADENOCARCINOMA: ICD-10-CM

## 2024-08-01 DIAGNOSIS — C77.2 MALIGNANT NEOPLASM METASTATIC TO INTRA-ABDOMINAL LYMPH NODE: ICD-10-CM

## 2024-08-01 DIAGNOSIS — M79.604 PAIN OF RIGHT LOWER EXTREMITY: Primary | ICD-10-CM

## 2024-08-01 PROCEDURE — 73723 MRI JOINT LWR EXTR W/O&W/DYE: CPT | Mod: TC,RT

## 2024-08-01 PROCEDURE — 25500020 PHARM REV CODE 255: Performed by: INTERNAL MEDICINE

## 2024-08-01 PROCEDURE — 99214 OFFICE O/P EST MOD 30 MIN: CPT | Mod: S$PBB,,, | Performed by: FAMILY MEDICINE

## 2024-08-01 PROCEDURE — 63600175 PHARM REV CODE 636 W HCPCS

## 2024-08-01 PROCEDURE — A9577 INJ MULTIHANCE: HCPCS | Performed by: INTERNAL MEDICINE

## 2024-08-01 PROCEDURE — 99214 OFFICE O/P EST MOD 30 MIN: CPT | Mod: PBBFAC,25 | Performed by: FAMILY MEDICINE

## 2024-08-01 RX ORDER — KETOROLAC TROMETHAMINE 30 MG/ML
30 INJECTION, SOLUTION INTRAMUSCULAR; INTRAVENOUS
Status: COMPLETED | OUTPATIENT
Start: 2024-08-01 | End: 2024-08-01

## 2024-08-01 RX ADMIN — GADOBENATE DIMEGLUMINE 10 ML: 529 INJECTION, SOLUTION INTRAVENOUS at 01:08

## 2024-08-01 RX ADMIN — KETOROLAC TROMETHAMINE 30 MG: 30 INJECTION, SOLUTION INTRAMUSCULAR at 03:08

## 2024-08-01 NOTE — PROGRESS NOTES
"Subjective:       Patient ID: Darby Gary is a 84 y.o. female.    Vitals:  height is 5' 2" (1.575 m) and weight is 58.5 kg (128 lb 15.5 oz). Her oral temperature is 98.2 °F (36.8 °C). Her blood pressure is 168/90 (abnormal) and her pulse is 92. Her respiration is 18 and oxygen saturation is 98%.     Chief Complaint: Leg Pain (RT   (SEEN 7/21 7/26 7/28 FOR SAME COMPLAINT) JOINT INJECTION AND PO STEROIDS DONE./Pt states injection did not relieve any symptoms.)    Ms Pastor returns to urgent care with continued pain in the right thigh.  Points to the anterolateral aspect.  She has tried a course of prednisone with no relief.  I injected her right greater trochanteric bursa in case this was a contributor.  No significant difference.  Oncology ordered a right lower extremity MRI which she had done today, results are pending.  She has occasional back pain as well.  She states she does get pain and numbness in both lower extremities, thought to be neuropathic in nature?  She has had no lower extremity edema or redness.  No groin pain.  No bowel or bladder symptoms.  No saddle anesthesia.  No chest pain or shortness a breath.  No tachycardia or fever.    All other systems are negative    Chart reviewed    Objective:   Physical Exam   Constitutional: She appears well-developed.  Non-toxic appearance. She does not appear ill. No distress.      Comments:Very pleasant     Cardiovascular: Normal rate.   Pulmonary/Chest: Breath sounds normal. No respiratory distress.   Abdominal: Normal appearance. She exhibits no distension. There is no abdominal tenderness. There is no guarding.   Musculoskeletal:         General: No signs of injury.      Right lower leg: No edema.      Left lower leg: No edema.      Comments:   Right Hip:  Inspection:  Antalgic gait,full weight-bearing,normal alignment,no swelling,no erythema,no bruising  Palpation:  Mild tenderness over greater trochanter and mid anterior thigh to deep palpation.  No groin " tenderness.  No appreciable inguinal lymphadenopathy.  No skin changes.  No lower extremity edema.  No palpable cord.  ROM: Fair range of motion  Strength:  Flexion 5/5, Extension 5/5  Sensation: Sensation intact to light touch  Special tests:  ARELY negative  FADIR negative   Neurological: no focal deficit. She is alert. She displays no weakness.   Skin: Skin is not diaphoretic. Capillary refill takes less than 2 seconds.   Psychiatric: Her behavior is normal. Mood normal.   Nursing note and vitals reviewed.        Assessment:     1. Pain of right lower extremity            Plan:   Had another lengthy discussion about possible etiologies.  There are no clinical findings consistent with DVT.  Unfortunately, bursal injection provided little relief.  Prednisone has not helped much either.  She is currently in a workup by her providers to determine the etiology of her symptoms.  From an urgent care standpoint, we are trying to provide her some relief during the interim.  She denies a history of CKD or PUD.  Will give low-dose Toradol IM to try to cover her until tomorrow morning.  She will contact her oncologist who ordered the MRI to try to obtain results and arrange timely follow-up.  She was already referred to orthopedic clinic.  Again, we discussed signs and symptoms that should prompt an immediate ER visit.  She seems to have very good insight and voiced understanding.  Return to see me in urgent care if I can help in any way.      Pain of right lower extremity  -     ketorolac injection 30 mg        Please note: This chart was completed via voice to text dictation. It may contain typographical/word recognition errors. If there are any questions, please contact the provider for final clarification.

## 2024-08-02 ENCOUNTER — PATIENT MESSAGE (OUTPATIENT)
Dept: HEMATOLOGY/ONCOLOGY | Facility: CLINIC | Age: 84
End: 2024-08-02
Payer: COMMERCIAL

## 2024-08-08 ENCOUNTER — TELEPHONE (OUTPATIENT)
Dept: HEMATOLOGY/ONCOLOGY | Facility: CLINIC | Age: 84
End: 2024-08-08
Payer: COMMERCIAL

## 2024-08-15 ENCOUNTER — OFFICE VISIT (OUTPATIENT)
Dept: FAMILY MEDICINE | Facility: CLINIC | Age: 84
End: 2024-08-15
Payer: COMMERCIAL

## 2024-08-15 VITALS
DIASTOLIC BLOOD PRESSURE: 74 MMHG | WEIGHT: 123 LBS | SYSTOLIC BLOOD PRESSURE: 134 MMHG | HEART RATE: 73 BPM | TEMPERATURE: 98 F | OXYGEN SATURATION: 98 % | HEIGHT: 62 IN | BODY MASS INDEX: 22.63 KG/M2

## 2024-08-15 DIAGNOSIS — E11.9 TYPE 2 DIABETES MELLITUS WITHOUT COMPLICATION, WITH LONG-TERM CURRENT USE OF INSULIN: Primary | ICD-10-CM

## 2024-08-15 DIAGNOSIS — M79.18 MYALGIA, OTHER SITE: ICD-10-CM

## 2024-08-15 DIAGNOSIS — M76.31 IT BAND SYNDROME, RIGHT: ICD-10-CM

## 2024-08-15 DIAGNOSIS — Z79.4 TYPE 2 DIABETES MELLITUS WITHOUT COMPLICATION, WITH LONG-TERM CURRENT USE OF INSULIN: Primary | ICD-10-CM

## 2024-08-15 DIAGNOSIS — Z76.89 ENCOUNTER TO ESTABLISH CARE: ICD-10-CM

## 2024-08-15 DIAGNOSIS — Z12.31 ENCOUNTER FOR SCREENING MAMMOGRAM FOR MALIGNANT NEOPLASM OF BREAST: ICD-10-CM

## 2024-08-15 DIAGNOSIS — I10 HYPERTENSION, UNSPECIFIED TYPE: ICD-10-CM

## 2024-08-15 PROBLEM — E66.9 OBESITY: Status: RESOLVED | Noted: 2022-12-27 | Resolved: 2024-08-15

## 2024-08-15 PROBLEM — E44.0 MODERATE MALNUTRITION: Status: RESOLVED | Noted: 2023-09-12 | Resolved: 2024-08-15

## 2024-08-15 PROBLEM — K86.89 PANCREATIC MASS: Status: RESOLVED | Noted: 2024-06-28 | Resolved: 2024-08-15

## 2024-08-15 LAB
25(OH)D3+25(OH)D2 SERPL-MCNC: 25 NG/ML (ref 30–80)
CHOLEST SERPL-MCNC: 187 MG/DL
CHOLEST/HDLC SERPL: 3 {RATIO} (ref 0–5)
EST. AVERAGE GLUCOSE BLD GHB EST-MCNC: 134.1 MG/DL
HBA1C MFR BLD: 6.3 %
HDLC SERPL-MCNC: 59 MG/DL (ref 35–60)
LDLC SERPL CALC-MCNC: 111 MG/DL (ref 50–140)
TRIGL SERPL-MCNC: 83 MG/DL (ref 37–140)
TSH SERPL-ACNC: 1.44 UIU/ML (ref 0.35–4.94)
VLDLC SERPL CALC-MCNC: 17 MG/DL

## 2024-08-15 PROCEDURE — 82306 VITAMIN D 25 HYDROXY: CPT

## 2024-08-15 PROCEDURE — 84443 ASSAY THYROID STIM HORMONE: CPT

## 2024-08-15 PROCEDURE — 80061 LIPID PANEL: CPT

## 2024-08-15 PROCEDURE — 36415 COLL VENOUS BLD VENIPUNCTURE: CPT

## 2024-08-15 PROCEDURE — 99215 OFFICE O/P EST HI 40 MIN: CPT | Mod: PBBFAC

## 2024-08-15 PROCEDURE — 83036 HEMOGLOBIN GLYCOSYLATED A1C: CPT

## 2024-08-15 NOTE — PROGRESS NOTES
"[facility]    Subjective:      Chief Complaint: Establish Care      HPI   Darby Gary is a 84 y.o. female who presents to [facility] to establish care.    Acute concerns  Patient has c/o right leg pain from hip to knee. Received greater trochanter corticosteroid injection on 7/26/24 at urgent care with noticeable improvement of pain. Had similar issues 10-15 years ago which resolved after PT. Denies numbness/tingling, red flag symptoms, recent falls/trauma. MRI R hip on 8/1/24 shows mild degenerative changes with subchondral sclerosis, mild tendinopathy of hamstring tendon at origin, thickening of IT ligament superiorly, and osteitis pubis.     Previously seen by [facility] for primary care. Patient still drives herself and lives by herself, no difficulties with ADLs. Has family that lives in town. Originally from [place]. Does not need medication refills.     Chronic conditions  Pancreatic adenocarcinoma  - Diagnosed 9/11/23  - s/p chemo/radiation and Whipple procedure 6/24/24 by Dr. Pérez  - Creon 36,000 qd  - Follows with Oncology, Dr. Forman    DMII  - Takes metformin 500 mg, only takes if AM CBG is >100  - Denies polydipsia, polyuria, hypoglycemia symptoms    HTN  - Compliant with lisinopril-HCTZ 10-12.5 and 20 mEq K daily  - BP wnl today  - Denies HA, CP, palpitations, angioedema    Problem list  - Pancreatic adenocarcinoma s/p chemo/radiation and whipple procedure  - HTN  - DM II  - HLD  - Vit D def    Surgical Hx  - Whipple procedure  - C-sections x3    Social Hx  - 30 years 1-2 ppd, quit >30 years ago  - No alcohol  - No other drug use    Family Hx  - Dad: leukemia  - Mom: aneurysm of stomach (? aortic aneurysm)    Review of Systems  As per HPI.    Objective:     /74 (BP Location: Right arm, Patient Position: Sitting, BP Method: Medium (Automatic))   Pulse 73   Temp 97.5 °F (36.4 °C) (Oral)   Ht 5' 2" (1.575 m)   Wt 55.8 kg (123 lb)   SpO2 98%   BMI 22.50 kg/m²     Physical " Exam:  Gen: No acute distress, appears younger than stated age, pleasant female  CV: RRR, no murmurs. No LE edema.  Resp: CTAB, breathing non labored on room air.  Abd: Soft, non-distended, non-tender, bowel sounds normoactive.  MSK: ambulates without assistance, antalgic gait on right, no tenderness overlying greater trochanter, unable to reproduce thigh pain on exam, log roll RLE negative, no pain on palpation of paraspinal muscles or bony lumbar spine. Straight leg raise negative. Strength 5/5 and equal bilaterally.      Current Outpatient Medications:     aspirin (ECOTRIN) 81 MG EC tablet, Take 1 tablet (81 mg total) by mouth once daily., Disp: 30 tablet, Rfl: 11    diclofenac sodium (VOLTAREN) 1 % Gel, Apply 2 g topically daily as needed (shoulder pain)., Disp: 100 g, Rfl: 0    ergocalciferol (ERGOCALCIFEROL) 50,000 unit Cap, Take 50,000 Units by mouth every 7 days., Disp: , Rfl:     lipase-protease-amylase (CREON) 36,000-114,000- 180,000 unit CpDR, Take one capsule po tid with meals (Patient taking differently: Take 1 capsule by mouth 3 (three) times daily. Take one capsule po tid with meals), Disp: 50 capsule, Rfl: 6    lisinopriL-hydrochlorothiazide (PRINZIDE,ZESTORETIC) 10-12.5 mg per tablet, Take 1 tablet by mouth once daily., Disp: , Rfl:     metFORMIN (GLUCOPHAGE) 500 MG tablet, Take 500 mg by mouth once daily., Disp: , Rfl:     potassium chloride (KLOR-CON) 10 MEQ TbSR, Take 1 tablet (10 mEq total) by mouth 2 (two) times daily., Disp: 60 tablet, Rfl: 3     Assessment/Plan:     Encounter to establish care  - Labs today: Vit D, A1c, lipid panel (non fasting), TSH  - Reviewed recent CMP and CBC    It band syndrome, right  Right greater trochanteric bursitis, resolved  - Ambulatory referral/consult to Physical/Occupational Therapy; Future  - Continue home stretching exercises  - Tylenol for pain relief    DM II  - A1c ordered  - Metformin 500 mg qd    HTN  - DASH diet and exercise as tolerated discussed  -  Continue lisinopril-HCTZ 10-12.5 mg qd    Health Maintenance  - Pap Smear: last year, negative per patient  - Mammogram: ordered today  - Colon Cancer: Cologuard negative 6/13/23  - DEXA: Reviewed scan from 9/8/2022, no osteopenia or osteoporosis  - Vaccinations: address at next visit    Follow-up: 3 months for HTN, DM II    Ramy Griggs MD   [facility] - PGY-III

## 2024-08-21 DIAGNOSIS — C25.9 PANCREATIC ADENOCARCINOMA: ICD-10-CM

## 2024-08-21 RX ORDER — POTASSIUM CHLORIDE 750 MG/1
10 TABLET, EXTENDED RELEASE ORAL 2 TIMES DAILY
Qty: 60 TABLET | Refills: 1 | Status: SHIPPED | OUTPATIENT
Start: 2024-08-21

## 2024-08-27 ENCOUNTER — TELEPHONE (OUTPATIENT)
Dept: HEMATOLOGY/ONCOLOGY | Facility: CLINIC | Age: 84
End: 2024-08-27
Payer: COMMERCIAL

## 2024-09-10 ENCOUNTER — OFFICE VISIT (OUTPATIENT)
Dept: SURGICAL ONCOLOGY | Facility: CLINIC | Age: 84
End: 2024-09-10
Payer: COMMERCIAL

## 2024-09-10 VITALS
WEIGHT: 124 LBS | DIASTOLIC BLOOD PRESSURE: 78 MMHG | HEIGHT: 62 IN | SYSTOLIC BLOOD PRESSURE: 158 MMHG | BODY MASS INDEX: 22.82 KG/M2 | HEART RATE: 84 BPM

## 2024-09-10 DIAGNOSIS — C25.9 MALIGNANT NEOPLASM OF PANCREAS, UNSPECIFIED LOCATION OF MALIGNANCY: Primary | ICD-10-CM

## 2024-09-10 PROCEDURE — 99999 PR PBB SHADOW E&M-EST. PATIENT-LVL III: CPT | Mod: PBBFAC,,, | Performed by: SURGERY

## 2024-09-10 PROCEDURE — 99024 POSTOP FOLLOW-UP VISIT: CPT | Mod: S$GLB,,, | Performed by: SURGERY

## 2024-09-10 PROCEDURE — 1101F PT FALLS ASSESS-DOCD LE1/YR: CPT | Mod: CPTII,S$GLB,, | Performed by: SURGERY

## 2024-09-10 PROCEDURE — 3077F SYST BP >= 140 MM HG: CPT | Mod: CPTII,S$GLB,, | Performed by: SURGERY

## 2024-09-10 PROCEDURE — 1159F MED LIST DOCD IN RCRD: CPT | Mod: CPTII,S$GLB,, | Performed by: SURGERY

## 2024-09-10 PROCEDURE — 3078F DIAST BP <80 MM HG: CPT | Mod: CPTII,S$GLB,, | Performed by: SURGERY

## 2024-09-10 PROCEDURE — 3288F FALL RISK ASSESSMENT DOCD: CPT | Mod: CPTII,S$GLB,, | Performed by: SURGERY

## 2024-09-10 NOTE — PROGRESS NOTES
Patient returns after undergoing laparoscopic Whipple in late June 2024 after neoadjuvant therapy.  She has done very well postoperatively.  She is tolerating a diet without difficulty, no nausea or vomiting, maintaining her weight.  No dumping symptoms.    On exam she appears well in no apparent distress  Abdomen is soft nontender nondistended, incisions are well healed    Continue routine surveillance with Medical Oncology    Return to clinic in 6 months

## 2024-09-30 ENCOUNTER — OFFICE VISIT (OUTPATIENT)
Dept: ORTHOPEDICS | Facility: CLINIC | Age: 84
End: 2024-09-30
Payer: COMMERCIAL

## 2024-09-30 ENCOUNTER — HOSPITAL ENCOUNTER (OUTPATIENT)
Dept: RADIOLOGY | Facility: HOSPITAL | Age: 84
Discharge: HOME | End: 2024-09-30
Attending: STUDENT IN AN ORGANIZED HEALTH CARE EDUCATION/TRAINING PROGRAM
Payer: COMMERCIAL

## 2024-09-30 VITALS
SYSTOLIC BLOOD PRESSURE: 151 MMHG | BODY MASS INDEX: 23.29 KG/M2 | TEMPERATURE: 99 F | DIASTOLIC BLOOD PRESSURE: 78 MMHG | HEART RATE: 69 BPM | WEIGHT: 126.56 LBS | HEIGHT: 62 IN

## 2024-09-30 DIAGNOSIS — M25.551 RIGHT HIP PAIN: ICD-10-CM

## 2024-09-30 DIAGNOSIS — M76.31 ILIOTIBIAL BAND SYNDROME OF RIGHT SIDE: Primary | ICD-10-CM

## 2024-09-30 PROCEDURE — 99214 OFFICE O/P EST MOD 30 MIN: CPT | Mod: PBBFAC,25 | Performed by: STUDENT IN AN ORGANIZED HEALTH CARE EDUCATION/TRAINING PROGRAM

## 2024-09-30 PROCEDURE — 73502 X-RAY EXAM HIP UNI 2-3 VIEWS: CPT | Mod: TC,RT

## 2024-09-30 RX ORDER — GABAPENTIN 100 MG/1
100 CAPSULE ORAL
COMMUNITY

## 2024-10-07 NOTE — PROGRESS NOTES
"Subjective:    Patient ID: Darby Gary is a 84 y.o. female  who presented to [facility] for new visit..    Chief Complaint: Pain of the Right Hip (Patient presents for right hip pain. States pain level 6/10 right now. States pain is intermittent aching. )    History of Present Illness:  Darby Gary presented today with pain on the lateral aspect of the right thigh that has been going on for the past  involving the right hip / knee for the past 6 months. Pain is located on the lateral aspect of the knee/ hip. Quality of pain is described as Aching and Throbbing.  Inciting event: none known. Pain is aggravated by going up and down stairs.  Patient has had no prior hip problems. Evaluation to date: PCP evaluation. Treatment to date: oral analgesics.      Objective:      Physical Exam:    BP (!) 151/78 (Patient Position: Sitting) Comment: States she got nervous  Pulse 69   Temp 98.6 °F (37 °C)   Ht 5' 2" (1.575 m)   Wt 57.4 kg (126 lb 8.7 oz)   BMI 23.15 kg/m²     Appearance:  Normal gait/station  FWB  Alignment: Left: normal Right: normal   Soft tissue swelling: Left: no Right: no  Effusion: Left:  Negative Right: Negative  Erythema: Left no Right: no  Ecchymosis: Left: no Right: no  Atrophy: Left: no Right: no    Palpation:  Hip/Back Tenderness: Left: None Right: None     Range of motion:  HIP  Flexion (135): Left: 135 Right: 135  Extension (30): Left: 30 Right: 30  Abduction (45-50): Left: 45 Right: 45  Adduction (20-30): Left: 20 Right: 20  Internal Rotation (35): Left: 35  Right: 35  External Rotation (45):Left: 45 Right: 45  Prone ER(45): Left: 45 Right: 45     Strength:  Extension: Left 5/5  Pain: No     Right 5/5 Pain: No  Flexion: Left 5/5 Pain: No Right   5/5 Pain: No  Abduction: Left 5/5  Pain: No     Right 5/5 Pain: No  Adduction: Left 5/5  Pain: No     Right 5/5 Pain: No  External Rotation: Left 5/5  Pain: No     Right 5/5 Pain: No  Internal " Rotation: Left 5/5  Pain: No     Right 5/5 Pain: No    Special Tests:  Log Roll: Left: Not performed Right: Not performed  FADIR: Left: Not performed Right: Not performed  ARELY: Left: Not performed Right: Not performed  Trendelenburg test: Left: Not performed Right: Not performed  Dot: Left: Not performed Right: Positive   Hari Test: Left: Not performed Right: Not performed  Axial load & distraction: Left: Not performed Right: Not performed  Straight Leg Raise Left: Not performed Right: Not performed    General appearance: NAD  Peripheral pulses: normal bilaterally   Reflexes: Left: normal Right normal   Sensation: normal    Labs:  Last A1c: 6.3     Imaging:   Previous images reviewed.         Assessment:        Encounter Diagnoses   Name Primary?    Right hip pain Yes    Iliotibial band syndrome of right side         Plan:      Dx: right IT band syndrome Acute in moderate exacerbation.   Treatment Plan: Discussed with patient diagnosis, prognosis, and treatment recommendations. Education provided.    Imaging: radiological studies ordered and independently reviewed; discussed with patient; pending radiologist interpretation.   Weight Management: is paramount. Maintain healthy weight of a BMI of <24.9..   Procedure: Discussed CSI/VSI as treatment options; discussed injections as treatment options in future if conservative measures do not improve symptoms.  Activity: Activity as tolerated; HEP to include aerobic conditioning and strength training with non-painful activity. ROM/STG exercises. Proper footware; assistive devises to avoid limping.   Therapy: Physical Therapy  Medication: START over-the-counter acetaminophen (Tylenol 1000 mg three times per day as needed). Please see your primary care physician for further refills.  RTC: 3 months.         This note is dictated using the M*Modal Fluency Direct word recognition program. There are word recognition mistakes that are occasionally missed on review.    Cristal  MD Angela

## 2024-10-11 ENCOUNTER — HOSPITAL ENCOUNTER (OUTPATIENT)
Dept: RADIOLOGY | Facility: HOSPITAL | Age: 84
Discharge: HOME | End: 2024-10-11
Attending: INTERNAL MEDICINE
Payer: COMMERCIAL

## 2024-10-11 DIAGNOSIS — C25.9 PANCREATIC ADENOCARCINOMA: ICD-10-CM

## 2024-10-11 DIAGNOSIS — C77.2 MALIGNANT NEOPLASM METASTATIC TO INTRA-ABDOMINAL LYMPH NODE: ICD-10-CM

## 2024-10-11 LAB
CREAT SERPL-MCNC: 0.8 MG/DL (ref 0.5–1.4)
SAMPLE: NORMAL

## 2024-10-11 PROCEDURE — 74177 CT ABD & PELVIS W/CONTRAST: CPT | Mod: TC

## 2024-10-11 PROCEDURE — 71260 CT THORAX DX C+: CPT | Mod: TC

## 2024-10-11 PROCEDURE — 25500020 PHARM REV CODE 255: Performed by: INTERNAL MEDICINE

## 2024-10-11 RX ADMIN — IOHEXOL 100 ML: 350 INJECTION, SOLUTION INTRAVENOUS at 09:10

## 2024-10-16 NOTE — PROGRESS NOTES
Subjective:       Patient ID: Darby Gary is a 84 y.o. female.    Chief Complaint: No chief complaint on file.      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  This was staged using CT scan.  Patient had surgery on 06/24/2024, pathology revealed well-differentiated pancreatic ductal adenocarcinoma measuring 2.6 cm with clear margins, there were 24 lymph nodes removed all negative for malignancy, final pathologic stage was yp T2, N0    Current Treatment:   Observation    Treatment History:  ERCP on 09/13/2023  Modified FOLFIRINOX without the 5FU bolus.  This treatment was started on 10/10/2023  Whipple done on 06/24/2024    HPI:  84-year-old female was seen by her nephrologist, blood work revealed elevated liver enzymes and bilirubin, she was instructed to present to the emergency department on 09/11/2023.  She presented to [facility] on 09/11/2023.  CMP in the emergency department revealed an ALT of 233, AST of 263, alkaline phosphatase of 1113, and a bilirubin of 2.3.  Hepatitis panel was done and was negative, lipase was also within normal limits.  A CEA was normal at 1.96, CA 19 9 was extremely elevated at 2461.89.  The patient underwent a CT scan of the abdomen and pelvis with contrast on 09/11/2023 that revealed suspected obstructive pancreatic head mass measuring 2.2 cm with moderate dilation of the biliary ductal system and severe dilation of the pancreatic duct.  There was 1-2 possible regional enlarged lymph nodes in the periportal region.  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  A biliary sphincterotomy was performed and 1 uncovered metal biliary stent was placed.  Brushings were obtained, cytology revealed adenocarcinoma.  She was discharged home in stable condition on 09/14/2023.  The patient was  referred to Medical Oncology.  I saw the patient on 09/25/2023.  She stated that she was feeling okay.  She had no major issues to discuss.  She was seen by Dr. Chucky Olguin on 10/03/2023, underwent diagnostic laparoscopy and port placement on 10/10/2023.  No metastatic disease seen on diagnostic laparoscopy.  Started modified FOLFIRINOX on 10/10/2023.    Cycle 12 given on 03/19/2024; Surgery delayed due to insurance denial; Cycle 13 given on 05/22/2024     CT scan of the chest, abdomen, and pelvis done on 06/12/2024 showed a mass lesion at the head of the pancreas that had decreased in size with no CT evidence of metastatic disease.    Whipple done on 06/24/2024, pathology revealed well-differentiated pancreatic ductal adenocarcinoma measuring 2.6 cm with clear margins, there were 24 lymph nodes removed all negative for malignancy, final pathologic stage was yp T2, N0.    Most recent CT scans done on 10/11/2024 showed postoperative changes following whipple procedure with no evidence of recurrent or metastatic disease in the chest abdomen and pelvis.  CA 19-9 done on same day now at 10.80    Interval History:   Patient is here today for follow up visit.  Overall, she was doing well.  She was ecstatic with her scan results.      Past Medical History:   Diagnosis Date    Arthritis     Diabetes mellitus     Hyperlipidemia     Hypertension     Kidney cysts     Neuropathy     Osteoporosis     Pancreatic cancer     Taste sense altered     Weight loss, unintentional       Past Surgical History:   Procedure Laterality Date    CATARACT EXTRACTION Bilateral     COLONOSCOPY      DIAGNOSTIC LAPAROSCOPY N/A 10/09/2023    Procedure: LAPAROSCOPY, DIAGNOSTIC  Req 12/12:30;  Surgeon: Chucky Olguin MD;  Location[facility];  Service: General;  Laterality: N/A;    ERCP N/A 09/13/2023    Procedure: ERCP (ENDOSCOPIC RETROGRADE CHOLANGIOPANCREATOGRAPHY)/ cytology brushing;  Surgeon: Erika Ulloa MD;  Location: [facility] ENDOSCOPY;   Service: Gastroenterology;  Laterality: N/A;    ERCP W/ SPHICTEROTOMY  09/13/2023    Procedure: ERCP, WITH SPHINCTEROTOMY;  Surgeon: Erika Ulloa MD;  Location: [facility] ENDOSCOPY;  Service: Gastroenterology;;    ERCP, WITH STENT INSERTION  09/13/2023    Procedure: ERCP, WITH STENT INSERTION;  Surgeon: Erika Ulloa MD;  Location: [facility] ENDOSCOPY;  Service: Gastroenterology;;    ESOPHAGOGASTRODUODENOSCOPY N/A 09/13/2023    Procedure: EGD (ESOPHAGOGASTRODUODENOSCOPY);  Surgeon: Erika Ulloa MD;  Location: [facility] ENDOSCOPY;  Service: Gastroenterology;  Laterality: N/A;    PLACEMENT, MEDIPORT Right 10/09/2023    Procedure: PLACEMENT, MEDIPORT;  Surgeon: Chucky Olguin MD;  Location: [facility] OR;  Service: General;  Laterality: Right;    POLYPECTOMY      uterus and colon    ROBOT-ASSISTED LAPAROSCOPIC WHIPPLE PROCEDURE USING DA DAVID XI N/A 6/24/2024    Procedure: XI ROBOTIC WHIPPLE PROCEDURE;  Surgeon: Chucky Olguin MD;  Location: [facility];  Service: General;  Laterality: N/A;  *     Social History     Socioeconomic History    Marital status:    Tobacco Use    Smoking status: Former     Types: Cigarettes    Smokeless tobacco: Never    Tobacco comments:     Quit over 25 years ago.    Substance and Sexual Activity    Alcohol use: Never    Drug use: Never    Sexual activity: Not Currently     Social Drivers of Health     Financial Resource Strain: High Risk (9/13/2023)    Overall Financial Resource Strain (CARDIA)     Difficulty of Paying Living Expenses: Hard   Food Insecurity: Unknown (6/25/2024)    Hunger Vital Sign     Worried About Running Out of Food in the Last Year: Never true   Recent Concern: Food Insecurity - Food Insecurity Present (6/25/2024)    Hunger Vital Sign     Worried About Running Out of Food in the Last Year: Never true     Ran Out of Food in the Last Year: Sometimes true   Transportation Needs: No Transportation Needs (6/25/2024)    TRANSPORTATION NEEDS     Transportation :  No   Physical Activity: Inactive (9/13/2023)    Exercise Vital Sign     Days of Exercise per Week: 0 days     Minutes of Exercise per Session: 0 min   Stress: No Stress Concern Present (9/13/2023)    [employer] - Occupational Stress Questionnaire     Feeling of Stress : Only a little   Housing Stability: Low Risk  (9/13/2023)    Housing Stability Vital Sign     Unable to Pay for Housing in the Last Year: No     Number of Places Lived in the Last Year: 1     Unstable Housing in the Last Year: No      Family History   Problem Relation Name Age of Onset    Leukemia Father      Prostate cancer Brother      Breast cancer Other niece 48    Breast cancer Maternal Aunt      Breast cancer Maternal Aunt      Breast cancer Maternal Aunt      Cancer Paternal Uncle        Review of patient's allergies indicates:  No Known Allergies   Review of Systems   Constitutional:  Negative for chills, diaphoresis, fatigue, fever and unexpected weight change.   HENT:  Negative for nasal congestion, mouth sores, sinus pressure/congestion and sore throat.    Eyes:  Negative for pain and visual disturbance.   Respiratory:  Negative for cough, chest tightness and shortness of breath.    Cardiovascular:  Negative for chest pain, palpitations and leg swelling.   Gastrointestinal:  Negative for abdominal distention, abdominal pain, blood in stool, constipation and diarrhea.   Genitourinary:  Negative for dysuria, frequency and hematuria.   Musculoskeletal:  Negative for arthralgias and back pain.   Integumentary:  Negative for rash.   Neurological:  Positive for numbness. Negative for dizziness, weakness and headaches.   Hematological:  Negative for adenopathy.   Psychiatric/Behavioral:  Negative for confusion.          Objective:      Physical Exam  Vitals reviewed.   Constitutional:       General: She is not in acute distress.     Appearance: Normal appearance.   HENT:      Head: Normocephalic and atraumatic.       Nose: Nose normal.      Mouth/Throat:      Mouth: Mucous membranes are moist.   Eyes:      Extraocular Movements: Extraocular movements intact.      Conjunctiva/sclera: Conjunctivae normal.   Cardiovascular:      Rate and Rhythm: Normal rate and regular rhythm.      Pulses: Normal pulses.      Heart sounds: Normal heart sounds.   Pulmonary:      Effort: Pulmonary effort is normal.      Breath sounds: Normal breath sounds.   Musculoskeletal:         General: No swelling. Normal range of motion.      Cervical back: Normal range of motion and neck supple.      Right lower leg: No edema.      Left lower leg: No edema.   Skin:     General: Skin is warm and dry.   Neurological:      General: No focal deficit present.      Mental Status: She is alert and oriented to person, place, and time. Mental status is at baseline.   Psychiatric:         Mood and Affect: Mood normal.         Behavior: Behavior normal.       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  This was staged using CT scan.        Plan:       Patient has at least stage IIB pancreatic cancer based on CT abdomen/pelvis measurements.     PET/CT scan on 10/05/2023- No FDG avid metastatic disease identified. Focal uptake in the pancreatic head about a CBD stent compatible with patient's primary malignancy.    Patient saw Dr. Chucky Olguin on 10/03/2023, underwent port placement and diagnostic laparoscopy on 10/09/2023.  No metastatic disease seen.  Started modified FOLFIRINOX on 10/10/2023.  Oxaliplatin discontinued with cycle 11.    Cycle 12 given on 03/19/2024; Surgery delayed due to insurance denial; Cycle 13 given on 05/22/2024     Whipple done on 06/24/2024    She has met with Genetics and results were negative.    MRI of right hip done on 08/01/2024 showed no evidence of disease.    Most recent CT scans done on 10/11/2024 showed postoperative changes following  whipple procedure with no evidence of recurrent or metastatic disease in the chest abdomen and pelvis. CA 19-9 done on same day now at 10.80    Okay to continue Tramadol and Lyrica.    RTC in 3 months with labs and CT scans.    We will also refer to nutrition for help with Creon pay assistance.    CBC, CMP, CA 19-9    Javed Forman II, MD

## 2024-10-17 ENCOUNTER — OFFICE VISIT (OUTPATIENT)
Dept: HEMATOLOGY/ONCOLOGY | Facility: CLINIC | Age: 84
End: 2024-10-17
Payer: COMMERCIAL

## 2024-10-17 VITALS
DIASTOLIC BLOOD PRESSURE: 71 MMHG | WEIGHT: 125 LBS | TEMPERATURE: 98 F | RESPIRATION RATE: 18 BRPM | SYSTOLIC BLOOD PRESSURE: 139 MMHG | HEART RATE: 70 BPM | OXYGEN SATURATION: 98 % | BODY MASS INDEX: 23 KG/M2 | HEIGHT: 62 IN

## 2024-10-17 DIAGNOSIS — C25.9 PANCREATIC ADENOCARCINOMA: Primary | ICD-10-CM

## 2024-10-17 DIAGNOSIS — C77.2 MALIGNANT NEOPLASM METASTATIC TO INTRA-ABDOMINAL LYMPH NODE: ICD-10-CM

## 2024-10-17 PROCEDURE — 3075F SYST BP GE 130 - 139MM HG: CPT | Mod: CPTII,S$GLB,, | Performed by: INTERNAL MEDICINE

## 2024-10-17 PROCEDURE — 99999 PR PBB SHADOW E&M-EST. PATIENT-LVL IV: CPT | Mod: PBBFAC,,, | Performed by: INTERNAL MEDICINE

## 2024-10-17 PROCEDURE — 3288F FALL RISK ASSESSMENT DOCD: CPT | Mod: CPTII,S$GLB,, | Performed by: INTERNAL MEDICINE

## 2024-10-17 PROCEDURE — 3078F DIAST BP <80 MM HG: CPT | Mod: CPTII,S$GLB,, | Performed by: INTERNAL MEDICINE

## 2024-10-17 PROCEDURE — 99214 OFFICE O/P EST MOD 30 MIN: CPT | Mod: S$GLB,,, | Performed by: INTERNAL MEDICINE

## 2024-10-17 PROCEDURE — 1159F MED LIST DOCD IN RCRD: CPT | Mod: CPTII,S$GLB,, | Performed by: INTERNAL MEDICINE

## 2024-10-17 PROCEDURE — 1160F RVW MEDS BY RX/DR IN RCRD: CPT | Mod: CPTII,S$GLB,, | Performed by: INTERNAL MEDICINE

## 2024-10-17 PROCEDURE — 1101F PT FALLS ASSESS-DOCD LE1/YR: CPT | Mod: CPTII,S$GLB,, | Performed by: INTERNAL MEDICINE

## 2024-10-21 DIAGNOSIS — C25.9 PANCREATIC ADENOCARCINOMA: ICD-10-CM

## 2024-10-21 RX ORDER — POTASSIUM CHLORIDE 750 MG/1
10 TABLET, EXTENDED RELEASE ORAL 2 TIMES DAILY
Qty: 60 TABLET | Refills: 1 | Status: SHIPPED | OUTPATIENT
Start: 2024-10-21

## 2024-10-22 DIAGNOSIS — C25.9 PANCREATIC ADENOCARCINOMA: ICD-10-CM

## 2024-10-22 RX ORDER — PANCRELIPASE 36000; 180000; 114000 [USP'U]/1; [USP'U]/1; [USP'U]/1
CAPSULE, DELAYED RELEASE PELLETS ORAL
Qty: 50 CAPSULE | Refills: 6 | Status: SHIPPED | OUTPATIENT
Start: 2024-10-22

## 2024-10-23 ENCOUNTER — HOSPITAL ENCOUNTER (EMERGENCY)
Facility: HOSPITAL | Age: 84
Discharge: HOME | End: 2024-10-23
Attending: EMERGENCY MEDICINE
Payer: COMMERCIAL

## 2024-10-23 ENCOUNTER — CLINICAL SUPPORT (OUTPATIENT)
Dept: URGENT CARE | Facility: CLINIC | Age: 84
End: 2024-10-23
Payer: COMMERCIAL

## 2024-10-23 VITALS
RESPIRATION RATE: 18 BRPM | SYSTOLIC BLOOD PRESSURE: 139 MMHG | HEIGHT: 62 IN | WEIGHT: 125 LBS | HEART RATE: 80 BPM | TEMPERATURE: 98 F | OXYGEN SATURATION: 99 % | BODY MASS INDEX: 23 KG/M2 | DIASTOLIC BLOOD PRESSURE: 75 MMHG

## 2024-10-23 DIAGNOSIS — M62.838 MUSCLE SPASM: Primary | ICD-10-CM

## 2024-10-23 DIAGNOSIS — R07.9 CHEST PAIN, UNSPECIFIED TYPE: Primary | ICD-10-CM

## 2024-10-23 DIAGNOSIS — R07.9 INTERMITTENT CHEST PAIN: ICD-10-CM

## 2024-10-23 LAB
ALBUMIN SERPL-MCNC: 3.8 G/DL (ref 3.4–4.8)
ALBUMIN/GLOB SERPL: 1.2 RATIO (ref 1.1–2)
ALP SERPL-CCNC: 72 UNIT/L (ref 40–150)
ALT SERPL-CCNC: 25 UNIT/L (ref 0–55)
ANION GAP SERPL CALC-SCNC: 7 MEQ/L
AST SERPL-CCNC: 27 UNIT/L (ref 5–34)
BASOPHILS # BLD AUTO: 0.04 X10(3)/MCL
BASOPHILS NFR BLD AUTO: 0.5 %
BILIRUB SERPL-MCNC: 0.3 MG/DL
BNP BLD-MCNC: 35.4 PG/ML
BUN SERPL-MCNC: 19.9 MG/DL (ref 9.8–20.1)
CALCIUM SERPL-MCNC: 9.2 MG/DL (ref 8.4–10.2)
CHLORIDE SERPL-SCNC: 106 MMOL/L (ref 98–107)
CO2 SERPL-SCNC: 28 MMOL/L (ref 23–31)
CREAT SERPL-MCNC: 1.04 MG/DL (ref 0.55–1.02)
CREAT/UREA NIT SERPL: 19
EOSINOPHIL # BLD AUTO: 0.31 X10(3)/MCL (ref 0–0.9)
EOSINOPHIL NFR BLD AUTO: 3.9 %
ERYTHROCYTE [DISTWIDTH] IN BLOOD BY AUTOMATED COUNT: 13.6 % (ref 11.5–17)
GFR SERPLBLD CREATININE-BSD FMLA CKD-EPI: 54 ML/MIN/1.73/M2
GLOBULIN SER-MCNC: 3.2 GM/DL (ref 2.4–3.5)
GLUCOSE SERPL-MCNC: 101 MG/DL (ref 82–115)
HCT VFR BLD AUTO: 35.9 % (ref 37–47)
HGB BLD-MCNC: 11.8 G/DL (ref 12–16)
HOLD SPECIMEN: NORMAL
IMM GRANULOCYTES # BLD AUTO: 0.01 X10(3)/MCL (ref 0–0.04)
IMM GRANULOCYTES NFR BLD AUTO: 0.1 %
LYMPHOCYTES # BLD AUTO: 2.01 X10(3)/MCL (ref 0.6–4.6)
LYMPHOCYTES NFR BLD AUTO: 25.3 %
MCH RBC QN AUTO: 30.6 PG (ref 27–31)
MCHC RBC AUTO-ENTMCNC: 32.9 G/DL (ref 33–36)
MCV RBC AUTO: 93 FL (ref 80–94)
MONOCYTES # BLD AUTO: 0.65 X10(3)/MCL (ref 0.1–1.3)
MONOCYTES NFR BLD AUTO: 8.2 %
NEUTROPHILS # BLD AUTO: 4.91 X10(3)/MCL (ref 2.1–9.2)
NEUTROPHILS NFR BLD AUTO: 62 %
NRBC BLD AUTO-RTO: 0 %
PLATELET # BLD AUTO: 243 X10(3)/MCL (ref 130–400)
PMV BLD AUTO: 9.7 FL (ref 7.4–10.4)
POTASSIUM SERPL-SCNC: 3.8 MMOL/L (ref 3.5–5.1)
PROT SERPL-MCNC: 7 GM/DL (ref 5.8–7.6)
RBC # BLD AUTO: 3.86 X10(6)/MCL (ref 4.2–5.4)
SODIUM SERPL-SCNC: 141 MMOL/L (ref 136–145)
TROPONIN I SERPL-MCNC: <0.01 NG/ML (ref 0–0.04)
TROPONIN I SERPL-MCNC: <0.01 NG/ML (ref 0–0.04)
WBC # BLD AUTO: 7.93 X10(3)/MCL (ref 4.5–11.5)

## 2024-10-23 PROCEDURE — 80053 COMPREHEN METABOLIC PANEL: CPT | Performed by: PHYSICIAN ASSISTANT

## 2024-10-23 PROCEDURE — 83880 ASSAY OF NATRIURETIC PEPTIDE: CPT | Performed by: PHYSICIAN ASSISTANT

## 2024-10-23 PROCEDURE — 93005 ELECTROCARDIOGRAM TRACING: CPT

## 2024-10-23 PROCEDURE — 85025 COMPLETE CBC W/AUTO DIFF WBC: CPT | Performed by: PHYSICIAN ASSISTANT

## 2024-10-23 PROCEDURE — 99285 EMERGENCY DEPT VISIT HI MDM: CPT | Mod: 25

## 2024-10-23 PROCEDURE — 84484 ASSAY OF TROPONIN QUANT: CPT | Performed by: PHYSICIAN ASSISTANT

## 2024-10-23 RX ORDER — CYCLOBENZAPRINE HCL 5 MG
5 TABLET ORAL 3 TIMES DAILY PRN
Qty: 12 TABLET | Refills: 0 | Status: SHIPPED | OUTPATIENT
Start: 2024-10-23

## 2024-10-23 NOTE — PROGRESS NOTES
Called to the lobby to see 84-year-old patient complaining of intermittent chest pain.  No shortness of breath or diaphoresis.  Vitals show afebrile,   /77, pulse 79, pulse ox 98%.  Will transport to the ER for further eval

## 2024-10-24 ENCOUNTER — TELEPHONE (OUTPATIENT)
Dept: HEMATOLOGY/ONCOLOGY | Facility: CLINIC | Age: 84
End: 2024-10-24
Payer: COMMERCIAL

## 2024-10-24 LAB
OHS QRS DURATION: 84 MS
OHS QTC CALCULATION: 436 MS

## 2024-10-24 NOTE — TELEPHONE ENCOUNTER
Called and spoke to pt on 10/21/24 re: Creon patient assistance application. Advised paperwork would be printed and put at  for pickup. She did call back 10/24/24 with further questions and stated she would be bringing completed paperwork. Advised that we can send paperwork once we have her signed portion of the application. Also advised that application processing may take time and is not done through [facility] system, it is completed through third party [employer]. Pt verbalized understanding.

## 2024-11-12 NOTE — PROGRESS NOTES
"[facility]    Subjective:      Chief Complaint: Follow-up (The patient states that she did take her high blood pressure medication this morning but not for diabetes as of yet. C/O right leg inflammation and rt/lft arm pain. Would like to discuss medication for inflammation of her right leg.)      HPI   Darby Gary is a 84 y.o. female who presents to [facility] for HTN.    C/o  Patient continues to have R lateral thigh pain, following with [facility]. Undergoing physical therapy 3x per week, improving significantly, now ambulatory without assistance (using walker prior). Patient also follows with [facility] for primary care, trying to decide which place she wants to stay.     Pancreatic adenocarcinoma  - Diagnosed 9/11/23  - s/p chemo/radiation and Whipple procedure 6/24/24 by Dr. Pérez  - Creon 36,000 qd  - Follows with Oncology, Dr. Forman     Pre-DM  - Takes metformin 500 mg  - Glucose generally in 120s, had one value of 200 after eating a bowl of cereal  - Denies polydipsia, polyuria, hypoglycemia symptoms     HTN  - Compliant with lisinopril-HCTZ 10-12.5 and 20 mEq K daily  - BP wnl today  - Denies HA, CP, palpitations, LE edema     Problem list  - Pancreatic adenocarcinoma s/p chemo/radiation and whipple procedure  - HTN  - Pre-DM  - HLD  - Vit D def    Review of Systems  As per HPI.    Objective:     /72 (BP Location: Left arm)   Pulse 85   Temp 97.9 °F (36.6 °C) (Oral)   Resp 18   Ht 5' 2" (1.575 m)   Wt 57.2 kg (126 lb)   SpO2 96%   BMI 23.05 kg/m²     Physical Exam:  Gen: No acute distress, pleasant female, frail appearing  CV: RRR, no murmurs. No LE edema.  Resp: CTAB, breathing non labored on room air.  Abd: Soft, non-distended, non-tender, bowel sounds normoactive.  MSK: ambulating without assistance    Current Outpatient Medications   Medication Instructions    aspirin (ECOTRIN) 81 mg, Oral, Daily    diclofenac sodium (VOLTAREN) 2 g, Topical (Top), Daily PRN    ergocalciferol " (ERGOCALCIFEROL) 50,000 Units, Every 7 days    gabapentin (NEURONTIN) 100 mg    lipase-protease-amylase (CREON) 36,000-114,000- 180,000 unit CpDR Take one capsule po tid with meals    lisinopriL-hydrochlorothiazide (PRINZIDE,ZESTORETIC) 10-12.5 mg per tablet 1 tablet, Oral, Daily    metFORMIN (GLUCOPHAGE) 500 mg, Oral, Daily    potassium chloride (KLOR-CON) 10 MEQ TbSR 10 mEq, Oral, 2 times daily    potassium chloride SA (K-DUR,KLOR-CON M) 10 MEQ tablet 10 mEq, Oral, 2 times daily    pregabalin (LYRICA) 25 MG capsule 1 capsule, 3 times daily       Assessment/Plan:     Hypertension  - DASH diet discussed  - Continue lisinopril-HCTZ 10-12.5 mg qd  - K has been wnl for the majority of labs    2. Impaired glucose tolerance  - Continue low sugar diet  - Continue metofrmin 500 mg qd  - Advised patient that it would be beneficial to choose which PCP she would like to continue seeing, also follows with [facility]  - Pap Smear: negative in 2023 per patient  - Mammogram: no suspicious masses, scattered fibroglandular densities (10/14/24)  - Colon Cancer: Cologuard negative 6/13/23  - DEXA: 10/14/2024 no osteopenia or osteoporosis  - Vaccinations: informed refusal    Follow-up: 3 months for HTN, Pre-DM    Ramy Griggs MD   [facility] - PGY-III

## 2024-11-15 ENCOUNTER — OFFICE VISIT (OUTPATIENT)
Dept: FAMILY MEDICINE | Facility: CLINIC | Age: 84
End: 2024-11-15
Payer: COMMERCIAL

## 2024-11-15 VITALS
DIASTOLIC BLOOD PRESSURE: 72 MMHG | WEIGHT: 126 LBS | SYSTOLIC BLOOD PRESSURE: 138 MMHG | TEMPERATURE: 98 F | OXYGEN SATURATION: 96 % | HEIGHT: 62 IN | HEART RATE: 85 BPM | RESPIRATION RATE: 18 BRPM | BODY MASS INDEX: 23.19 KG/M2

## 2024-11-15 DIAGNOSIS — I10 HYPERTENSION, UNSPECIFIED TYPE: Primary | ICD-10-CM

## 2024-11-15 DIAGNOSIS — R73.02 IMPAIRED GLUCOSE TOLERANCE: ICD-10-CM

## 2024-11-15 PROCEDURE — 99214 OFFICE O/P EST MOD 30 MIN: CPT | Mod: PBBFAC

## 2024-11-15 RX ORDER — PREGABALIN 25 MG/1
1 CAPSULE ORAL 3 TIMES DAILY
COMMUNITY
Start: 2024-10-21

## 2024-11-15 RX ORDER — LISINOPRIL AND HYDROCHLOROTHIAZIDE 10; 12.5 MG/1; MG/1
1 TABLET ORAL DAILY
Qty: 90 TABLET | Refills: 1 | Status: SHIPPED | OUTPATIENT
Start: 2024-11-15

## 2024-11-15 RX ORDER — METFORMIN HYDROCHLORIDE 500 MG/1
500 TABLET ORAL DAILY
Qty: 90 TABLET | Refills: 1 | Status: SHIPPED | OUTPATIENT
Start: 2024-11-15

## 2024-12-19 DIAGNOSIS — C25.9 PANCREATIC ADENOCARCINOMA: ICD-10-CM

## 2024-12-19 RX ORDER — POTASSIUM CHLORIDE 750 MG/1
10 TABLET, EXTENDED RELEASE ORAL 2 TIMES DAILY
Qty: 60 TABLET | Refills: 1 | Status: SHIPPED | OUTPATIENT
Start: 2024-12-19

## 2025-01-08 ENCOUNTER — HOSPITAL ENCOUNTER (OUTPATIENT)
Dept: RADIOLOGY | Facility: HOSPITAL | Age: 85
Discharge: HOME | End: 2025-01-08
Attending: INTERNAL MEDICINE
Payer: COMMERCIAL

## 2025-01-08 DIAGNOSIS — C25.9 PANCREATIC ADENOCARCINOMA: ICD-10-CM

## 2025-01-08 DIAGNOSIS — C77.2 MALIGNANT NEOPLASM METASTATIC TO INTRA-ABDOMINAL LYMPH NODE: ICD-10-CM

## 2025-01-08 PROCEDURE — 74177 CT ABD & PELVIS W/CONTRAST: CPT | Mod: TC

## 2025-01-08 PROCEDURE — 25500020 PHARM REV CODE 255: Performed by: INTERNAL MEDICINE

## 2025-01-08 RX ORDER — DIATRIZOATE MEGLUMINE AND DIATRIZOATE SODIUM 660; 100 MG/ML; MG/ML
30 SOLUTION ORAL; RECTAL
Status: COMPLETED | OUTPATIENT
Start: 2025-01-08 | End: 2025-01-08

## 2025-01-08 RX ADMIN — DIATRIZOATE MEGLUMINE AND DIATRIZOATE SODIUM 30 ML: 660; 100 LIQUID ORAL; RECTAL at 09:01

## 2025-01-08 RX ADMIN — IOHEXOL 75 ML: 350 INJECTION, SOLUTION INTRAVENOUS at 09:01

## 2025-01-14 NOTE — PROGRESS NOTES
Subjective:       Patient ID: Darby Gary is a 84 y.o. female.    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  This was staged using CT scan.  Patient had surgery on 06/24/2024, pathology revealed well-differentiated pancreatic ductal adenocarcinoma measuring 2.6 cm with clear margins, there were 24 lymph nodes removed all negative for malignancy, final pathologic stage was yp T2, N0    Current Treatment:   Observation    Treatment History:  ERCP on 09/13/2023  Modified FOLFIRINOX without the 5FU bolus.  This treatment was started on 10/10/2023  Whipple done on 06/24/2024    HPI:  84 y.o. female was seen by her nephrologist, blood work revealed elevated liver enzymes and bilirubin, she was instructed to present to the emergency department on 09/11/2023.  She presented to [facility] on 09/11/2023.  CMP in the emergency department revealed an ALT of 233, AST of 263, alkaline phosphatase of 1113, and a bilirubin of 2.3.  Hepatitis panel was done and was negative, lipase was also within normal limits.  A CEA was normal at 1.96, CA 19 9 was extremely elevated at 2461.89.  The patient underwent a CT scan of the abdomen and pelvis with contrast on 09/11/2023 that revealed suspected obstructive pancreatic head mass measuring 2.2 cm with moderate dilation of the biliary ductal system and severe dilation of the pancreatic duct.  There was 1-2 possible regional enlarged lymph nodes in the periportal region.  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  A biliary sphincterotomy was performed and 1 uncovered metal biliary stent was placed.  Brushings were obtained, cytology revealed adenocarcinoma.  She was discharged home in stable condition on 09/14/2023.  The  patient was referred to Medical Oncology.  I saw the patient on 09/25/2023.  She stated that she was feeling okay.  She had no major issues to discuss.  She was seen by Dr. Chucky Olguin on 10/03/2023, underwent diagnostic laparoscopy and port placement on 10/10/2023.  No metastatic disease seen on diagnostic laparoscopy.  Started modified FOLFIRINOX on 10/10/2023.    Cycle 12 given on 03/19/2024; Surgery delayed due to insurance denial; Cycle 13 given on 05/22/2024     CT scan of the chest, abdomen, and pelvis done on 06/12/2024 showed a mass lesion at the head of the pancreas that had decreased in size with no CT evidence of metastatic disease.    Whipple done on 06/24/2024, pathology revealed well-differentiated pancreatic ductal adenocarcinoma measuring 2.6 cm with clear margins, there were 24 lymph nodes removed all negative for malignancy, final pathologic stage was yp T2, N0.    CT scans done on 10/11/2024 showed postoperative changes following whipple procedure with no evidence of recurrent or metastatic disease in the chest abdomen and pelvis.  CA 19-9 done on same day now at 10.80.    Most recent CT scans done on 01/08/2025 showed a 7 mm right middle lobe lung nodule, after review with Radiology this looked to be inflammatory.  There was no other evidence of disease. CA 19-9 done on same day now at 11.07.    Interval History:   Patient is here today for follow up visit.  Overall, she was doing well.  She states that she is eating well.  She has no GI issues.  She denies any pain.  She has no major concerns.      Past Medical History:   Diagnosis Date    Hyperlipidemia     Hypertension     Kidney cysts       Past Surgical History:   Procedure Laterality Date    CATARACT EXTRACTION Bilateral     COLONOSCOPY      DIAGNOSTIC LAPAROSCOPY N/A 10/09/2023    Procedure: LAPAROSCOPY, DIAGNOSTIC  Req 12/12:30;  Surgeon: Chucky Olguin MD;  Location: [facility];  Service: General;  Laterality: N/A;    ERCP N/A  09/13/2023    Procedure: ERCP (ENDOSCOPIC RETROGRADE CHOLANGIOPANCREATOGRAPHY)/ cytology brushing;  Surgeon: Erika Ulloa MD;  Location: [facility] ENDOSCOPY;  Service: Gastroenterology;  Laterality: N/A;    ERCP W/ SPHICTEROTOMY  09/13/2023    Procedure: ERCP, WITH SPHINCTEROTOMY;  Surgeon: Erika Ulloa MD;  Location: [facility] ENDOSCOPY;  Service: Gastroenterology;;    ERCP, WITH STENT INSERTION  09/13/2023    Procedure: ERCP, WITH STENT INSERTION;  Surgeon: Erika Ulloa MD;  Location: [facility] ENDOSCOPY;  Service: Gastroenterology;;    ESOPHAGOGASTRODUODENOSCOPY N/A 09/13/2023    Procedure: EGD (ESOPHAGOGASTRODUODENOSCOPY);  Surgeon: Erika Ulloa MD;  Location: [facility] ENDOSCOPY;  Service: Gastroenterology;  Laterality: N/A;    PLACEMENT, MEDIPORT Right 10/09/2023    Procedure: PLACEMENT, [facility];  Surgeon: Chucky Olguin MD;  Location: [facility];  Service: General;  Laterality: Right;    POLYPECTOMY      uterus and colon    ROBOT-ASSISTED LAPAROSCOPIC WHIPPLE PROCEDURE USING DA DAVID XI N/A 6/24/2024    Procedure: XI ROBOTIC WHIPPLE PROCEDURE;  Surgeon: Chucky Olguin MD;  Location: [facility];  Service: General;  Laterality: N/A;  *     Social History     Socioeconomic History    Marital status:    Tobacco Use    Smoking status: Former     Types: Cigarettes    Smokeless tobacco: Never    Tobacco comments:     Quit over 25 years ago.    Substance and Sexual Activity    Alcohol use: Never    Drug use: Never    Sexual activity: Not Currently     Social Drivers of Health     Financial Resource Strain: High Risk (9/13/2023)    Overall Financial Resource Strain (CARDIA)     Difficulty of Paying Living Expenses: Hard   Food Insecurity: Food Insecurity Present (6/25/2024)    Hunger Vital Sign     Worried About Running Out of Food in the Last Year: Never true     Ran Out of Food in the Last Year: Sometimes true   Transportation Needs: No Transportation Needs (6/25/2024)    TRANSPORTATION NEEDS      Transportation : No   Physical Activity: Inactive (9/13/2023)    Exercise Vital Sign     Days of Exercise per Week: 0 days     Minutes of Exercise per Session: 0 min   Stress: No Stress Concern Present (9/13/2023)    [employer] [employer] - Occupational Stress Questionnaire     Feeling of Stress : Only a little   Housing Stability: Low Risk  (9/13/2023)    Housing Stability Vital Sign     Unable to Pay for Housing in the Last Year: No     Number of Places Lived in the Last Year: 1     Unstable Housing in the Last Year: No      Family History   Problem Relation Name Age of Onset    Leukemia Father      Prostate cancer Brother      Breast cancer Other niece 48    Breast cancer Maternal Aunt      Breast cancer Maternal Aunt      Breast cancer Maternal Aunt      Cancer Paternal Uncle        Review of patient's allergies indicates:  No Known Allergies   Review of Systems   Constitutional:  Negative for chills, diaphoresis, fatigue, fever and unexpected weight change.   HENT:  Negative for nasal congestion, mouth sores, sinus pressure/congestion and sore throat.    Eyes:  Negative for pain and visual disturbance.   Respiratory:  Negative for cough, chest tightness and shortness of breath.    Cardiovascular:  Negative for chest pain, palpitations and leg swelling.   Gastrointestinal:  Negative for abdominal distention, abdominal pain, blood in stool, constipation and diarrhea.   Genitourinary:  Negative for dysuria, frequency and hematuria.   Musculoskeletal:  Negative for arthralgias and back pain.   Integumentary:  Negative for rash.   Neurological:  Positive for numbness. Negative for dizziness, weakness and headaches.   Hematological:  Negative for adenopathy.   Psychiatric/Behavioral:  Negative for confusion.          Objective:      Physical Exam  Vitals reviewed.   Constitutional:       General: She is not in acute distress.     Appearance: Normal appearance.   HENT:      Head: Normocephalic  and atraumatic.      Nose: Nose normal.      Mouth/Throat:      Mouth: Mucous membranes are moist.   Eyes:      Extraocular Movements: Extraocular movements intact.      Conjunctiva/sclera: Conjunctivae normal.   Cardiovascular:      Rate and Rhythm: Normal rate and regular rhythm.      Pulses: Normal pulses.      Heart sounds: Normal heart sounds.   Pulmonary:      Effort: Pulmonary effort is normal.      Breath sounds: Normal breath sounds.   Musculoskeletal:         General: No swelling. Normal range of motion.      Cervical back: Normal range of motion and neck supple.      Right lower leg: No edema.      Left lower leg: No edema.   Skin:     General: Skin is warm and dry.   Neurological:      General: No focal deficit present.      Mental Status: She is alert and oriented to person, place, and time. Mental status is at baseline.   Psychiatric:         Mood and Affect: Mood normal.         Behavior: Behavior normal.         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  This was staged using CT scan.  Lung nodule        Plan:       Patient has at least stage IIB pancreatic cancer based on CT abdomen/pelvis measurements.     PET/CT scan on 10/05/2023- No FDG avid metastatic disease identified. Focal uptake in the pancreatic head about a CBD stent compatible with patient's primary malignancy.    Patient saw Dr. Chucky Olguin on 10/03/2023, underwent port placement and diagnostic laparoscopy on 10/09/2023.  No metastatic disease seen.  Started modified FOLFIRINOX on 10/10/2023.  Oxaliplatin discontinued with cycle 11.    Cycle 12 given on 03/19/2024; Surgery delayed due to insurance denial; Cycle 13 given on 05/22/2024     Whipple done on 06/24/2024    She has met with Genetics and results were negative.    MRI of right hip done on 08/01/2024 showed no evidence of disease.    Okay to continue Tramadol and Lyrica.    Most  recent CT scans done on 01/08/2025 showed a 7 mm lung nodule in the right middle lobe, after review with Radiology this looked inflammatory. CA 19-9 done on same day now at 11.07    RTC in 3 months with labs and CT scans.    CBC, CMP, CA 19-9    Javed Forman II, MD

## 2025-01-15 ENCOUNTER — OFFICE VISIT (OUTPATIENT)
Dept: HEMATOLOGY/ONCOLOGY | Facility: CLINIC | Age: 85
End: 2025-01-15
Payer: COMMERCIAL

## 2025-01-15 VITALS
TEMPERATURE: 98 F | RESPIRATION RATE: 18 BRPM | HEIGHT: 62 IN | HEART RATE: 64 BPM | WEIGHT: 133 LBS | DIASTOLIC BLOOD PRESSURE: 87 MMHG | SYSTOLIC BLOOD PRESSURE: 136 MMHG | BODY MASS INDEX: 24.48 KG/M2 | OXYGEN SATURATION: 99 %

## 2025-01-15 DIAGNOSIS — C25.9 PANCREATIC ADENOCARCINOMA: Primary | ICD-10-CM

## 2025-01-15 DIAGNOSIS — C77.2 MALIGNANT NEOPLASM METASTATIC TO INTRA-ABDOMINAL LYMPH NODE: ICD-10-CM

## 2025-01-15 PROCEDURE — 3075F SYST BP GE 130 - 139MM HG: CPT | Mod: CPTII,S$GLB,, | Performed by: INTERNAL MEDICINE

## 2025-01-15 PROCEDURE — 1159F MED LIST DOCD IN RCRD: CPT | Mod: CPTII,S$GLB,, | Performed by: INTERNAL MEDICINE

## 2025-01-15 PROCEDURE — 1160F RVW MEDS BY RX/DR IN RCRD: CPT | Mod: CPTII,S$GLB,, | Performed by: INTERNAL MEDICINE

## 2025-01-15 PROCEDURE — 99999 PR PBB SHADOW E&M-EST. PATIENT-LVL IV: CPT | Mod: PBBFAC,,, | Performed by: INTERNAL MEDICINE

## 2025-01-15 PROCEDURE — 3288F FALL RISK ASSESSMENT DOCD: CPT | Mod: CPTII,S$GLB,, | Performed by: INTERNAL MEDICINE

## 2025-01-15 PROCEDURE — 99214 OFFICE O/P EST MOD 30 MIN: CPT | Mod: S$GLB,,, | Performed by: INTERNAL MEDICINE

## 2025-01-15 PROCEDURE — 1101F PT FALLS ASSESS-DOCD LE1/YR: CPT | Mod: CPTII,S$GLB,, | Performed by: INTERNAL MEDICINE

## 2025-01-15 PROCEDURE — 3079F DIAST BP 80-89 MM HG: CPT | Mod: CPTII,S$GLB,, | Performed by: INTERNAL MEDICINE

## 2025-01-15 RX ORDER — IBUPROFEN 400 MG/1
400 TABLET ORAL EVERY 6 HOURS PRN
COMMUNITY

## 2025-02-26 ENCOUNTER — OFFICE VISIT (OUTPATIENT)
Dept: FAMILY MEDICINE | Facility: CLINIC | Age: 85
End: 2025-02-26
Payer: COMMERCIAL

## 2025-02-26 VITALS
TEMPERATURE: 98 F | HEART RATE: 77 BPM | SYSTOLIC BLOOD PRESSURE: 126 MMHG | BODY MASS INDEX: 24.59 KG/M2 | DIASTOLIC BLOOD PRESSURE: 75 MMHG | OXYGEN SATURATION: 99 % | WEIGHT: 133.63 LBS | HEIGHT: 62 IN

## 2025-02-26 DIAGNOSIS — I10 PRIMARY HYPERTENSION: Primary | ICD-10-CM

## 2025-02-26 DIAGNOSIS — R73.02 IMPAIRED GLUCOSE TOLERANCE: ICD-10-CM

## 2025-02-26 PROCEDURE — 99213 OFFICE O/P EST LOW 20 MIN: CPT | Mod: PBBFAC

## 2025-02-26 RX ORDER — ASPIRIN 81 MG/1
81 TABLET ORAL DAILY
Qty: 90 TABLET | Refills: 4 | Status: SHIPPED | OUTPATIENT
Start: 2025-02-26 | End: 2026-02-26

## 2025-02-26 RX ORDER — METFORMIN HYDROCHLORIDE 500 MG/1
500 TABLET ORAL DAILY
Qty: 90 TABLET | Refills: 1 | Status: SHIPPED | OUTPATIENT
Start: 2025-02-26

## 2025-02-26 RX ORDER — LISINOPRIL AND HYDROCHLOROTHIAZIDE 10; 12.5 MG/1; MG/1
1 TABLET ORAL DAILY
Qty: 90 TABLET | Refills: 1 | Status: SHIPPED | OUTPATIENT
Start: 2025-02-26

## 2025-02-26 NOTE — PROGRESS NOTES
"[facility]    Subjective:      Chief Complaint: Hypertension and PRE-DM      HPI   Darby Gary is a 84 y.o. female who presents to [facility] for HTN and pre-DM.    Patient also follows with [facility].    C/o  No issues today. Has some chronic neuropathy of lower extremities, takes pregabalin intermittently. Will begin taking more regularly for better relief. Neuropathy does not affect ADLs.     Pancreatic adenocarcinoma  - Diagnosed 9/11/23  - s/p chemo/radiation and Whipple procedure 6/24/24 by Dr. Pérez  - Creon 36,000 qd  - 7 mm right lung nodule found on chest CT (1/8/25), following with Dr. Forman, repeat CT already ordered  - Follows with Oncology, Dr. Forman     Pre-DM  - Takes metformin 500 mg qd  - Last A1c 6.3 (8/15/24)  - Denies polydipsia, polyuria, hypoglycemia symptoms, GI side effects     HTN  - Compliant with lisinopril-HCTZ 10-12.5 and 20 mEq K daily  - BP wnl today  - Denies HA, CP, palpitations, LE edema     Problem list  - Pancreatic adenocarcinoma s/p chemo/radiation and whipple procedure  - HTN  - Pre-DM  - HLD  - Vit D def    Review of Systems  As per HPI.    Objective:     /75 (BP Location: Left arm, Patient Position: Sitting)   Pulse 77   Temp 97.9 °F (36.6 °C) (Oral)   Ht 5' 2" (1.575 m)   Wt 60.6 kg (133 lb 9.6 oz)   SpO2 99%   BMI 24.44 kg/m²     Physical Exam:  Gen: No acute distress, pleasant female  CV: RRR, no murmurs. No LE edema.  Resp: CTAB, breathing non labored on room air.  MSK: ambulates without assistance    Current Outpatient Medications   Medication Instructions    aspirin (ECOTRIN) 81 mg, Oral, Daily    diclofenac sodium (VOLTAREN) 2 g, Topical (Top), Daily PRN    ergocalciferol (ERGOCALCIFEROL) 50,000 Units, Every 7 days    gabapentin (NEURONTIN) 100 mg    ibuprofen (ADVIL,MOTRIN) 400 mg, Oral, Every 6 hours PRN    lipase-protease-amylase (CREON) 36,000-114,000- 180,000 unit CpDR Take one capsule po tid with meals    " lisinopriL-hydrochlorothiazide (PRINZIDE,ZESTORETIC) 10-12.5 mg per tablet 1 tablet, Oral, Daily    metFORMIN (GLUCOPHAGE) 500 mg, Oral, Daily    potassium chloride (KLOR-CON) 10 MEQ TbSR 10 mEq, Oral, 2 times daily    potassium chloride SA (K-DUR,KLOR-CON M) 10 MEQ tablet 10 mEq, Oral, 2 times daily    pregabalin (LYRICA) 25 MG capsule 1 capsule, 3 times daily     Assessment/Plan:     HTN  - DASH diet and exercise as tolerated  - Continue lisinopril-HCTZ 10-12.5 mg and ASA 81 mg     Impaired glucose tolerance  - Continue metformin 500 mg qd    Health Maintenance  - Pap Smear: negative in 2023 per patient  - Mammogram: no suspicious masses, scattered fibroglandular densities (10/14/24)  - Colon Cancer: Cologuard negative 6/13/23  - DEXA: 10/14/2024 no osteopenia or osteoporosis  - Vaccinations: informed refusal     Follow-up: 6 months for HTN, pre-DM    Ramy Griggs MD   [facility] - PGY-III

## 2025-02-28 NOTE — PROGRESS NOTES
I reviewed History, PE, A/P and medical record.  Services provided in outpatient department of a teaching hospital/facility, I was immediately available.  I agree with resident. Care provided was reasonable and necessary.   I evaluated the patient with resident at time of visit.  Doing well. Following lung nodule. Discussed OTC topicals for neuropathy

## 2025-03-11 ENCOUNTER — OFFICE VISIT (OUTPATIENT)
Dept: SURGICAL ONCOLOGY | Facility: CLINIC | Age: 85
End: 2025-03-11
Payer: COMMERCIAL

## 2025-03-11 VITALS
HEART RATE: 73 BPM | WEIGHT: 134 LBS | HEIGHT: 62 IN | SYSTOLIC BLOOD PRESSURE: 120 MMHG | DIASTOLIC BLOOD PRESSURE: 75 MMHG | BODY MASS INDEX: 24.66 KG/M2 | OXYGEN SATURATION: 98 %

## 2025-03-11 DIAGNOSIS — C25.9 MALIGNANT NEOPLASM OF PANCREAS, UNSPECIFIED LOCATION OF MALIGNANCY: Primary | ICD-10-CM

## 2025-03-11 PROCEDURE — 1160F RVW MEDS BY RX/DR IN RCRD: CPT | Mod: CPTII,S$GLB,, | Performed by: NURSE PRACTITIONER

## 2025-03-11 PROCEDURE — 1126F AMNT PAIN NOTED NONE PRSNT: CPT | Mod: CPTII,S$GLB,, | Performed by: NURSE PRACTITIONER

## 2025-03-11 PROCEDURE — 1159F MED LIST DOCD IN RCRD: CPT | Mod: CPTII,S$GLB,, | Performed by: NURSE PRACTITIONER

## 2025-03-11 PROCEDURE — 1101F PT FALLS ASSESS-DOCD LE1/YR: CPT | Mod: CPTII,S$GLB,, | Performed by: NURSE PRACTITIONER

## 2025-03-11 PROCEDURE — 99213 OFFICE O/P EST LOW 20 MIN: CPT | Mod: S$GLB,,, | Performed by: NURSE PRACTITIONER

## 2025-03-11 PROCEDURE — 99999 PR PBB SHADOW E&M-EST. PATIENT-LVL III: CPT | Mod: PBBFAC,,, | Performed by: NURSE PRACTITIONER

## 2025-03-11 PROCEDURE — 3288F FALL RISK ASSESSMENT DOCD: CPT | Mod: CPTII,S$GLB,, | Performed by: NURSE PRACTITIONER

## 2025-03-11 PROCEDURE — 3074F SYST BP LT 130 MM HG: CPT | Mod: CPTII,S$GLB,, | Performed by: NURSE PRACTITIONER

## 2025-03-11 PROCEDURE — 3078F DIAST BP <80 MM HG: CPT | Mod: CPTII,S$GLB,, | Performed by: NURSE PRACTITIONER

## 2025-03-11 RX ORDER — ERGOCALCIFEROL 1.25 MG/1
50000 CAPSULE ORAL
COMMUNITY
Start: 2025-03-10

## 2025-03-11 NOTE — PROGRESS NOTES
"  [facility]    Patient ID: [id number]     Chief Complaint: Follow-up (Pt presents 6 month follow up visit/Reports concerns after a fall she had from 2023)      HPI:     Darby Gary is a 84 y.o. female here today for follow up  status post L/R whipple 6/2024. She is tolerating oral diet at what she feels is her baseline intake. Denies issues with endurance or ADLs. She is current with Dr Forman with pending appointment April 17, 2025. Abdominal exam benign.     Current Outpatient Medications   Medication Instructions    aspirin (ECOTRIN) 81 mg, Oral, Daily    diclofenac sodium (VOLTAREN) 2 g, Topical (Top), Daily PRN    ergocalciferol (ERGOCALCIFEROL) 50,000 Units, Every 7 days    ibuprofen (ADVIL,MOTRIN) 400 mg, Every 6 hours PRN    lipase-protease-amylase (CREON) 36,000-114,000- 180,000 unit CpDR Take one capsule po tid with meals    lisinopriL-hydrochlorothiazide (PRINZIDE,ZESTORETIC) 10-12.5 mg per tablet 1 tablet, Oral, Daily    metFORMIN (GLUCOPHAGE) 500 mg, Oral, Daily    potassium chloride SA (K-DUR,KLOR-CON M) 10 MEQ tablet 10 mEq, Oral, 2 times daily    pregabalin (LYRICA) 25 MG capsule 1 capsule, 3 times daily       Review of patient's allergies indicates:  No Known Allergies     Patient Care Team:  Ramy Griggs MD as PCP - General (Family Medicine)     Subjective:     Review of Systems   Constitutional:  Negative for chills and fever.   Respiratory:  Negative for chest tightness and shortness of breath.    Cardiovascular:  Negative for chest pain.   Gastrointestinal:  Negative for nausea and vomiting.       12 point review of systems conducted, negative except as stated in the history of present illness. See HPI for details.    Objective:     Visit Vitals  /75 (BP Location: Left arm, Patient Position: Sitting)   Pulse 73   Ht 5' 2" (1.575 m)   Wt 60.8 kg (134 lb)   SpO2 98%   BMI 24.51 kg/m²       Physical Exam  Constitutional:       General: She is not in acute distress.  HENT: "      Head: Atraumatic.      Mouth/Throat:      Mouth: Mucous membranes are moist.   Eyes:      Extraocular Movements: Extraocular movements intact.      Conjunctiva/sclera: Conjunctivae normal.   Cardiovascular:      Rate and Rhythm: Normal rate and regular rhythm.   Pulmonary:      Effort: Pulmonary effort is normal. No respiratory distress.      Breath sounds: Normal breath sounds.   Abdominal:      General: There is no distension.      Palpations: Abdomen is soft.   Musculoskeletal:         General: No swelling.      Cervical back: Normal range of motion and neck supple.   Skin:     General: Skin is warm and dry.   Neurological:      General: No focal deficit present.      Mental Status: She is alert and oriented to person, place, and time.         Assessment/Plan:      Pancreatic adenocarcinoma status post laparoscopic/robotic whipple June 2024 after neoadjuvant therapy.     Patient is doing well without acute issue. She is pending appointment with Dr Forman 4/17/25.   Mediport remains in place. We will remove for her when requested by Dr Forman.      RTC in 6 months     Amy Wood NP

## 2025-04-10 ENCOUNTER — HOSPITAL ENCOUNTER (OUTPATIENT)
Dept: RADIOLOGY | Facility: HOSPITAL | Age: 85
Discharge: HOME | End: 2025-04-10
Attending: INTERNAL MEDICINE
Payer: COMMERCIAL

## 2025-04-10 DIAGNOSIS — C25.9 PANCREATIC ADENOCARCINOMA: ICD-10-CM

## 2025-04-10 DIAGNOSIS — C77.2 MALIGNANT NEOPLASM METASTATIC TO INTRA-ABDOMINAL LYMPH NODE: ICD-10-CM

## 2025-04-10 PROCEDURE — 25500020 PHARM REV CODE 255: Performed by: INTERNAL MEDICINE

## 2025-04-10 PROCEDURE — 71260 CT THORAX DX C+: CPT | Mod: TC

## 2025-04-10 RX ORDER — DIATRIZOATE MEGLUMINE AND DIATRIZOATE SODIUM 660; 100 MG/ML; MG/ML
30 SOLUTION ORAL; RECTAL
Status: COMPLETED | OUTPATIENT
Start: 2025-04-10 | End: 2025-04-10

## 2025-04-10 RX ADMIN — IOHEXOL 75 ML: 350 INJECTION, SOLUTION INTRAVENOUS at 11:04

## 2025-04-10 RX ADMIN — DIATRIZOATE MEGLUMINE AND DIATRIZOATE SODIUM 30 ML: 660; 100 LIQUID ORAL; RECTAL at 10:04

## 2025-04-16 DIAGNOSIS — C25.9 PANCREATIC ADENOCARCINOMA: ICD-10-CM

## 2025-04-16 RX ORDER — POTASSIUM CHLORIDE 750 MG/1
10 TABLET, EXTENDED RELEASE ORAL 2 TIMES DAILY
Qty: 60 TABLET | Refills: 1 | Status: SHIPPED | OUTPATIENT
Start: 2025-04-16

## 2025-04-17 ENCOUNTER — OFFICE VISIT (OUTPATIENT)
Dept: HEMATOLOGY/ONCOLOGY | Facility: CLINIC | Age: 85
End: 2025-04-17
Payer: COMMERCIAL

## 2025-04-17 VITALS
HEIGHT: 62 IN | OXYGEN SATURATION: 99 % | SYSTOLIC BLOOD PRESSURE: 133 MMHG | WEIGHT: 138.88 LBS | HEART RATE: 77 BPM | BODY MASS INDEX: 25.55 KG/M2 | DIASTOLIC BLOOD PRESSURE: 76 MMHG | RESPIRATION RATE: 18 BRPM

## 2025-04-17 DIAGNOSIS — Z85.07 HISTORY OF PANCREATIC CANCER: Primary | ICD-10-CM

## 2025-04-17 DIAGNOSIS — C25.9 PANCREATIC ADENOCARCINOMA: ICD-10-CM

## 2025-04-17 DIAGNOSIS — C77.2 MALIGNANT NEOPLASM METASTATIC TO INTRA-ABDOMINAL LYMPH NODE: ICD-10-CM

## 2025-04-17 PROCEDURE — 99999 PR PBB SHADOW E&M-EST. PATIENT-LVL III: CPT | Mod: PBBFAC,,, | Performed by: INTERNAL MEDICINE

## 2025-04-17 NOTE — PROGRESS NOTES
Subjective:       Patient ID: Darby Gary is a 84 y.o. female.    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  This was staged using CT scan.  Patient had surgery on 06/24/2024, pathology revealed well-differentiated pancreatic ductal adenocarcinoma measuring 2.6 cm with clear margins, there were 24 lymph nodes removed all negative for malignancy, final pathologic stage was yp T2, N0    Current Treatment:   Observation    Treatment History:  ERCP on 09/13/2023  Modified FOLFIRINOX without the 5FU bolus.  This treatment was started on 10/10/2023  Whipple done on 06/24/2024    HPI:  84 y.o. female was seen by her nephrologist, blood work revealed elevated liver enzymes and bilirubin, she was instructed to present to the emergency department on 09/11/2023.  She presented to [facility] on 09/11/2023.  CMP in the emergency department revealed an ALT of 233, AST of 263, alkaline phosphatase of 1113, and a bilirubin of 2.3.  Hepatitis panel was done and was negative, lipase was also within normal limits.  A CEA was normal at 1.96, CA 19 9 was extremely elevated at 2461.89.  The patient underwent a CT scan of the abdomen and pelvis with contrast on 09/11/2023 that revealed suspected obstructive pancreatic head mass measuring 2.2 cm with moderate dilation of the biliary ductal system and severe dilation of the pancreatic duct.  There was 1-2 possible regional enlarged lymph nodes in the periportal region.  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  A biliary sphincterotomy was performed and 1 uncovered metal biliary stent was placed.  Brushings were obtained, cytology revealed adenocarcinoma.  She was discharged home in stable condition on 09/14/2023.  The  patient was referred to Medical Oncology.  I saw the patient on 09/25/2023.  She stated that she was feeling okay.  She had no major issues to discuss.  She was seen by Dr. Chucky Olguin on 10/03/2023, underwent diagnostic laparoscopy and port placement on 10/10/2023.  No metastatic disease seen on diagnostic laparoscopy.  Started modified FOLFIRINOX on 10/10/2023.    Cycle 12 given on 03/19/2024; Surgery delayed due to insurance denial; Cycle 13 given on 05/22/2024     CT scan of the chest, abdomen, and pelvis done on 06/12/2024 showed a mass lesion at the head of the pancreas that had decreased in size with no CT evidence of metastatic disease.    Whipple done on 06/24/2024, pathology revealed well-differentiated pancreatic ductal adenocarcinoma measuring 2.6 cm with clear margins, there were 24 lymph nodes removed all negative for malignancy, final pathologic stage was yp T2, N0.    CT scans done on 10/11/2024 showed postoperative changes following whipple procedure with no evidence of recurrent or metastatic disease in the chest abdomen and pelvis.  CA 19-9 done on same day now at 10.80.    Most recent CT scans done on 04/10/2025 showed resolution of right middle lobe pulmonary nodule with no evidence of disease.    Interval History:   Patient is here today for follow up visit.  Overall, she was doing well.  She continues to eat well.  She states that she feels fantastic.  She was nervous about her scan results, but after getting them feels much better.  She states that she is always on the go.      Past Medical History:   Diagnosis Date    Hyperlipidemia     Hypertension     Kidney cysts       Past Surgical History:   Procedure Laterality Date    CATARACT EXTRACTION Bilateral     COLONOSCOPY      DIAGNOSTIC LAPAROSCOPY N/A 10/09/2023    Procedure: LAPAROSCOPY, DIAGNOSTIC  Req 12/12:30;  Surgeon: Chucky Olguin MD;  Location: [facility];  Service: General;  Laterality: N/A;    ERCP N/A 09/13/2023    Procedure:  ERCP (ENDOSCOPIC RETROGRADE CHOLANGIOPANCREATOGRAPHY)/ cytology brushing;  Surgeon: Erika Ulloa MD;  Location: [facility] ENDOSCOPY;  Service: Gastroenterology;  Laterality: N/A;    ERCP W/ SPHICTEROTOMY  2023    Procedure: ERCP, WITH SPHINCTEROTOMY;  Surgeon: Erika Ulloa MD;  Location: [facility] ENDOSCOPY;  Service: Gastroenterology;;    ERCP, WITH STENT INSERTION  2023    Procedure: ERCP, WITH STENT INSERTION;  Surgeon: Erika Ulloa MD;  Location: [facility] ENDOSCOPY;  Service: Gastroenterology;;    ESOPHAGOGASTRODUODENOSCOPY N/A 2023    Procedure: EGD (ESOPHAGOGASTRODUODENOSCOPY);  Surgeon: Erika Ulloa MD;  Location: [facility] ENDOSCOPY;  Service: Gastroenterology;  Laterality: N/A;    PLACEMENT, MEDIPORT Right 10/09/2023    Procedure: PLACEMENT, MEDIPORT;  Surgeon: Chucky Olguin MD;  Location: [facility] OR;  Service: General;  Laterality: Right;    POLYPECTOMY      uterus and colon    ROBOT-ASSISTED LAPAROSCOPIC WHIPPLE PROCEDURE USING DA DAVID XI N/A 2024    Procedure: XI ROBOTIC WHIPPLE PROCEDURE;  Surgeon: Chucky Olguin MD;  Location: [facility];  Service: General;  Laterality: N/A;  *     Social History     Socioeconomic History    Marital status:    Tobacco Use    Smoking status: Former     Current packs/day: 0.00     Types: Cigarettes     Quit date: 1995     Years since quittin.1    Smokeless tobacco: Never    Tobacco comments:     Quit over 25 years ago.    Substance and Sexual Activity    Alcohol use: Never    Drug use: Never    Sexual activity: Not Currently     Social Drivers of Health     Financial Resource Strain: High Risk (2023)    Overall Financial Resource Strain (CARDIA)     Difficulty of Paying Living Expenses: Hard   Food Insecurity: Food Insecurity Present (2024)    Hunger Vital Sign     Worried About Running Out of Food in the Last Year: Never true     Ran Out of Food in the Last Year: Sometimes true   Transportation Needs: No  Transportation Needs (6/25/2024)    TRANSPORTATION NEEDS     Transportation : No   Physical Activity: Inactive (9/13/2023)    Exercise Vital Sign     Days of Exercise per Week: 0 days     Minutes of Exercise per Session: 0 min   Stress: No Stress Concern Present (9/13/2023)    [employer] - Occupational Stress Questionnaire     Feeling of Stress : Only a little   Housing Stability: Low Risk  (9/13/2023)    Housing Stability Vital Sign     Unable to Pay for Housing in the Last Year: No     Number of Places Lived in the Last Year: 1     Unstable Housing in the Last Year: No      Family History   Problem Relation Name Age of Onset    Leukemia Father      Prostate cancer Brother      Breast cancer Other niece 48    Breast cancer Maternal Aunt      Breast cancer Maternal Aunt      Breast cancer Maternal Aunt      Cancer Paternal Uncle        Review of patient's allergies indicates:  No Known Allergies   Review of Systems   Constitutional:  Negative for chills, diaphoresis, fatigue, fever and unexpected weight change.   HENT:  Negative for nasal congestion, mouth sores, sinus pressure/congestion and sore throat.    Eyes:  Negative for pain and visual disturbance.   Respiratory:  Negative for cough, chest tightness and shortness of breath.    Cardiovascular:  Negative for chest pain, palpitations and leg swelling.   Gastrointestinal:  Negative for abdominal distention, abdominal pain, blood in stool, constipation and diarrhea.   Genitourinary:  Negative for dysuria, frequency and hematuria.   Musculoskeletal:  Negative for arthralgias and back pain.   Integumentary:  Negative for rash.   Neurological:  Positive for numbness. Negative for dizziness, weakness and headaches.   Hematological:  Negative for adenopathy.   Psychiatric/Behavioral:  Negative for confusion.          Objective:      Physical Exam  Vitals reviewed.   Constitutional:       General: She is not in acute distress.      Appearance: Normal appearance.   HENT:      Head: Normocephalic and atraumatic.      Nose: Nose normal.      Mouth/Throat:      Mouth: Mucous membranes are moist.   Eyes:      Extraocular Movements: Extraocular movements intact.      Conjunctiva/sclera: Conjunctivae normal.   Cardiovascular:      Rate and Rhythm: Normal rate and regular rhythm.      Pulses: Normal pulses.      Heart sounds: Normal heart sounds.   Pulmonary:      Effort: Pulmonary effort is normal.      Breath sounds: Normal breath sounds.   Musculoskeletal:         General: No swelling. Normal range of motion.      Cervical back: Normal range of motion and neck supple.      Right lower leg: No edema.      Left lower leg: No edema.   Skin:     General: Skin is warm and dry.   Neurological:      General: No focal deficit present.      Mental Status: She is alert and oriented to person, place, and time. Mental status is at baseline.   Psychiatric:         Mood and Affect: Mood normal.         Behavior: Behavior normal.         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  This was staged using CT scan.  Lung nodule        Plan:       Patient has at least stage IIB pancreatic cancer based on CT abdomen/pelvis measurements.     PET/CT scan on 10/05/2023- No FDG avid metastatic disease identified. Focal uptake in the pancreatic head about a CBD stent compatible with patient's primary malignancy.    Patient saw Dr. Chucky Olguin on 10/03/2023, underwent port placement and diagnostic laparoscopy on 10/09/2023.  No metastatic disease seen.  Started modified FOLFIRINOX on 10/10/2023.  Oxaliplatin discontinued with cycle 11.    Cycle 12 given on 03/19/2024; Surgery delayed due to insurance denial; Cycle 13 given on 05/22/2024     Whipple done on 06/24/2024    She has met with Genetics and results were negative.    MRI of right hip done on 08/01/2024 showed no  evidence of disease.    Okay to continue Tramadol and Lyrica.    Most recent CT scans done on 04/10/2025 showed no evidence of disease with resolution of previously seen benign pulmonary nodule.    RTC in 3 months with labs and CT scans.    CBC, CMP, CA 19-9    Visit today included increased complexity associated with the care of the episodic problem pancreatic cancer, addressing and managing the longitudinal care of the patient's pancreatic cancer.       Javed Forman II, MD

## 2025-06-23 ENCOUNTER — OFFICE VISIT (OUTPATIENT)
Dept: URGENT CARE | Facility: CLINIC | Age: 85
End: 2025-06-23
Payer: COMMERCIAL

## 2025-06-23 VITALS
RESPIRATION RATE: 16 BRPM | TEMPERATURE: 98 F | BODY MASS INDEX: 26.31 KG/M2 | OXYGEN SATURATION: 98 % | WEIGHT: 143 LBS | HEIGHT: 62 IN | HEART RATE: 76 BPM | SYSTOLIC BLOOD PRESSURE: 130 MMHG | DIASTOLIC BLOOD PRESSURE: 74 MMHG

## 2025-06-23 DIAGNOSIS — C25.9 PANCREATIC ADENOCARCINOMA: ICD-10-CM

## 2025-06-23 DIAGNOSIS — R21 RASH: ICD-10-CM

## 2025-06-23 DIAGNOSIS — L08.9 SKIN INFECTION: Primary | ICD-10-CM

## 2025-06-23 PROCEDURE — 99215 OFFICE O/P EST HI 40 MIN: CPT | Mod: PBBFAC

## 2025-06-23 PROCEDURE — 99213 OFFICE O/P EST LOW 20 MIN: CPT | Mod: S$PBB,,,

## 2025-06-23 RX ORDER — POTASSIUM CHLORIDE 750 MG/1
10 TABLET, EXTENDED RELEASE ORAL 2 TIMES DAILY
Qty: 60 TABLET | Refills: 1 | Status: SHIPPED | OUTPATIENT
Start: 2025-06-23

## 2025-06-23 RX ORDER — CLINDAMYCIN HYDROCHLORIDE 300 MG/1
300 CAPSULE ORAL 3 TIMES DAILY
Qty: 30 CAPSULE | Refills: 0 | Status: SHIPPED | OUTPATIENT
Start: 2025-06-23 | End: 2025-07-03

## 2025-06-23 RX ORDER — MUPIROCIN 20 MG/G
OINTMENT TOPICAL 3 TIMES DAILY
Qty: 30 G | Refills: 0 | Status: SHIPPED | OUTPATIENT
Start: 2025-06-23 | End: 2025-06-30

## 2025-06-23 NOTE — PROGRESS NOTES
"Subjective:       Patient ID: Darby Gary is a 85 y.o. female.    Vitals:  height is 5' 2" (1.575 m) and weight is 64.9 kg (143 lb). Her temperature is 97.5 °F (36.4 °C). Her blood pressure is 130/74 and her pulse is 76. Her respiration is 16 and oxygen saturation is 98%.     Chief Complaint: Wound Check (States "sore on nose" x 2-3 weeks. Bilat nostrils.)    85-year-old female reports to the clinic for a wound check.  Patient states she has sores on her nose and inside her bilateral nostrils that began about 2 or 3 weeks ago and has slightly worsened.  Patient states that she has had this problem in the past and has been given diagnoses of cellulitis and shingles in the past.  Patient states that she has been washing the area with antibacterial soap twice a day and applying triple antibiotic ointment with little relief.    All other systems are negative    Chart reviewed    Objective:   Physical Exam   Constitutional: She appears well-developed.  Non-toxic appearance. She does not appear ill. No distress.   HENT:   Head: Normocephalic and atraumatic.   Ears:   Right Ear: Hearing, tympanic membrane, external ear and ear canal normal.   Left Ear: Hearing, tympanic membrane, external ear and ear canal normal.   Nose: No purulent discharge. Right sinus exhibits no maxillary sinus tenderness and no frontal sinus tenderness. Left sinus exhibits no maxillary sinus tenderness and no frontal sinus tenderness.       Mouth/Throat: Uvula is midline, oropharynx is clear and moist and mucous membranes are normal.   10 mm by 5mm excoriation present to patients nose, no drainage or discharge present, no active bleeding,       Comments: 10 mm by 5mm excoriation present to patients nose, no drainage or discharge present, no active bleeding,   Eyes: Right eye exhibits no discharge. Left eye exhibits no discharge. Extraocular movement intact   Neck: Neck supple. No neck rigidity present.   Cardiovascular: Regular rhythm. "   Pulmonary/Chest: Effort normal and breath sounds normal. No respiratory distress. She has no wheezes. She has no rhonchi. She has no rales.   Lymphadenopathy:     She has no cervical adenopathy.   Neurological: She is alert.   Skin: Skin is warm, dry and not diaphoretic.   Psychiatric: Her behavior is normal.   Nursing note and vitals reviewed.        Assessment:     1. Skin infection    2. Rash            Plan:   Begin taking clindamycin and complete full course of antibiotics  Begin using mupirocin ointment as directed  Wound Care: Twice daily wound care as discussed. .   Monitor for any signs of infection including worsening redness, swelling, purulent discharge, fever, body aches, or chills and seek follow up care as needed.    Please read all educational materials and discharge instructions carefully  Follow-up with primary care provider as instructed   Discussed signs and symptoms of worsening illness that would warrant further evaluation in the emergency department:  Patient verbalized understanding of these instructions     Skin infection  -     mupirocin (BACTROBAN) 2 % ointment; Apply topically 3 (three) times daily. for 7 days  Dispense: 30 g; Refill: 0  -     clindamycin (CLEOCIN) 300 MG capsule; Take 1 capsule (300 mg total) by mouth 3 (three) times daily. for 10 days  Dispense: 30 capsule; Refill: 0    Rash        Please note: This chart was completed via voice to text dictation. It may contain typographical/word recognition errors. If there are any questions, please contact the provider for final clarification.

## 2025-07-11 ENCOUNTER — HOSPITAL ENCOUNTER (OUTPATIENT)
Dept: RADIOLOGY | Facility: HOSPITAL | Age: 85
Discharge: HOME | End: 2025-07-11
Attending: INTERNAL MEDICINE
Payer: COMMERCIAL

## 2025-07-11 DIAGNOSIS — C25.9 PANCREATIC ADENOCARCINOMA: ICD-10-CM

## 2025-07-11 DIAGNOSIS — C77.2 MALIGNANT NEOPLASM METASTATIC TO INTRA-ABDOMINAL LYMPH NODE: ICD-10-CM

## 2025-07-11 LAB
CREAT SERPL-MCNC: 0.9 MG/DL (ref 0.5–1.4)
SAMPLE: NORMAL

## 2025-07-11 PROCEDURE — 74177 CT ABD & PELVIS W/CONTRAST: CPT | Mod: TC

## 2025-07-11 PROCEDURE — 25500020 PHARM REV CODE 255: Performed by: INTERNAL MEDICINE

## 2025-07-11 RX ORDER — DIATRIZOATE MEGLUMINE AND DIATRIZOATE SODIUM 660; 100 MG/ML; MG/ML
30 SOLUTION ORAL; RECTAL
Status: COMPLETED | OUTPATIENT
Start: 2025-07-11 | End: 2025-07-11

## 2025-07-11 RX ADMIN — IOHEXOL 100 ML: 350 INJECTION, SOLUTION INTRAVENOUS at 11:07

## 2025-07-11 RX ADMIN — DIATRIZOATE MEGLUMINE AND DIATRIZOATE SODIUM 30 ML: 660; 100 LIQUID ORAL; RECTAL at 11:07

## 2025-07-17 NOTE — PROGRESS NOTES
Subjective:       Patient ID: Darby Gary is a 85 y.o. female.    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  This was staged using CT scan.  Patient had surgery on 06/24/2024, pathology revealed well-differentiated pancreatic ductal adenocarcinoma measuring 2.6 cm with clear margins, there were 24 lymph nodes removed all negative for malignancy, final pathologic stage was yp T2, N0    Current Treatment:   Observation    Treatment History:  ERCP on 09/13/2023  Modified FOLFIRINOX without the 5FU bolus.  This treatment was started on 10/10/2023  Whipple done on 06/24/2024    HPI:  85 y.o. female was seen by her nephrologist, blood work revealed elevated liver enzymes and bilirubin, she was instructed to present to the emergency department on 09/11/2023.  She presented to [facility] on 09/11/2023.  CMP in the emergency department revealed an ALT of 233, AST of 263, alkaline phosphatase of 1113, and a bilirubin of 2.3.  Hepatitis panel was done and was negative, lipase was also within normal limits.  A CEA was normal at 1.96, CA 19 9 was extremely elevated at 2461.89.  The patient underwent a CT scan of the abdomen and pelvis with contrast on 09/11/2023 that revealed suspected obstructive pancreatic head mass measuring 2.2 cm with moderate dilation of the biliary ductal system and severe dilation of the pancreatic duct.  There was 1-2 possible regional enlarged lymph nodes in the periportal region.  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  A biliary sphincterotomy was performed and 1 uncovered metal biliary stent was placed.  Brushings were obtained, cytology revealed adenocarcinoma.  She was discharged home in stable condition on 09/14/2023.  The patient was  referred to Medical Oncology.  I saw the patient on 09/25/2023.  She stated that she was feeling okay.  She had no major issues to discuss.  She was seen by Dr. Chucky Olguin on 10/03/2023, underwent diagnostic laparoscopy and port placement on 10/10/2023.  No metastatic disease seen on diagnostic laparoscopy.  Started modified FOLFIRINOX on 10/10/2023.    Cycle 12 given on 03/19/2024; Surgery delayed due to insurance denial; Cycle 13 given on 05/22/2024     CT scan of the chest, abdomen, and pelvis done on 06/12/2024 showed a mass lesion at the head of the pancreas that had decreased in size with no CT evidence of metastatic disease.    Whipple done on 06/24/2024, pathology revealed well-differentiated pancreatic ductal adenocarcinoma measuring 2.6 cm with clear margins, there were 24 lymph nodes removed all negative for malignancy, final pathologic stage was yp T2, N0.    Patient placed on surveillance with CT scans, most recently on 07/11/2025 showing no evidence of disease.    Interval History:   Patient is here today for follow up visit.  Overall, she was doing well.  She has no issues.  She does continue to take Creon.  She has a great appetite.        Past Medical History:   Diagnosis Date    Hyperlipidemia     Hypertension     Kidney cysts       Past Surgical History:   Procedure Laterality Date    CATARACT EXTRACTION Bilateral     COLONOSCOPY      DIAGNOSTIC LAPAROSCOPY N/A 10/09/2023    Procedure: LAPAROSCOPY, DIAGNOSTIC  Req 12/12:30;  Surgeon: Chucky Olguin MD;  Location: [facility] OR;  Service: General;  Laterality: N/A;    ERCP N/A 09/13/2023    Procedure: ERCP (ENDOSCOPIC RETROGRADE CHOLANGIOPANCREATOGRAPHY)/ cytology brushing;  Surgeon: Erika Ulloa MD;  Location: [facility] ENDOSCOPY;  Service: Gastroenterology;  Laterality: N/A;    ERCP W/ SPHICTEROTOMY  09/13/2023    Procedure: ERCP, WITH SPHINCTEROTOMY;  Surgeon: Erika Ulloa MD;  Location: [facility] ENDOSCOPY;  Service: Gastroenterology;;     ERCP, WITH STENT INSERTION  2023    Procedure: ERCP, WITH STENT INSERTION;  Surgeon: Erika Ulloa MD;  Location: [facility] ENDOSCOPY;  Service: Gastroenterology;;    ESOPHAGOGASTRODUODENOSCOPY N/A 2023    Procedure: EGD (ESOPHAGOGASTRODUODENOSCOPY);  Surgeon: Erika Ulloa MD;  Location: [facility] ENDOSCOPY;  Service: Gastroenterology;  Laterality: N/A;    PLACEMENT, MEDIPORT Right 10/09/2023    Procedure: PLACEMENT, MEDIPORT;  Surgeon: Chucky Olguin MD;  Location: [facility] OR;  Service: General;  Laterality: Right;    POLYPECTOMY      uterus and colon    ROBOT-ASSISTED LAPAROSCOPIC WHIPPLE PROCEDURE USING DA DAVID XI N/A 2024    Procedure: XI ROBOTIC WHIPPLE PROCEDURE;  Surgeon: Chucky Olguin MD;  Location: [facility] OR;  Service: General;  Laterality: N/A;  *     Social History     Socioeconomic History    Marital status:    Tobacco Use    Smoking status: Former     Current packs/day: 0.00     Types: Cigarettes     Quit date: 1995     Years since quittin.4    Smokeless tobacco: Never    Tobacco comments:     Quit over 25 years ago.    Substance and Sexual Activity    Alcohol use: Never    Drug use: Never    Sexual activity: Not Currently     Social Drivers of Health     Financial Resource Strain: High Risk (2023)    Overall Financial Resource Strain (CARDIA)     Difficulty of Paying Living Expenses: Hard   Food Insecurity: Unknown (2024)    Hunger Vital Sign     Worried About Running Out of Food in the Last Year: Never true   Recent Concern: Food Insecurity - Food Insecurity Present (2024)    Hunger Vital Sign     Worried About Running Out of Food in the Last Year: Never true     Ran Out of Food in the Last Year: Sometimes true   Transportation Needs: No Transportation Needs (2024)    TRANSPORTATION NEEDS     Transportation : No   Physical Activity: Inactive (2023)    Exercise Vital Sign     Days of Exercise per Week: 0 days     Minutes of Exercise per  Session: 0 min   Stress: No Stress Concern Present (9/13/2023)    [employer] - Occupational Stress Questionnaire     Feeling of Stress : Only a little   Housing Stability: Low Risk  (9/13/2023)    Housing Stability Vital Sign     Unable to Pay for Housing in the Last Year: No     Number of Places Lived in the Last Year: 1     Unstable Housing in the Last Year: No      Family History   Problem Relation Name Age of Onset    Leukemia Father      Prostate cancer Brother      Breast cancer Other niece 48    Breast cancer Maternal Aunt      Breast cancer Maternal Aunt      Breast cancer Maternal Aunt      Cancer Paternal Uncle        Review of patient's allergies indicates:  No Known Allergies   Review of Systems   Constitutional:  Negative for chills, diaphoresis, fatigue, fever and unexpected weight change.   HENT:  Negative for nasal congestion, mouth sores, sinus pressure/congestion and sore throat.    Eyes:  Negative for pain and visual disturbance.   Respiratory:  Negative for cough, chest tightness and shortness of breath.    Cardiovascular:  Negative for chest pain, palpitations and leg swelling.   Gastrointestinal:  Negative for abdominal distention, abdominal pain, blood in stool, constipation and diarrhea.   Genitourinary:  Negative for dysuria, frequency and hematuria.   Musculoskeletal:  Negative for arthralgias and back pain.   Integumentary:  Negative for rash.   Neurological:  Positive for numbness. Negative for dizziness, weakness and headaches.   Hematological:  Negative for adenopathy.   Psychiatric/Behavioral:  Negative for confusion.          Objective:      Physical Exam  Vitals reviewed.   Constitutional:       General: She is not in acute distress.     Appearance: Normal appearance.   HENT:      Head: Normocephalic and atraumatic.      Nose: Nose normal.      Mouth/Throat:      Mouth: Mucous membranes are moist.   Eyes:      Extraocular Movements: Extraocular movements  intact.      Conjunctiva/sclera: Conjunctivae normal.   Cardiovascular:      Rate and Rhythm: Normal rate and regular rhythm.      Pulses: Normal pulses.      Heart sounds: Normal heart sounds.   Pulmonary:      Effort: Pulmonary effort is normal.      Breath sounds: Normal breath sounds.   Musculoskeletal:         General: No swelling. Normal range of motion.      Cervical back: Normal range of motion and neck supple.      Right lower leg: No edema.      Left lower leg: No edema.   Skin:     General: Skin is warm and dry.   Neurological:      General: No focal deficit present.      Mental Status: She is alert and oriented to person, place, and time. Mental status is at baseline.   Psychiatric:         Mood and Affect: Mood normal.         Behavior: Behavior normal.         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  This was staged using CT scan.  Lung nodule        Plan:       Patient has at least stage IIB pancreatic cancer based on CT abdomen/pelvis measurements.     PET/CT scan on 10/05/2023- No FDG avid metastatic disease identified. Focal uptake in the pancreatic head about a CBD stent compatible with patient's primary malignancy.    Patient saw Dr. Chucky Olguin on 10/03/2023, underwent port placement and diagnostic laparoscopy on 10/09/2023.  No metastatic disease seen.  Started modified FOLFIRINOX on 10/10/2023.  Oxaliplatin discontinued with cycle 11.    Cycle 12 given on 03/19/2024; Surgery delayed due to insurance denial; Cycle 13 given on 05/22/2024     Whipple done on 06/24/2024    She has met with Genetics and results were negative.    MRI of right hip done on 08/01/2024 showed no evidence of disease.    Okay to continue Tramadol and Lyrica.    Most recent CT scans done on 07/11/2025 showed no evidence of disease.    RTC in 3 months with labs and CT scans.    CBC, CMP, CA 19-9    Visit today included  increased complexity associated with the care of the episodic problem pancreatic cancer, addressing and managing the longitudinal care of the patient's pancreatic cancer.       Javed Forman II, MD

## 2025-07-18 ENCOUNTER — OFFICE VISIT (OUTPATIENT)
Dept: HEMATOLOGY/ONCOLOGY | Facility: CLINIC | Age: 85
End: 2025-07-18
Payer: COMMERCIAL

## 2025-07-18 VITALS
HEIGHT: 62 IN | WEIGHT: 143 LBS | BODY MASS INDEX: 26.31 KG/M2 | DIASTOLIC BLOOD PRESSURE: 66 MMHG | OXYGEN SATURATION: 98 % | RESPIRATION RATE: 18 BRPM | HEART RATE: 75 BPM | SYSTOLIC BLOOD PRESSURE: 117 MMHG

## 2025-07-18 DIAGNOSIS — C77.2 MALIGNANT NEOPLASM METASTATIC TO INTRA-ABDOMINAL LYMPH NODE: ICD-10-CM

## 2025-07-18 DIAGNOSIS — Z85.07 HISTORY OF PANCREATIC CANCER: Primary | ICD-10-CM

## 2025-07-18 DIAGNOSIS — C25.9 PANCREATIC ADENOCARCINOMA: ICD-10-CM

## 2025-07-18 PROCEDURE — 99999 PR PBB SHADOW E&M-EST. PATIENT-LVL III: CPT | Mod: PBBFAC,,, | Performed by: INTERNAL MEDICINE

## 2025-07-28 DIAGNOSIS — C25.9 PANCREATIC ADENOCARCINOMA: ICD-10-CM

## 2025-07-28 RX ORDER — POTASSIUM CHLORIDE 750 MG/1
10 TABLET, EXTENDED RELEASE ORAL 2 TIMES DAILY
Qty: 180 TABLET | Refills: 0 | Status: SHIPPED | OUTPATIENT
Start: 2025-07-28

## 2025-09-01 ENCOUNTER — HOSPITAL ENCOUNTER (EMERGENCY)
Facility: HOSPITAL | Age: 85
Discharge: HOME | End: 2025-09-01
Attending: FAMILY MEDICINE
Payer: COMMERCIAL

## 2025-09-01 VITALS
OXYGEN SATURATION: 98 % | DIASTOLIC BLOOD PRESSURE: 79 MMHG | SYSTOLIC BLOOD PRESSURE: 168 MMHG | BODY MASS INDEX: 25.65 KG/M2 | RESPIRATION RATE: 18 BRPM | HEIGHT: 62 IN | WEIGHT: 139.38 LBS | HEART RATE: 69 BPM | TEMPERATURE: 98 F

## 2025-09-01 DIAGNOSIS — M54.16 LUMBAR RADICULITIS: ICD-10-CM

## 2025-09-01 DIAGNOSIS — S39.012A LUMBAR STRAIN, INITIAL ENCOUNTER: Primary | ICD-10-CM

## 2025-09-01 PROCEDURE — 99283 EMERGENCY DEPT VISIT LOW MDM: CPT | Mod: 25

## 2025-09-01 RX ORDER — METHYLPREDNISOLONE 4 MG/1
TABLET ORAL
Qty: 1 EACH | Refills: 0 | Status: SHIPPED | OUTPATIENT
Start: 2025-09-01 | End: 2025-09-22

## 2025-09-03 ENCOUNTER — OFFICE VISIT (OUTPATIENT)
Dept: SURGICAL ONCOLOGY | Facility: CLINIC | Age: 85
End: 2025-09-03
Payer: COMMERCIAL

## 2025-09-03 VITALS
BODY MASS INDEX: 26.43 KG/M2 | DIASTOLIC BLOOD PRESSURE: 78 MMHG | HEIGHT: 62 IN | TEMPERATURE: 98 F | HEART RATE: 67 BPM | WEIGHT: 143.63 LBS | SYSTOLIC BLOOD PRESSURE: 158 MMHG | OXYGEN SATURATION: 98 % | RESPIRATION RATE: 18 BRPM

## 2025-09-03 DIAGNOSIS — C25.9 MALIGNANT NEOPLASM OF PANCREAS, UNSPECIFIED LOCATION OF MALIGNANCY: Primary | ICD-10-CM

## 2025-09-03 PROCEDURE — 1160F RVW MEDS BY RX/DR IN RCRD: CPT | Mod: CPTII,S$GLB,, | Performed by: NURSE PRACTITIONER

## 2025-09-03 PROCEDURE — 1101F PT FALLS ASSESS-DOCD LE1/YR: CPT | Mod: CPTII,S$GLB,, | Performed by: NURSE PRACTITIONER

## 2025-09-03 PROCEDURE — 3288F FALL RISK ASSESSMENT DOCD: CPT | Mod: CPTII,S$GLB,, | Performed by: NURSE PRACTITIONER

## 2025-09-03 PROCEDURE — 99213 OFFICE O/P EST LOW 20 MIN: CPT | Mod: S$GLB,,, | Performed by: NURSE PRACTITIONER

## 2025-09-03 PROCEDURE — 3077F SYST BP >= 140 MM HG: CPT | Mod: CPTII,S$GLB,, | Performed by: NURSE PRACTITIONER

## 2025-09-03 PROCEDURE — 99999 PR PBB SHADOW E&M-EST. PATIENT-LVL III: CPT | Mod: PBBFAC,,, | Performed by: NURSE PRACTITIONER

## 2025-09-03 PROCEDURE — 3078F DIAST BP <80 MM HG: CPT | Mod: CPTII,S$GLB,, | Performed by: NURSE PRACTITIONER

## 2025-09-03 PROCEDURE — 1159F MED LIST DOCD IN RCRD: CPT | Mod: CPTII,S$GLB,, | Performed by: NURSE PRACTITIONER

## 2025-09-03 RX ORDER — TRIAMCINOLONE ACETONIDE 0.25 MG/G
CREAM TOPICAL
COMMUNITY

## 2025-09-05 ENCOUNTER — OFFICE VISIT (OUTPATIENT)
Dept: FAMILY MEDICINE | Facility: CLINIC | Age: 85
End: 2025-09-05
Payer: COMMERCIAL

## 2025-09-05 ENCOUNTER — CLINICAL SUPPORT (OUTPATIENT)
Dept: FAMILY MEDICINE | Facility: CLINIC | Age: 85
End: 2025-09-05
Payer: COMMERCIAL

## 2025-09-05 VITALS
TEMPERATURE: 98 F | OXYGEN SATURATION: 98 % | HEART RATE: 69 BPM | RESPIRATION RATE: 18 BRPM | BODY MASS INDEX: 26.5 KG/M2 | SYSTOLIC BLOOD PRESSURE: 158 MMHG | WEIGHT: 144 LBS | HEIGHT: 62 IN | DIASTOLIC BLOOD PRESSURE: 90 MMHG

## 2025-09-05 DIAGNOSIS — I10 PRIMARY HYPERTENSION: Primary | ICD-10-CM

## 2025-09-05 DIAGNOSIS — K87 DISORDERS OF GALLBLADDER, BILIARY TRACT AND PANCREAS IN DISEASES CLASSIFIED ELSEWHERE: ICD-10-CM

## 2025-09-05 DIAGNOSIS — S39.012A STRAIN OF LUMBAR REGION, INITIAL ENCOUNTER: ICD-10-CM

## 2025-09-05 DIAGNOSIS — R73.02 IMPAIRED GLUCOSE TOLERANCE: ICD-10-CM

## 2025-09-05 DIAGNOSIS — E55.9 VITAMIN D DEFICIENCY: ICD-10-CM

## 2025-09-05 LAB
25(OH)D3+25(OH)D2 SERPL-MCNC: 20 NG/ML (ref 30–80)
ALBUMIN/CREAT UR: 9.9 MG/GM CR (ref 0–30)
CHOLEST SERPL-MCNC: 164 MG/DL
CHOLEST/HDLC SERPL: 3 {RATIO} (ref 0–5)
CREAT UR-MCNC: 69.8 MG/DL (ref 45–106)
EST. AVERAGE GLUCOSE BLD GHB EST-MCNC: 125.5 MG/DL
HBA1C MFR BLD: 6 %
HDLC SERPL-MCNC: 63 MG/DL (ref 35–60)
LDLC SERPL CALC-MCNC: 80 MG/DL (ref 50–140)
MICROALBUMIN UR-MCNC: 6.9 UG/ML
TRIGL SERPL-MCNC: 105 MG/DL (ref 37–140)
TSH SERPL-ACNC: 2.68 UIU/ML (ref 0.35–4.94)
VLDLC SERPL CALC-MCNC: 21 MG/DL

## 2025-09-05 PROCEDURE — 80061 LIPID PANEL: CPT

## 2025-09-05 PROCEDURE — 84443 ASSAY THYROID STIM HORMONE: CPT

## 2025-09-05 PROCEDURE — 82306 VITAMIN D 25 HYDROXY: CPT

## 2025-09-05 PROCEDURE — 82043 UR ALBUMIN QUANTITATIVE: CPT

## 2025-09-05 PROCEDURE — 99214 OFFICE O/P EST MOD 30 MIN: CPT | Mod: PBBFAC

## 2025-09-05 PROCEDURE — 83036 HEMOGLOBIN GLYCOSYLATED A1C: CPT

## 2025-09-05 RX ORDER — KETOROLAC TROMETHAMINE 30 MG/ML
30 INJECTION, SOLUTION INTRAMUSCULAR; INTRAVENOUS
Status: COMPLETED | OUTPATIENT
Start: 2025-09-05 | End: 2025-09-05

## 2025-09-05 RX ORDER — METHOCARBAMOL 500 MG/1
500 TABLET, FILM COATED ORAL 2 TIMES DAILY PRN
Qty: 10 TABLET | Refills: 0 | Status: SHIPPED | OUTPATIENT
Start: 2025-09-05

## 2025-09-05 RX ADMIN — KETOROLAC TROMETHAMINE 30 MG: 30 INJECTION, SOLUTION INTRAMUSCULAR; INTRAVENOUS at 08:09

## 2025-09-06 RX ORDER — ERGOCALCIFEROL 1.25 MG/1
50000 CAPSULE ORAL
Qty: 7 CAPSULE | Refills: 0 | Status: SHIPPED | OUTPATIENT
Start: 2025-09-06

## (undated) DEVICE — SUT VICRYL PLUS 3-0 SH 18IN

## (undated) DEVICE — Device

## (undated) DEVICE — DRAPE LEGGINGS CUFF 31X48IN

## (undated) DEVICE — TRAY CATH FOL SIL URIMTR 16FR

## (undated) DEVICE — CLIP HEMO-LOK ML

## (undated) DEVICE — NDL SYR 10ML 18X1.5 LL BLUNT

## (undated) DEVICE — RELOAD SUREFORM 45 3.5 BLU 6R

## (undated) DEVICE — COVER TIP CURVED SCISSORS XI

## (undated) DEVICE — SUT VLOC 90 3-0 V-20 NDL 6

## (undated) DEVICE — SUT 2/0 30IN SILK BLK BRAI

## (undated) DEVICE — COVER PROBE US 5.5X58L NON LTX

## (undated) DEVICE — TROCAR KII FIOS ZTHREAD 11X100

## (undated) DEVICE — PAD PINK TRENDELENBURG POS XL

## (undated) DEVICE — OBTURATOR BLADELESS 8MM XI CLR

## (undated) DEVICE — BAG MEDI-PLAST DECANTER C-FLOW

## (undated) DEVICE — SUT PDS PLUS MONO 1 CT 36IN

## (undated) DEVICE — TROCAR ENDO Z THREAD KII 5X100

## (undated) DEVICE — SPONGE LAP 4X18 PREWASHED

## (undated) DEVICE — STAPLER SUREFORM SGL USE 45

## (undated) DEVICE — DRAIN JACKSON PRATT TRCR 19FR

## (undated) DEVICE — LUBE ELECTRO

## (undated) DEVICE — SUT PROLENE 4-0 SH BLU 36IN

## (undated) DEVICE — BLADE SURG STAINLESS STEEL #11

## (undated) DEVICE — ELECTRODE PATIENT RETURN DISP

## (undated) DEVICE — DRAPE ARM DAVINCI XI

## (undated) DEVICE — SEALER VESSEL EXTEND

## (undated) DEVICE — RELOAD SUREFORM 60 2.5 WHT 6R

## (undated) DEVICE — GLOVE PROTEXIS BLUE LATEX 7

## (undated) DEVICE — SUT VLOC 180 CV-23 NDL

## (undated) DEVICE — SOL IRRI STRL WATER 1000ML

## (undated) DEVICE — ADHESIVE DERMABOND ADVANCED

## (undated) DEVICE — POUCH INSTRUMENT ADH 10X18IN

## (undated) DEVICE — GELPOINT MINI KIT ENDOSCOPIC

## (undated) DEVICE — APPLIER LIGACLIP SM 9.38IN

## (undated) DEVICE — GAUZE SPONGE 4X4 12PLY

## (undated) DEVICE — HOLDER STRIP-T SELF ADH 2X10IN

## (undated) DEVICE — CANNULA REDUCER 12-8MM

## (undated) DEVICE — GLOVE PROTEXIS HYDROGEL SZ7

## (undated) DEVICE — STAPLER SUREFORM 60 SPU

## (undated) DEVICE — IRRIGATOR SUCTION W/TIP

## (undated) DEVICE — SUT PROLENE BLUE 5-0 RB-1 24IN

## (undated) DEVICE — SEAL UNIVERSAL 5MM-8MM XI

## (undated) DEVICE — SUT 1 36IN PDS II VIO MONO

## (undated) DEVICE — SUT MCRYL PLUS 4-0 PS2 27IN

## (undated) DEVICE — DRESSING TRANS 4X4 TEGADERM

## (undated) DEVICE — SUT PDS II 5-0 RB-1RB-1 VI

## (undated) DEVICE — SCISSOR CURVED ENDOPATH 5MM

## (undated) DEVICE — GLOVE PROTEXIS HYDROGEL SZ6.5

## (undated) DEVICE — DRAPE COLUMN DAVINCI XI

## (undated) DEVICE — CANNULA SEAL 12MM

## (undated) DEVICE — NDL HYPO REG 25G X 1 1/2

## (undated) DEVICE — TUBE FEEDING PURPLE 5FRX40CM

## (undated) DEVICE — BRUSH CYTOLOGY RX

## (undated) DEVICE — BAG SPEC RETRV ENDO 4X6IN DISP

## (undated) DEVICE — SUT SILK 3-0 STRANDS 30IN

## (undated) DEVICE — INJECTION RAPID REFILL RX CONT

## (undated) DEVICE — DEVICE LOCKING RX - OLYMPUS

## (undated) DEVICE — SUT SILK 0 SH 30IN BLK BR

## (undated) DEVICE — HEMOSTAT SURGICEL 2X14IN

## (undated) DEVICE — SOL NACL IRR 1000ML BTL

## (undated) DEVICE — COVER C-ARM STRAP BAND 44X80IN

## (undated) DEVICE — SUT VICRYL+ 27 UR-6 VIOL

## (undated) DEVICE — CONTAINER SPECIMEN SCREW 4OZ

## (undated) DEVICE — SOL ELECTROLUBE ANTI-STIC

## (undated) DEVICE — SOL CLEARIFY VISUALIZATION LAP

## (undated) DEVICE — RELOAD SUREFORM 60 3.5 BLU 6R

## (undated) DEVICE — SYR ONLY LUER LOCK 20CC

## (undated) DEVICE — MOUTHPIECE ENDO 60FR

## (undated) DEVICE — SUT VICRYL 3-0 27 SH

## (undated) DEVICE — CANNULA LAP SEAL Z THRD 5X100

## (undated) DEVICE — SET SMARTSITE GRAVITY BLOOD

## (undated) DEVICE — SOL NORMAL USPCA 0.9%

## (undated) DEVICE — KIT SURGICAL TURNOVER

## (undated) DEVICE — BAG INZII TISS RETRV 12/15MM

## (undated) DEVICE — SUT SILK 2-0 STRANDS 30IN

## (undated) DEVICE — SYR SLIP TIP 10ML SHIELD

## (undated) DEVICE — PACK ECLIPSE UNIVERSAL STERILE

## (undated) DEVICE — PORT AIRSEAL 12/120MM LPI

## (undated) DEVICE — NDL 20GX1-1/2IN IB

## (undated) DEVICE — DRAPE STERI LONG

## (undated) DEVICE — COVER MAYO STAND REINFRCD 30

## (undated) DEVICE — RESERVOIR JACKSON-PRATT 100CC

## (undated) DEVICE — DRESSING TELFA N ADH 3X8

## (undated) DEVICE — SET TRI-LUMEN FILTERED TUBE

## (undated) DEVICE — BOWL UTILITY BLUE 32OZ